# Patient Record
Sex: FEMALE | Race: WHITE | Employment: FULL TIME | URBAN - METROPOLITAN AREA
[De-identification: names, ages, dates, MRNs, and addresses within clinical notes are randomized per-mention and may not be internally consistent; named-entity substitution may affect disease eponyms.]

---

## 2017-02-20 RX ORDER — LEVOTHYROXINE SODIUM 150 MCG
TABLET ORAL
Qty: 90 TABLET | Refills: 0 | Status: SHIPPED | OUTPATIENT
Start: 2017-02-20 | End: 2017-05-19 | Stop reason: SDUPTHER

## 2017-03-31 RX ORDER — LABETALOL 300 MG/1
TABLET, FILM COATED ORAL
Qty: 180 TABLET | Refills: 0 | Status: SHIPPED | OUTPATIENT
Start: 2017-03-31 | End: 2017-07-05 | Stop reason: SDUPTHER

## 2017-04-07 ENCOUNTER — OFFICE VISIT (OUTPATIENT)
Dept: FAMILY MEDICINE CLINIC | Age: 42
End: 2017-04-07

## 2017-04-07 VITALS
HEART RATE: 86 BPM | DIASTOLIC BLOOD PRESSURE: 84 MMHG | BODY MASS INDEX: 41.82 KG/M2 | OXYGEN SATURATION: 98 % | SYSTOLIC BLOOD PRESSURE: 120 MMHG | HEIGHT: 65 IN | WEIGHT: 251 LBS

## 2017-04-07 DIAGNOSIS — R60.0 BILATERAL EDEMA OF LOWER EXTREMITY: ICD-10-CM

## 2017-04-07 DIAGNOSIS — E66.01 MORBID OBESITY WITH BMI OF 40.0-44.9, ADULT (HCC): ICD-10-CM

## 2017-04-07 DIAGNOSIS — M17.10 PATELLOFEMORAL ARTHROSIS: ICD-10-CM

## 2017-04-07 DIAGNOSIS — Z00.00 ROUTINE GENERAL MEDICAL EXAMINATION AT A HEALTH CARE FACILITY: Primary | ICD-10-CM

## 2017-04-07 DIAGNOSIS — G89.29 CHRONIC PAIN OF RIGHT KNEE: ICD-10-CM

## 2017-04-07 DIAGNOSIS — M17.10 LOCALIZED OSTEOARTHROSIS, LOWER LEG: ICD-10-CM

## 2017-04-07 DIAGNOSIS — Z23 NEED FOR TDAP VACCINATION: ICD-10-CM

## 2017-04-07 DIAGNOSIS — M25.561 CHRONIC PAIN OF RIGHT KNEE: ICD-10-CM

## 2017-04-07 DIAGNOSIS — I10 ESSENTIAL HYPERTENSION: ICD-10-CM

## 2017-04-07 LAB
A/G RATIO: 1.5 (ref 1.1–2.2)
ALBUMIN SERPL-MCNC: 3.8 G/DL (ref 3.4–5)
ALP BLD-CCNC: 81 U/L (ref 40–129)
ALT SERPL-CCNC: 12 U/L (ref 10–40)
ANION GAP SERPL CALCULATED.3IONS-SCNC: 19 MMOL/L (ref 3–16)
AST SERPL-CCNC: 17 U/L (ref 15–37)
BASOPHILS ABSOLUTE: 0.1 K/UL (ref 0–0.2)
BASOPHILS RELATIVE PERCENT: 0.8 %
BILIRUB SERPL-MCNC: 0.3 MG/DL (ref 0–1)
BUN BLDV-MCNC: 10 MG/DL (ref 7–20)
CALCIUM SERPL-MCNC: 9 MG/DL (ref 8.3–10.6)
CHLORIDE BLD-SCNC: 98 MMOL/L (ref 99–110)
CHOLESTEROL, TOTAL: 227 MG/DL (ref 0–199)
CO2: 24 MMOL/L (ref 21–32)
CREAT SERPL-MCNC: 0.6 MG/DL (ref 0.6–1.1)
EOSINOPHILS ABSOLUTE: 0.2 K/UL (ref 0–0.6)
EOSINOPHILS RELATIVE PERCENT: 3.6 %
GFR AFRICAN AMERICAN: >60
GFR NON-AFRICAN AMERICAN: >60
GLOBULIN: 2.5 G/DL
GLUCOSE BLD-MCNC: 97 MG/DL (ref 70–99)
HCT VFR BLD CALC: 39.1 % (ref 36–48)
HDLC SERPL-MCNC: 64 MG/DL (ref 40–60)
HEMOGLOBIN: 12.7 G/DL (ref 12–16)
LDL CHOLESTEROL CALCULATED: 114 MG/DL
LYMPHOCYTES ABSOLUTE: 1.5 K/UL (ref 1–5.1)
LYMPHOCYTES RELATIVE PERCENT: 22.5 %
MCH RBC QN AUTO: 26.1 PG (ref 26–34)
MCHC RBC AUTO-ENTMCNC: 32.6 G/DL (ref 31–36)
MCV RBC AUTO: 80 FL (ref 80–100)
MONOCYTES ABSOLUTE: 0.3 K/UL (ref 0–1.3)
MONOCYTES RELATIVE PERCENT: 5 %
NEUTROPHILS ABSOLUTE: 4.7 K/UL (ref 1.7–7.7)
NEUTROPHILS RELATIVE PERCENT: 68.1 %
PDW BLD-RTO: 14.7 % (ref 12.4–15.4)
PLATELET # BLD: 295 K/UL (ref 135–450)
PMV BLD AUTO: 7.8 FL (ref 5–10.5)
POTASSIUM SERPL-SCNC: 3.2 MMOL/L (ref 3.5–5.1)
RBC # BLD: 4.89 M/UL (ref 4–5.2)
SODIUM BLD-SCNC: 141 MMOL/L (ref 136–145)
T4 FREE: 1.4 NG/DL (ref 0.9–1.8)
TOTAL PROTEIN: 6.3 G/DL (ref 6.4–8.2)
TRIGL SERPL-MCNC: 243 MG/DL (ref 0–150)
TSH SERPL DL<=0.05 MIU/L-ACNC: 5.86 UIU/ML (ref 0.27–4.2)
VLDLC SERPL CALC-MCNC: 49 MG/DL
WBC # BLD: 6.9 K/UL (ref 4–11)

## 2017-04-07 PROCEDURE — 90471 IMMUNIZATION ADMIN: CPT | Performed by: FAMILY MEDICINE

## 2017-04-07 PROCEDURE — 99396 PREV VISIT EST AGE 40-64: CPT | Performed by: FAMILY MEDICINE

## 2017-04-07 PROCEDURE — 90715 TDAP VACCINE 7 YRS/> IM: CPT | Performed by: FAMILY MEDICINE

## 2017-04-07 RX ORDER — METHYLDOPA 250 MG/1
250 TABLET, FILM COATED ORAL 3 TIMES DAILY
Qty: 90 TABLET | Refills: 3 | Status: SHIPPED | OUTPATIENT
Start: 2017-04-07 | End: 2017-08-01 | Stop reason: SDUPTHER

## 2017-04-10 LAB — HIV-1 AND HIV-2 ANTIBODIES: NORMAL

## 2017-04-11 ENCOUNTER — TELEPHONE (OUTPATIENT)
Dept: OTHER | Facility: CLINIC | Age: 42
End: 2017-04-11

## 2017-05-25 RX ORDER — LEVOTHYROXINE SODIUM 0.15 MG/1
TABLET ORAL
Qty: 90 TABLET | Refills: 0 | Status: SHIPPED | OUTPATIENT
Start: 2017-05-25 | End: 2017-08-22 | Stop reason: SDUPTHER

## 2017-06-16 ENCOUNTER — TELEPHONE (OUTPATIENT)
Dept: FAMILY MEDICINE CLINIC | Age: 42
End: 2017-06-16

## 2017-06-16 DIAGNOSIS — E78.2 MIXED HYPERLIPIDEMIA: ICD-10-CM

## 2017-06-16 DIAGNOSIS — E03.9 HYPOTHYROIDISM, UNSPECIFIED TYPE: Primary | ICD-10-CM

## 2017-06-16 LAB
A/G RATIO: 1.7 (ref 1.1–2.2)
ALBUMIN SERPL-MCNC: 4.5 G/DL (ref 3.4–5)
ALP BLD-CCNC: 71 U/L (ref 40–129)
ALT SERPL-CCNC: 10 U/L (ref 10–40)
ANION GAP SERPL CALCULATED.3IONS-SCNC: 18 MMOL/L (ref 3–16)
AST SERPL-CCNC: 12 U/L (ref 15–37)
BILIRUB SERPL-MCNC: 0.3 MG/DL (ref 0–1)
BUN BLDV-MCNC: 12 MG/DL (ref 7–20)
CALCIUM SERPL-MCNC: 10.1 MG/DL (ref 8.3–10.6)
CHLORIDE BLD-SCNC: 97 MMOL/L (ref 99–110)
CHOLESTEROL, TOTAL: 244 MG/DL (ref 0–199)
CO2: 24 MMOL/L (ref 21–32)
CREAT SERPL-MCNC: 0.6 MG/DL (ref 0.6–1.1)
GFR AFRICAN AMERICAN: >60
GFR NON-AFRICAN AMERICAN: >60
GLOBULIN: 2.7 G/DL
GLUCOSE BLD-MCNC: 88 MG/DL (ref 70–99)
HDLC SERPL-MCNC: 89 MG/DL (ref 40–60)
LDL CHOLESTEROL CALCULATED: 118 MG/DL
POTASSIUM SERPL-SCNC: 3.6 MMOL/L (ref 3.5–5.1)
SODIUM BLD-SCNC: 139 MMOL/L (ref 136–145)
T4 FREE: 1.4 NG/DL (ref 0.9–1.8)
TOTAL PROTEIN: 7.2 G/DL (ref 6.4–8.2)
TRIGL SERPL-MCNC: 183 MG/DL (ref 0–150)
TSH SERPL DL<=0.05 MIU/L-ACNC: 2.6 UIU/ML (ref 0.27–4.2)
VLDLC SERPL CALC-MCNC: 37 MG/DL

## 2017-06-16 PROCEDURE — 36415 COLL VENOUS BLD VENIPUNCTURE: CPT | Performed by: FAMILY MEDICINE

## 2017-07-06 RX ORDER — LABETALOL 300 MG/1
300 TABLET, FILM COATED ORAL 2 TIMES DAILY
Qty: 180 TABLET | Refills: 0 | Status: SHIPPED | OUTPATIENT
Start: 2017-07-06 | End: 2017-10-05 | Stop reason: SDUPTHER

## 2017-08-01 RX ORDER — METHYLDOPA 250 MG/1
250 TABLET, FILM COATED ORAL 3 TIMES DAILY
Qty: 90 TABLET | Refills: 3 | Status: SHIPPED | OUTPATIENT
Start: 2017-08-01 | End: 2017-11-28 | Stop reason: SDUPTHER

## 2017-08-17 RX ORDER — HYDROCHLOROTHIAZIDE 25 MG/1
TABLET ORAL
Qty: 90 TABLET | Refills: 1 | Status: SHIPPED | OUTPATIENT
Start: 2017-08-17 | End: 2017-12-13 | Stop reason: SDUPTHER

## 2017-08-22 RX ORDER — LEVOTHYROXINE SODIUM 0.15 MG/1
TABLET ORAL
Qty: 90 TABLET | Refills: 0 | Status: SHIPPED | OUTPATIENT
Start: 2017-08-22 | End: 2017-08-25 | Stop reason: SDUPTHER

## 2017-08-25 RX ORDER — LEVOTHYROXINE SODIUM 0.15 MG/1
TABLET ORAL
Qty: 90 TABLET | Refills: 1 | Status: SHIPPED | OUTPATIENT
Start: 2017-08-25 | End: 2017-11-26 | Stop reason: SDUPTHER

## 2017-10-06 RX ORDER — LABETALOL 300 MG/1
300 TABLET, FILM COATED ORAL 2 TIMES DAILY
Qty: 180 TABLET | Refills: 0 | Status: SHIPPED | OUTPATIENT
Start: 2017-10-06 | End: 2018-01-03 | Stop reason: SDUPTHER

## 2017-11-02 ENCOUNTER — TELEPHONE (OUTPATIENT)
Dept: FAMILY MEDICINE CLINIC | Age: 42
End: 2017-11-02

## 2017-11-02 DIAGNOSIS — E78.2 MIXED HYPERLIPIDEMIA: ICD-10-CM

## 2017-11-02 DIAGNOSIS — E03.9 HYPOTHYROIDISM, UNSPECIFIED TYPE: Primary | ICD-10-CM

## 2017-11-10 DIAGNOSIS — E03.9 HYPOTHYROIDISM, UNSPECIFIED TYPE: ICD-10-CM

## 2017-11-10 DIAGNOSIS — E78.2 MIXED HYPERLIPIDEMIA: ICD-10-CM

## 2017-11-18 LAB
A/G RATIO: 1.5 (ref 1.1–2.2)
ALBUMIN SERPL-MCNC: 4 G/DL (ref 3.4–5)
ALP BLD-CCNC: 66 U/L (ref 40–129)
ALT SERPL-CCNC: <5 U/L (ref 10–40)
ANION GAP SERPL CALCULATED.3IONS-SCNC: 19 MMOL/L (ref 3–16)
AST SERPL-CCNC: 7 U/L (ref 15–37)
BILIRUB SERPL-MCNC: <0.2 MG/DL (ref 0–1)
BUN BLDV-MCNC: 13 MG/DL (ref 7–20)
CALCIUM SERPL-MCNC: 9.4 MG/DL (ref 8.3–10.6)
CHLORIDE BLD-SCNC: 96 MMOL/L (ref 99–110)
CHOLESTEROL, TOTAL: 237 MG/DL (ref 0–199)
CO2: 25 MMOL/L (ref 21–32)
CREAT SERPL-MCNC: 0.7 MG/DL (ref 0.6–1.1)
GFR AFRICAN AMERICAN: >60
GFR NON-AFRICAN AMERICAN: >60
GLOBULIN: 2.6 G/DL
GLUCOSE BLD-MCNC: 97 MG/DL (ref 70–99)
HDLC SERPL-MCNC: 75 MG/DL (ref 40–60)
LDL CHOLESTEROL CALCULATED: ABNORMAL MG/DL
LDL CHOLESTEROL DIRECT: 126 MG/DL
POTASSIUM SERPL-SCNC: 3.7 MMOL/L (ref 3.5–5.1)
SODIUM BLD-SCNC: 140 MMOL/L (ref 136–145)
T4 FREE: 1.2 NG/DL (ref 0.9–1.8)
TOTAL PROTEIN: 6.6 G/DL (ref 6.4–8.2)
TRIGL SERPL-MCNC: 338 MG/DL (ref 0–150)
TSH SERPL DL<=0.05 MIU/L-ACNC: 9.49 UIU/ML (ref 0.27–4.2)
VLDLC SERPL CALC-MCNC: ABNORMAL MG/DL

## 2017-11-26 RX ORDER — LEVOTHYROXINE SODIUM 175 UG/1
TABLET ORAL
Qty: 90 TABLET | Refills: 0 | Status: SHIPPED | OUTPATIENT
Start: 2017-11-26 | End: 2018-03-17 | Stop reason: SDUPTHER

## 2017-11-30 RX ORDER — METHYLDOPA 250 MG/1
250 TABLET, FILM COATED ORAL 3 TIMES DAILY
Qty: 90 TABLET | Refills: 1 | Status: SHIPPED | OUTPATIENT
Start: 2017-11-30 | End: 2018-01-27 | Stop reason: SDUPTHER

## 2017-12-14 RX ORDER — HYDROCHLOROTHIAZIDE 25 MG/1
TABLET ORAL
Qty: 90 TABLET | Refills: 0 | Status: SHIPPED | OUTPATIENT
Start: 2017-12-14 | End: 2018-03-17 | Stop reason: SDUPTHER

## 2018-01-03 NOTE — TELEPHONE ENCOUNTER
Last office visit Visit date not found     Last written 10-6-17   # 180  0 RF    Next office visit scheduled Visit date not found    Requested Prescriptions     Pending Prescriptions Disp Refills    labetalol (NORMODYNE) 300 MG tablet [Pharmacy Med Name: LABETALOL  MG TABLET] 60 tablet 0     Sig: TAKE ONE TABLET BY MOUTH TWICE A DAY

## 2018-01-04 RX ORDER — LABETALOL 300 MG/1
TABLET, FILM COATED ORAL
Qty: 60 TABLET | Refills: 2 | Status: SHIPPED | OUTPATIENT
Start: 2018-01-04 | End: 2018-04-02 | Stop reason: SDUPTHER

## 2018-01-29 RX ORDER — METHYLDOPA 250 MG/1
TABLET, FILM COATED ORAL
Qty: 90 TABLET | Refills: 2 | Status: SHIPPED | OUTPATIENT
Start: 2018-01-29 | End: 2018-04-23 | Stop reason: SDUPTHER

## 2018-04-17 RX ORDER — LEVOTHYROXINE SODIUM 175 MCG
TABLET ORAL
Qty: 30 TABLET | Refills: 0 | Status: SHIPPED | OUTPATIENT
Start: 2018-04-17 | End: 2018-04-23 | Stop reason: SDUPTHER

## 2018-04-17 RX ORDER — HYDROCHLOROTHIAZIDE 25 MG/1
TABLET ORAL
Qty: 30 TABLET | Refills: 0 | Status: SHIPPED | OUTPATIENT
Start: 2018-04-17 | End: 2018-04-23 | Stop reason: SDUPTHER

## 2018-04-23 ENCOUNTER — OFFICE VISIT (OUTPATIENT)
Dept: FAMILY MEDICINE CLINIC | Age: 43
End: 2018-04-23

## 2018-04-23 VITALS
OXYGEN SATURATION: 98 % | HEART RATE: 70 BPM | BODY MASS INDEX: 44.32 KG/M2 | SYSTOLIC BLOOD PRESSURE: 120 MMHG | HEIGHT: 65 IN | DIASTOLIC BLOOD PRESSURE: 80 MMHG | WEIGHT: 266 LBS

## 2018-04-23 DIAGNOSIS — H40.053 DISEASE OF BOTH EYES CHARACTERIZED BY INCREASED EYE PRESSURE: ICD-10-CM

## 2018-04-23 DIAGNOSIS — E66.01 MORBID OBESITY WITH BMI OF 40.0-44.9, ADULT (HCC): ICD-10-CM

## 2018-04-23 DIAGNOSIS — Z00.00 ROUTINE GENERAL MEDICAL EXAMINATION AT A HEALTH CARE FACILITY: Primary | ICD-10-CM

## 2018-04-23 DIAGNOSIS — M17.11 ARTHRITIS OF RIGHT KNEE: ICD-10-CM

## 2018-04-23 LAB
A/G RATIO: 1.9 (ref 1.1–2.2)
ALBUMIN SERPL-MCNC: 4.2 G/DL (ref 3.4–5)
ALP BLD-CCNC: 74 U/L (ref 40–129)
ALT SERPL-CCNC: 11 U/L (ref 10–40)
ANION GAP SERPL CALCULATED.3IONS-SCNC: 14 MMOL/L (ref 3–16)
AST SERPL-CCNC: 11 U/L (ref 15–37)
BILIRUB SERPL-MCNC: <0.2 MG/DL (ref 0–1)
BUN BLDV-MCNC: 10 MG/DL (ref 7–20)
CALCIUM SERPL-MCNC: 9.5 MG/DL (ref 8.3–10.6)
CHLORIDE BLD-SCNC: 100 MMOL/L (ref 99–110)
CHOLESTEROL, TOTAL: 197 MG/DL (ref 0–199)
CO2: 26 MMOL/L (ref 21–32)
CREAT SERPL-MCNC: 0.6 MG/DL (ref 0.6–1.1)
GFR AFRICAN AMERICAN: >60
GFR NON-AFRICAN AMERICAN: >60
GLOBULIN: 2.2 G/DL
GLUCOSE BLD-MCNC: 102 MG/DL (ref 70–99)
HDLC SERPL-MCNC: 65 MG/DL (ref 40–60)
LDL CHOLESTEROL CALCULATED: 96 MG/DL
POTASSIUM SERPL-SCNC: 3.4 MMOL/L (ref 3.5–5.1)
SODIUM BLD-SCNC: 140 MMOL/L (ref 136–145)
T4 FREE: 1.4 NG/DL (ref 0.9–1.8)
TOTAL PROTEIN: 6.4 G/DL (ref 6.4–8.2)
TRIGL SERPL-MCNC: 179 MG/DL (ref 0–150)
TSH SERPL DL<=0.05 MIU/L-ACNC: 0.6 UIU/ML (ref 0.27–4.2)
VLDLC SERPL CALC-MCNC: 36 MG/DL

## 2018-04-23 PROCEDURE — 99396 PREV VISIT EST AGE 40-64: CPT | Performed by: FAMILY MEDICINE

## 2018-04-23 PROCEDURE — 36415 COLL VENOUS BLD VENIPUNCTURE: CPT | Performed by: FAMILY MEDICINE

## 2018-04-23 RX ORDER — HYDROCHLOROTHIAZIDE 25 MG/1
TABLET ORAL
Qty: 30 TABLET | Refills: 5 | Status: SHIPPED | OUTPATIENT
Start: 2018-04-23 | End: 2018-11-12 | Stop reason: SDUPTHER

## 2018-04-23 RX ORDER — LEVOTHYROXINE SODIUM 175 UG/1
TABLET ORAL
Qty: 30 TABLET | Refills: 5 | Status: SHIPPED | OUTPATIENT
Start: 2018-04-23 | End: 2018-11-12 | Stop reason: SDUPTHER

## 2018-04-23 RX ORDER — METHYLDOPA 250 MG/1
TABLET, FILM COATED ORAL
Qty: 90 TABLET | Refills: 5 | Status: SHIPPED | OUTPATIENT
Start: 2018-04-23 | End: 2019-01-28 | Stop reason: SDUPTHER

## 2018-04-23 RX ORDER — MELOXICAM 15 MG/1
15 TABLET ORAL DAILY
Qty: 30 TABLET | Refills: 5 | Status: SHIPPED | OUTPATIENT
Start: 2018-04-23 | End: 2019-01-28 | Stop reason: ALTCHOICE

## 2018-04-23 RX ORDER — LABETALOL 300 MG/1
TABLET, FILM COATED ORAL
Qty: 60 TABLET | Refills: 5 | Status: SHIPPED | OUTPATIENT
Start: 2018-04-23 | End: 2018-11-04 | Stop reason: SDUPTHER

## 2018-04-23 ASSESSMENT — PATIENT HEALTH QUESTIONNAIRE - PHQ9
1. LITTLE INTEREST OR PLEASURE IN DOING THINGS: 0
2. FEELING DOWN, DEPRESSED OR HOPELESS: 0
SUM OF ALL RESPONSES TO PHQ9 QUESTIONS 1 & 2: 0
SUM OF ALL RESPONSES TO PHQ QUESTIONS 1-9: 0

## 2018-04-24 DIAGNOSIS — E78.1 HYPERTRIGLYCERIDEMIA: Primary | ICD-10-CM

## 2018-04-24 DIAGNOSIS — R73.09 ELEVATED GLUCOSE: ICD-10-CM

## 2018-11-05 RX ORDER — LABETALOL 300 MG/1
TABLET, FILM COATED ORAL
Qty: 60 TABLET | Refills: 4 | Status: SHIPPED | OUTPATIENT
Start: 2018-11-05 | End: 2019-04-02 | Stop reason: SDUPTHER

## 2018-11-12 RX ORDER — LEVOTHYROXINE SODIUM 175 MCG
TABLET ORAL
Qty: 30 TABLET | Refills: 5 | Status: SHIPPED | OUTPATIENT
Start: 2018-11-12 | End: 2019-04-18 | Stop reason: SDUPTHER

## 2018-11-12 RX ORDER — HYDROCHLOROTHIAZIDE 25 MG/1
TABLET ORAL
Qty: 30 TABLET | Refills: 5 | Status: SHIPPED | OUTPATIENT
Start: 2018-11-12 | End: 2019-04-18 | Stop reason: SDUPTHER

## 2019-01-28 ENCOUNTER — OFFICE VISIT (OUTPATIENT)
Dept: FAMILY MEDICINE CLINIC | Age: 44
End: 2019-01-28
Payer: COMMERCIAL

## 2019-01-28 VITALS
WEIGHT: 256 LBS | SYSTOLIC BLOOD PRESSURE: 130 MMHG | HEART RATE: 88 BPM | HEIGHT: 65 IN | BODY MASS INDEX: 42.65 KG/M2 | DIASTOLIC BLOOD PRESSURE: 80 MMHG | OXYGEN SATURATION: 98 %

## 2019-01-28 DIAGNOSIS — J20.9 ACUTE BRONCHITIS WITH BRONCHOSPASM: Primary | ICD-10-CM

## 2019-01-28 PROCEDURE — 99214 OFFICE O/P EST MOD 30 MIN: CPT | Performed by: FAMILY MEDICINE

## 2019-01-28 RX ORDER — METHYLPREDNISOLONE 4 MG/1
TABLET ORAL
Qty: 1 KIT | Refills: 0 | Status: SHIPPED | OUTPATIENT
Start: 2019-01-28 | End: 2019-04-18 | Stop reason: ALTCHOICE

## 2019-01-28 RX ORDER — AZITHROMYCIN 250 MG/1
TABLET, FILM COATED ORAL
Qty: 1 PACKET | Refills: 0 | Status: SHIPPED | OUTPATIENT
Start: 2019-01-28 | End: 2019-04-18 | Stop reason: ALTCHOICE

## 2019-01-28 RX ORDER — METHYLDOPA 500 MG/1
TABLET, FILM COATED ORAL
Qty: 45 TABLET | Refills: 2 | OUTPATIENT
Start: 2019-01-28

## 2019-01-28 RX ORDER — METHYLDOPA 250 MG/1
TABLET, FILM COATED ORAL
Qty: 90 TABLET | Refills: 2 | Status: CANCELLED | OUTPATIENT
Start: 2019-01-28

## 2019-01-28 RX ORDER — METHYLDOPA 250 MG/1
TABLET, FILM COATED ORAL
Qty: 90 TABLET | Refills: 2 | Status: SHIPPED | OUTPATIENT
Start: 2019-01-28 | End: 2019-04-18 | Stop reason: SDUPTHER

## 2019-01-29 ASSESSMENT — ENCOUNTER SYMPTOMS
RHINORRHEA: 0
SINUS PRESSURE: 0
SORE THROAT: 0
COUGH: 1
SINUS PAIN: 0
SHORTNESS OF BREATH: 0
WHEEZING: 1
VOMITING: 1

## 2019-04-05 RX ORDER — LABETALOL 300 MG/1
TABLET, FILM COATED ORAL
Qty: 60 TABLET | Refills: 3 | Status: SHIPPED | OUTPATIENT
Start: 2019-04-05 | End: 2019-08-01 | Stop reason: SDUPTHER

## 2019-04-18 ENCOUNTER — OFFICE VISIT (OUTPATIENT)
Dept: FAMILY MEDICINE CLINIC | Age: 44
End: 2019-04-18
Payer: COMMERCIAL

## 2019-04-18 VITALS
OXYGEN SATURATION: 98 % | WEIGHT: 241 LBS | HEART RATE: 74 BPM | DIASTOLIC BLOOD PRESSURE: 78 MMHG | HEIGHT: 65 IN | BODY MASS INDEX: 40.15 KG/M2 | SYSTOLIC BLOOD PRESSURE: 122 MMHG

## 2019-04-18 DIAGNOSIS — E03.9 HYPOTHYROIDISM, UNSPECIFIED TYPE: ICD-10-CM

## 2019-04-18 DIAGNOSIS — R73.03 PREDIABETES: ICD-10-CM

## 2019-04-18 DIAGNOSIS — E66.01 MORBID OBESITY WITH BMI OF 40.0-44.9, ADULT (HCC): ICD-10-CM

## 2019-04-18 DIAGNOSIS — I10 ESSENTIAL HYPERTENSION: Primary | ICD-10-CM

## 2019-04-18 DIAGNOSIS — Z00.00 ROUTINE GENERAL MEDICAL EXAMINATION AT A HEALTH CARE FACILITY: ICD-10-CM

## 2019-04-18 DIAGNOSIS — R73.09 ELEVATED GLUCOSE: ICD-10-CM

## 2019-04-18 LAB
A/G RATIO: 1.6 (ref 1.1–2.2)
ALBUMIN SERPL-MCNC: 4.4 G/DL (ref 3.4–5)
ALP BLD-CCNC: 82 U/L (ref 40–129)
ALT SERPL-CCNC: 12 U/L (ref 10–40)
ANION GAP SERPL CALCULATED.3IONS-SCNC: 16 MMOL/L (ref 3–16)
AST SERPL-CCNC: 21 U/L (ref 15–37)
BILIRUB SERPL-MCNC: 0.4 MG/DL (ref 0–1)
BUN BLDV-MCNC: 7 MG/DL (ref 7–20)
CALCIUM SERPL-MCNC: 9.6 MG/DL (ref 8.3–10.6)
CHLORIDE BLD-SCNC: 92 MMOL/L (ref 99–110)
CHOLESTEROL, TOTAL: 233 MG/DL (ref 0–199)
CO2: 29 MMOL/L (ref 21–32)
CREAT SERPL-MCNC: 0.7 MG/DL (ref 0.6–1.1)
GFR AFRICAN AMERICAN: >60
GFR NON-AFRICAN AMERICAN: >60
GLOBULIN: 2.7 G/DL
GLUCOSE BLD-MCNC: 105 MG/DL (ref 70–99)
HBA1C MFR BLD: 5.7 %
HDLC SERPL-MCNC: 65 MG/DL (ref 40–60)
LDL CHOLESTEROL CALCULATED: 109 MG/DL
POTASSIUM SERPL-SCNC: 2.8 MMOL/L (ref 3.5–5.1)
SODIUM BLD-SCNC: 137 MMOL/L (ref 136–145)
T4 FREE: 1.8 NG/DL (ref 0.9–1.8)
TOTAL PROTEIN: 7.1 G/DL (ref 6.4–8.2)
TRIGL SERPL-MCNC: 295 MG/DL (ref 0–150)
TSH SERPL DL<=0.05 MIU/L-ACNC: 0.9 UIU/ML (ref 0.27–4.2)
VLDLC SERPL CALC-MCNC: 59 MG/DL

## 2019-04-18 PROCEDURE — 83036 HEMOGLOBIN GLYCOSYLATED A1C: CPT | Performed by: FAMILY MEDICINE

## 2019-04-18 PROCEDURE — 99214 OFFICE O/P EST MOD 30 MIN: CPT | Performed by: FAMILY MEDICINE

## 2019-04-18 RX ORDER — METHYLDOPA 250 MG/1
TABLET, FILM COATED ORAL
Qty: 90 TABLET | Refills: 5 | Status: SHIPPED | OUTPATIENT
Start: 2019-04-18 | End: 2019-10-23 | Stop reason: SDUPTHER

## 2019-04-18 RX ORDER — LEVOTHYROXINE SODIUM 175 UG/1
TABLET ORAL
Qty: 30 TABLET | Refills: 5 | Status: SHIPPED | OUTPATIENT
Start: 2019-04-18 | End: 2019-11-07 | Stop reason: SDUPTHER

## 2019-04-18 RX ORDER — HYDROCHLOROTHIAZIDE 25 MG/1
TABLET ORAL
Qty: 30 TABLET | Refills: 5 | Status: SHIPPED | OUTPATIENT
Start: 2019-04-18 | End: 2019-04-25

## 2019-04-18 ASSESSMENT — PATIENT HEALTH QUESTIONNAIRE - PHQ9
SUM OF ALL RESPONSES TO PHQ QUESTIONS 1-9: 0
SUM OF ALL RESPONSES TO PHQ9 QUESTIONS 1 & 2: 0
2. FEELING DOWN, DEPRESSED OR HOPELESS: 0
1. LITTLE INTEREST OR PLEASURE IN DOING THINGS: 0
SUM OF ALL RESPONSES TO PHQ QUESTIONS 1-9: 0

## 2019-04-18 ASSESSMENT — ENCOUNTER SYMPTOMS
ABDOMINAL PAIN: 0
SHORTNESS OF BREATH: 0

## 2019-04-18 NOTE — PROGRESS NOTES
Vlad Che (:  1975) is a 37 y.o. female, here for evaluation of the following medical concerns:    Chief complaint: Patient presents with:  Hypertension     Past medical, surgical, family and social history, as well as current medications and allergies, were reviewed and updated with the patient. Hypertension:  Home blood pressure monitoring: No.  She is adherent to a low sodium diet. Patient denies chest pain, shortness of breath, headache, lightheadedness and peripheral edema. Antihypertensive medication side effects: no medication side effects noted. Use of agents associated with hypertension: thyroid hormones. Sodium (mmol/L)   Date Value   2018 140    BUN (mg/dL)   Date Value   2018 10    Glucose (mg/dL)   Date Value   2018 102 (H)      Potassium (mmol/L)   Date Value   2018 3.4 (L)    CREATININE (mg/dL)   Date Value   2018 0.6         Hypothyroidism: Recent symptoms: none. She denies weight gain, weight loss, cold intolerance and heat intolerance. She has been losing weight, but doing so intentionally. She is doing [employer] currently. Patient is  taking her medication consistently on an empty stomach. Lab Results   Component Value Date    TSHREFLEX 5.30 2013    TSHREFLEX 8.51 2013    TSHREFLEX 7.31 2013     Lab Results   Component Value Date    TSH 0.60 2018    TSH 9.49 (H) 2017    TSH 2.60 2017       Review of Systems   Constitutional: Negative for unexpected weight change. Eyes: Negative for visual disturbance. Respiratory: Negative for shortness of breath. Cardiovascular: Negative for chest pain and leg swelling. Gastrointestinal: Negative for abdominal pain. Endocrine: Negative for cold intolerance and heat intolerance. Prior to Visit Medications    Medication Sig Taking?  Authorizing Provider   levothyroxine (SYNTHROID) 175 MCG tablet TAKE ONE TABLET BY MOUTH DAILY Yes Ruben Bush MD   methyldopa (ALDOMET) 250 MG tablet TAKE ONE TABLET BY MOUTH THREE TIMES A DAY Yes Ruben Bush MD   hydrochlorothiazide (HYDRODIURIL) 25 MG tablet TAKE ONE TABLET BY MOUTH DAILY Yes Ruben Bush MD   labetalol (NORMODYNE) 300 MG tablet TAKE ONE TABLET BY MOUTH TWICE A DAY Yes Ruben Bush MD   Omega-3 Fatty Acids (OMEGA-3 FISH OIL) 1000 MG CAPS Take by mouth Yes Historical Provider, MD   ibuprofen (ADVIL;MOTRIN) 200 MG tablet Take 200 mg by mouth every 6 hours as needed. Yes Historical Provider, MD   Blood Pressure Monitor FLIP Use as directed. Yes Ruben Bush MD        Social History     Tobacco Use    Smoking status: Never Smoker    Smokeless tobacco: Never Used   Substance Use Topics    Alcohol use: Yes     Alcohol/week: 12.6 oz     Types: 15 Cans of beer, 6 Shots of liquor per week     Comment: 3 days        Vitals:    04/18/19 0832   BP: 122/78   Site: Right Upper Arm   Position: Sitting   Cuff Size: Large Adult   Pulse: 74   SpO2: 98%   Weight: 241 lb (109.3 kg)   Height: 5' 5\" (1.651 m)     Estimated body mass index is 40.1 kg/m² as calculated from the following:    Height as of this encounter: 5' 5\" (1.651 m). Weight as of this encounter: 241 lb (109.3 kg). Physical Exam   Constitutional: She is oriented to person, place, and time. Vital signs are normal. She appears well-developed and well-nourished. She is active and cooperative. HENT:   Head: Normocephalic and atraumatic. Right Ear: External ear normal.   Left Ear: External ear normal.   Nose: Nose normal.   Eyes: Conjunctivae are normal. No scleral icterus. Pulmonary/Chest: Effort normal.   Neurological: She is alert and oriented to person, place, and time. No cranial nerve deficit. Coordination and gait normal.   Psychiatric: She has a normal mood and affect.  Her speech is normal and behavior is normal. Judgment and thought content normal.   Nursing note and vitals reviewed. Results for POC orders placed in visit on 04/18/19   POCT glycosylated hemoglobin (Hb A1C)   Result Value Ref Range    Hemoglobin A1C 5.7 %      ASSESSMENT/PLAN:  1. Essential hypertension  This problem is well controlled. Pt should continue current medication at current dose. 2. Elevated glucose  Indicates prediabetes. See below for advice. - POCT glycosylated hemoglobin (Hb A1C)    3. Prediabetes  Advice given to the patient today:  The hemoglobin A1C test and the glucose level indicate you have developed prediabetes. Pre-diabetes is a condition that forms before diabetes. It means that blood sugar levels are higher than normal but aren't high enough to be diagnosed as diabetes. Usually a fasting blood sugar level of 100-125 mg/dl, or a hemoglobin A1C of 5.7-.6.4%, indicates pre-diabetes. Pre-diabetes is a warning sign that allows people to take action to prevent or delay the onset of Type 2 diabetes. I recommend adopting a regular exercise routine, with the goal of 30 minutes of exercise 5 days per week. I also recommend cutting down on sugary foods and drinks (sodas, juices, milk), and cutting down on foods made from refined white flour, white rice, corn or white potatoes. Those starchy/high carb foods get turned directly to sugar inside your body as soon as they are put into your mouth. It is better to eat more fresh non-starchy vegetables (things high in fiber) and proteins (meats, fish, poultry, eggs, nuts, cheese). Fruits and 100% whole grains are ok in smaller amounts. I also recommend eating 2-3 meals per day without snacking in between. Try not to eat before bed. Each meal and snack should contain higher levels of fat, which will not affect your blood sugar. 4. Hypothyroidism, unspecified type  This problem is stable. Continue current meds until lab results obtain, will adjust dosing if necessary at that time.      5. Morbid obesity with BMI of 40.0-44.9, adult ([address]  The patient is asked to make an attempt to improve diet and exercise patterns to aid in medical management of this problem. Discussed cutting out refined carbohydrates, developing healthier relationship with food, decreasing scarcity mentality and using foods for reasons other than hunger. Discuss hunger scale and how to gauge when she would need to eat and when she wouldn't. Discussed she would be a good candidate to try IF. Reviewed concept and offered resources where she can learn more. Food and hunger scale journaling encouraged. Return in about 6 months (around 10/18/2019). An electronic signature was used to authenticate this note.     --Shakira Clark MD on 4/18/2019 at 1:50 PM

## 2019-04-18 NOTE — PATIENT INSTRUCTIONS
\"The Obesity Code\" - Dr. Ruth Leo (Book)    \"Why We Get Fat\" - Martir Harper (Book)    Www.dietdoctor. com    [employer] Magic Pill\" - Netflix     Podcasts - Wali Candelario or Sridhar Jalloh

## 2019-04-19 ENCOUNTER — TELEPHONE (OUTPATIENT)
Dept: FAMILY MEDICINE CLINIC | Age: 44
End: 2019-04-19

## 2019-04-19 DIAGNOSIS — E87.6 HYPOKALEMIA: ICD-10-CM

## 2019-04-19 DIAGNOSIS — E87.6 HYPOKALEMIA: Primary | ICD-10-CM

## 2019-04-19 DIAGNOSIS — E83.42 HYPOMAGNESEMIA: ICD-10-CM

## 2019-04-19 LAB
MAGNESIUM: 1.5 MG/DL (ref 1.8–2.4)
POTASSIUM SERPL-SCNC: 3 MMOL/L (ref 3.5–5.1)

## 2019-04-19 RX ORDER — POTASSIUM CHLORIDE 20 MEQ/1
60 TABLET, EXTENDED RELEASE ORAL ONCE
Qty: 3 TABLET | Refills: 0 | Status: SHIPPED | OUTPATIENT
Start: 2019-04-19 | End: 2020-06-15 | Stop reason: ALTCHOICE

## 2019-04-19 NOTE — TELEPHONE ENCOUNTER
Lab called with low potassium. Tried to reach pt, no answer, will call again. Dr. Roberto Cushing is taking over on call now. Routing to . Left VM to her  to ask Gregorio King to call our office. Called pt a few times and emailed to call us too.

## 2019-04-19 NOTE — TELEPHONE ENCOUNTER
Received page on after hours line: \"had a message to call office re lab results, please call back. \"    Chart reviewed: Jluis De La Fuente NP, had left multiple messages regarding this and I had spoken to her as well this morning. K of 2.8. Last K was 3.4 last year. On HCTZ daily. Will reorder at this time along with Mg STAT. Will follow labs. Pt heading to a [employer]. Understands significance.

## 2019-04-20 NOTE — TELEPHONE ENCOUNTER
Called pt to discuss lab work  K up from 2.8 to 3 on repeat   Mag mildly low. No acute concerning Sxs: No CP, SOB, palpitations, nausea, diaphoresis, HA, dizziness, visual changes, etc.     Will supplement with 60 mEq Kdur and short course Magnesium supplementation. I encouraged pt to pick this up tonight. Do not feel ED visit for IV K is warranted at this time. Repeat lab work ordered for Monday morning. Pt is aware and agreeable. Medication list reviewed. Instructed pt to stop HCTZ. Encouraged her to make an appmnt Monday to discuss different blood pressure medication options. All questions answered. Will CC PCP.

## 2019-04-22 ENCOUNTER — TELEPHONE (OUTPATIENT)
Dept: FAMILY MEDICINE CLINIC | Age: 44
End: 2019-04-22

## 2019-04-22 DIAGNOSIS — E87.6 HYPOKALEMIA: ICD-10-CM

## 2019-04-22 DIAGNOSIS — E83.42 HYPOMAGNESEMIA: ICD-10-CM

## 2019-04-22 LAB
MAGNESIUM: 1.5 MG/DL (ref 1.8–2.4)
POTASSIUM SERPL-SCNC: 3.4 MMOL/L (ref 3.5–5.1)

## 2019-04-22 RX ORDER — MAGNESIUM OXIDE 400 MG/1
400 TABLET ORAL DAILY
Qty: 30 TABLET | Refills: 0 | Status: SHIPPED | OUTPATIENT
Start: 2019-04-22 | End: 2020-06-15

## 2019-04-22 NOTE — TELEPHONE ENCOUNTER
[id number] Zehra Mitchell, thanks. I did not see those lab results until now. I sent in Magnesium Oxide to take in addition to potassium. I would like to see her ASAP, so please use any of my same day appointments for this or if someone cancels, please call her.

## 2019-04-22 NOTE — TELEPHONE ENCOUNTER
I did not prescribe. Don't take either of them. Needs more labs to determine if this is necessary or not. Please have patient scheduled to see me this week, as soon as possible.

## 2019-04-23 RX ORDER — POTASSIUM CHLORIDE 20 MEQ/1
60 TABLET, EXTENDED RELEASE ORAL ONCE
Qty: 3 TABLET | Refills: 0 | Status: CANCELLED | OUTPATIENT
Start: 2019-04-23 | End: 2019-04-23

## 2019-04-23 NOTE — TELEPHONE ENCOUNTER
Pt aware. States she already took all of the potassium pills as she only got 3. Asking if she need a new Rx or an OTC potassium.  Pharmacy and rx pended if appropriate

## 2019-04-25 ENCOUNTER — OFFICE VISIT (OUTPATIENT)
Dept: FAMILY MEDICINE CLINIC | Age: 44
End: 2019-04-25
Payer: COMMERCIAL

## 2019-04-25 VITALS
BODY MASS INDEX: 40.48 KG/M2 | DIASTOLIC BLOOD PRESSURE: 82 MMHG | OXYGEN SATURATION: 98 % | TEMPERATURE: 98.6 F | HEART RATE: 75 BPM | HEIGHT: 65 IN | SYSTOLIC BLOOD PRESSURE: 126 MMHG | WEIGHT: 243 LBS

## 2019-04-25 DIAGNOSIS — E87.6 HYPOKALEMIA: Primary | ICD-10-CM

## 2019-04-25 DIAGNOSIS — I10 ESSENTIAL HYPERTENSION: ICD-10-CM

## 2019-04-25 DIAGNOSIS — E83.42 HYPOMAGNESEMIA: ICD-10-CM

## 2019-04-25 LAB
ANION GAP SERPL CALCULATED.3IONS-SCNC: 17 MMOL/L (ref 3–16)
BUN BLDV-MCNC: 7 MG/DL (ref 7–20)
CALCIUM SERPL-MCNC: 9.3 MG/DL (ref 8.3–10.6)
CHLORIDE BLD-SCNC: 100 MMOL/L (ref 99–110)
CO2: 24 MMOL/L (ref 21–32)
CREAT SERPL-MCNC: 0.5 MG/DL (ref 0.6–1.1)
GFR AFRICAN AMERICAN: >60
GFR NON-AFRICAN AMERICAN: >60
GLUCOSE BLD-MCNC: 97 MG/DL (ref 70–99)
MAGNESIUM: 1.9 MG/DL (ref 1.8–2.4)
POTASSIUM SERPL-SCNC: 3.4 MMOL/L (ref 3.5–5.1)
SODIUM BLD-SCNC: 141 MMOL/L (ref 136–145)

## 2019-04-25 PROCEDURE — 99213 OFFICE O/P EST LOW 20 MIN: CPT | Performed by: FAMILY MEDICINE

## 2019-04-25 NOTE — PATIENT INSTRUCTIONS
Patient Education        Hypokalemia: Care Instructions  Your Care Instructions    Hypokalemia (say \"nf-[id number]-qfy-RIKKI-latia-uh\") is a low level of potassium. The heart, muscles, kidneys, and nervous system all need potassium to work well. This problem has many different causes. Kidney problems, diet, and medicines like diuretics and laxatives can cause it. So can vomiting or diarrhea. In some cases, cancer is the cause. Your doctor may do tests to find the cause of your low potassium levels. You may need medicines to bring your potassium levels back to normal. You may also need regular blood tests to check your potassium. If you have very low potassium, you may need intravenous (IV) medicines. You also may need tests to check the electrical activity of your heart. Heart problems caused by low potassium levels can be very serious. Follow-up care is a key part of your treatment and safety. Be sure to make and go to all appointments, and call your doctor if you are having problems. It's also a good idea to know your test results and keep a list of the medicines you take. How can you care for yourself at home? · If your doctor recommends it, eat foods that have a lot of potassium. These include fresh fruits, juices, and vegetables. They also include nuts, beans, and milk. · Be safe with medicines. If your doctor prescribes medicines or potassium supplements, take them exactly as directed. Call your doctor if you have any problems with your medicines. · Get your potassium levels tested as often as your doctor tells you. When should you call for help? Call 911 anytime you think you may need emergency care. For example, call if:    · You feel like your heart is missing beats.  Heart problems caused by low potassium can cause death.     · You passed out (lost consciousness).     · You have a seizure.    Call your doctor now or seek immediate medical care if:    · You feel weak or unusually tired.     · You have severe arm or leg cramps.     · You have tingling or numbness.     · You feel sick to your stomach, or you vomit.     · You have belly cramps.     · You feel bloated or constipated.     · You have to urinate a lot.     · You feel very thirsty most of the time.     · You are dizzy or lightheaded, or you feel like you may faint.     · You feel depressed, or you lose touch with reality.    Watch closely for changes in your health, and be sure to contact your doctor if:    · You do not get better as expected. Where can you learn more? Go to https://[email or web address].compepiceweb.[email or web address] org and sign in to your [employer] account. Enter [id number] in the PingThings box to learn more about \"Hypokalemia: Care Instructions. \"     If you do not have an account, please click on the \"Sign Up Now\" link. Current as of: March 14, 2018  Content Version: 11.9  © 9151-3774 [employer]. Care instructions adapted under license by [facility] [place] ([facility]). If you have questions about a medical condition or this instruction, always ask your healthcare professional. [place] [address] any warranty or liability for your use of this information.

## 2019-04-28 LAB
ALDOSTERONE: 8.1 NG/DL
RENIN ACTIVITY: 3.7 NG/ML/HR

## 2019-04-28 NOTE — PROGRESS NOTES
Thomas Marti (:  1975) is a 37 y.o. female, here for evaluation of the following medical concerns:    Chief complaint: Patient presents with:  Discuss Labs  Discuss Medications: potassium and magnesium      Past medical, surgical, family and social history, as well as current medications and allergies, were reviewed and updated with the patient. Patient recently found to have critically low potassium and low magnesium after getting labs done for routine follow on HTN. Was taking HCTZ at the time, was told to stop after lab results were obtained. Started on potassium and magnesium. Here for follow up on electrolytes and BP. Review of Systems   Cardiovascular: Negative for chest pain and palpitations. Musculoskeletal: Negative for myalgias. Prior to Visit Medications    Medication Sig Taking? Authorizing Provider   magnesium oxide (MAG-OX) 400 MG tablet Take 1 tablet by mouth daily Yes Lito Conteh MD   levothyroxine (SYNTHROID) 175 MCG tablet TAKE ONE TABLET BY MOUTH DAILY Yes Lito Conteh MD   methyldopa (ALDOMET) 250 MG tablet TAKE ONE TABLET BY MOUTH THREE TIMES A DAY Yes Lito Conteh MD   labetalol (NORMODYNE) 300 MG tablet TAKE ONE TABLET BY MOUTH TWICE A DAY Yes Lito Conteh MD   Blood Pressure Monitor FLIP Use as directed. Yes Lito Conteh MD   potassium chloride (KLOR-CON M) 20 MEQ extended release tablet Take 3 tablets by mouth once for 1 dose  Radha Mejia MD        Social History     Tobacco Use    Smoking status: Never Smoker    Smokeless tobacco: Never Used   Substance Use Topics    Alcohol use:  Yes     Alcohol/week: 12.6 oz     Types: 15 Cans of beer, 6 Shots of liquor per week     Comment: 3 days        Vitals:    19 0913   BP: 126/82   Site: Right Upper Arm   Position: Sitting   Cuff Size: Large Adult   Pulse: 75   Temp: 98.6 °F (37 °C)   TempSrc: Oral   SpO2: 98%   Weight: 243 lb (110.2 kg)   Height: 5' 5\" (1.651 m) Estimated body mass index is 40.44 kg/m² as calculated from the following:    Height as of this encounter: 5' 5\" (1.651 m). Weight as of this encounter: 243 lb (110.2 kg). Physical Exam   Constitutional: She appears well-developed and well-nourished. No distress. HENT:   Head: Normocephalic and atraumatic. Eyes: Conjunctivae are normal. No scleral icterus. Cardiovascular: Normal rate, regular rhythm and normal heart sounds. Exam reveals no gallop and no friction rub. No murmur heard. Pulmonary/Chest: Effort normal and breath sounds normal. No respiratory distress. She has no wheezes. She has no rales. Abdominal: Soft. Bowel sounds are normal. She exhibits no distension and no mass. There is no tenderness. There is no rebound and no guarding. Musculoskeletal: She exhibits no edema. Neurological: She is alert. Skin: She is not diaphoretic. Nursing note and vitals reviewed.        Lab Review   Orders Only on 04/22/2019   Component Date Value    Magnesium 04/22/2019 1.50*    Potassium 04/22/2019 3.4*   Orders Only on 04/19/2019   Component Date Value    Magnesium 04/19/2019 1.50*    Potassium 04/19/2019 3.0*   Office Visit on 04/18/2019   Component Date Value    Hemoglobin A1C 04/18/2019 5.7     TSH 04/18/2019 0.90     T4 Free 04/18/2019 1.8     Sodium 04/18/2019 137     Potassium 04/18/2019 2.8*    Chloride 04/18/2019 92*    CO2 04/18/2019 29     Anion Gap 04/18/2019 16     Glucose 04/18/2019 105*    BUN 04/18/2019 7     CREATININE 04/18/2019 0.7     GFR Non- 04/18/2019 >60     GFR  04/18/2019 >60     Calcium 04/18/2019 9.6     Total Protein 04/18/2019 7.1     Alb 04/18/2019 4.4     Albumin/Globulin Ratio 04/18/2019 1.6     Total Bilirubin 04/18/2019 0.4     Alkaline Phosphatase 04/18/2019 82     ALT 04/18/2019 12     AST 04/18/2019 21     Globulin 04/18/2019 2.7     Cholesterol, Total 04/18/2019 233*    Triglycerides 04/18/2019 295*    HDL 04/18/2019 65*    LDL Calculated 04/18/2019 109*    VLDL Cholesterol Calcula* 04/18/2019 59         ASSESSMENT/PLAN:  1. Hypokalemia  Improving on last labs. Will recheck today. Also ruling out hyperaldo. - BASIC METABOLIC PANEL  - Magnesium  - ALDOSTERONE  - RENIN    2. Hypomagnesemia  Unchanged. Continue supplement for now. Recheck today. - BASIC METABOLIC PANEL  - Magnesium  - ALDOSTERONE  - RENIN    3. Essential hypertension  Stable for now. Advised to monitor BP closely for now. Discussed weight loss will help with improvement of BP.  - BASIC METABOLIC PANEL  - Magnesium  - ALDOSTERONE  - RENIN      No follow-ups on file. An electronic signature was used to authenticate this note.     --Xin Moy MD on 4/28/2019 at 11:50 AM

## 2019-05-04 RX ORDER — SPIRONOLACTONE 25 MG/1
25 TABLET ORAL DAILY
Qty: 30 TABLET | Refills: 5 | Status: SHIPPED | OUTPATIENT
Start: 2019-05-04 | End: 2019-10-29 | Stop reason: SDUPTHER

## 2019-05-15 ENCOUNTER — TELEPHONE (OUTPATIENT)
Dept: FAMILY MEDICINE CLINIC | Age: 44
End: 2019-05-15

## 2019-05-15 DIAGNOSIS — E87.6 HYPOKALEMIA: Primary | ICD-10-CM

## 2019-05-15 DIAGNOSIS — E83.42 HYPOMAGNESEMIA: ICD-10-CM

## 2019-05-15 NOTE — TELEPHONE ENCOUNTER
Patient is calling she has scheduled a lab visit on 5/22/19. Please place orders for a potassium and magnesium.

## 2019-05-22 DIAGNOSIS — E87.6 HYPOKALEMIA: ICD-10-CM

## 2019-05-22 DIAGNOSIS — E83.42 HYPOMAGNESEMIA: ICD-10-CM

## 2019-05-23 LAB
MAGNESIUM: 2 MG/DL (ref 1.8–2.4)
POTASSIUM SERPL-SCNC: 4 MMOL/L (ref 3.5–5.1)

## 2019-08-01 RX ORDER — LABETALOL 300 MG/1
TABLET, FILM COATED ORAL
Qty: 60 TABLET | Refills: 3 | Status: SHIPPED | OUTPATIENT
Start: 2019-08-01 | End: 2019-11-29 | Stop reason: SDUPTHER

## 2019-10-24 RX ORDER — METHYLDOPA 250 MG/1
TABLET, FILM COATED ORAL
Qty: 90 TABLET | Refills: 4 | Status: SHIPPED | OUTPATIENT
Start: 2019-10-24 | End: 2020-03-12

## 2019-10-30 RX ORDER — SPIRONOLACTONE 25 MG/1
TABLET ORAL
Qty: 30 TABLET | Refills: 4 | Status: SHIPPED | OUTPATIENT
Start: 2019-10-30 | End: 2020-03-12

## 2019-11-08 RX ORDER — LEVOTHYROXINE SODIUM 175 UG/1
TABLET ORAL
Qty: 30 TABLET | Refills: 4 | Status: SHIPPED | OUTPATIENT
Start: 2019-11-08 | End: 2020-02-26 | Stop reason: SDUPTHER

## 2019-12-02 RX ORDER — LABETALOL 300 MG/1
TABLET, FILM COATED ORAL
Qty: 60 TABLET | Refills: 5 | Status: SHIPPED | OUTPATIENT
Start: 2019-12-02 | End: 2020-02-26 | Stop reason: SDUPTHER

## 2020-02-26 NOTE — TELEPHONE ENCOUNTER
Last Seen: 4/25/2019    Last Writen: 12- x 5 refills  Pt. Asking it to be sent to mail order.   RX adjusted    Next Appointment: Not scheduled    Requested Prescriptions     Pending Prescriptions Disp Refills    labetalol (NORMODYNE) 300 MG tablet 180 tablet 1     Sig: TAKE ONE TABLET BY MOUTH TWICE A DAY    levothyroxine (SYNTHROID) 175 MCG tablet 90 tablet 1     Sig: TAKE ONE TABLET BY MOUTH DAILY

## 2020-02-27 RX ORDER — SPIRONOLACTONE 25 MG/1
TABLET ORAL
Qty: 30 TABLET | Refills: 4 | Status: CANCELLED | OUTPATIENT
Start: 2020-02-27

## 2020-02-27 RX ORDER — LABETALOL 300 MG/1
TABLET, FILM COATED ORAL
Qty: 180 TABLET | Refills: 0 | Status: SHIPPED | OUTPATIENT
Start: 2020-02-27 | End: 2020-06-15

## 2020-02-27 RX ORDER — LEVOTHYROXINE SODIUM 175 UG/1
TABLET ORAL
Qty: 90 TABLET | Refills: 0 | Status: SHIPPED | OUTPATIENT
Start: 2020-02-27 | End: 2020-03-03 | Stop reason: SDUPTHER

## 2020-03-03 RX ORDER — LEVOTHYROXINE SODIUM 175 UG/1
TABLET ORAL
Qty: 90 TABLET | Refills: 0 | Status: SHIPPED | OUTPATIENT
Start: 2020-03-03 | End: 2020-06-05

## 2020-03-03 NOTE — TELEPHONE ENCOUNTER
Last office visit 4/25/2019     Next office visit scheduled None scheduled.       Requested Prescriptions     Pending Prescriptions Disp Refills    levothyroxine (SYNTHROID) 175 MCG tablet 90 tablet 0     Sig: TAKE ONE TABLET BY MOUTH DAILY

## 2020-03-12 NOTE — TELEPHONE ENCOUNTER
Refill Request     Last Seen: 4/25/2019    Last Written: 10/30/19, 10/24/19    Next Appointment:   Future Appointments   Date Time Provider Mag Wilson   3/31/2020  8:00 AM MD MALCOM Tatum [facility]             Requested Prescriptions     Pending Prescriptions Disp Refills    methyldopa (ALDOMET) 250 MG tablet 270 tablet 1     Sig: TAKE ONE TABLET BY MOUTH THREE TIMES A DAY    spironolactone (ALDACTONE) 25 MG tablet 90 tablet 1     Sig: TAKE ONE TABLET BY MOUTH DAILY

## 2020-03-12 NOTE — TELEPHONE ENCOUNTER
Pt calling asking if Dr. Guerrero Counts can send medications to [facility].   Pt is needing these two prescriptions sent

## 2020-03-13 RX ORDER — SPIRONOLACTONE 25 MG/1
TABLET ORAL
Qty: 90 TABLET | Refills: 1 | Status: SHIPPED | OUTPATIENT
Start: 2020-03-13 | End: 2020-03-30

## 2020-03-13 RX ORDER — METHYLDOPA 250 MG/1
TABLET, FILM COATED ORAL
Qty: 270 TABLET | Refills: 1 | Status: SHIPPED | OUTPATIENT
Start: 2020-03-13 | End: 2020-03-23

## 2020-03-23 RX ORDER — METHYLDOPA 250 MG/1
TABLET, FILM COATED ORAL
Qty: 90 TABLET | Refills: 1 | Status: SHIPPED | OUTPATIENT
Start: 2020-03-23 | End: 2020-03-24 | Stop reason: SDUPTHER

## 2020-03-24 RX ORDER — METHYLDOPA 250 MG/1
TABLET, FILM COATED ORAL
Qty: 90 TABLET | Refills: 1 | Status: SHIPPED | OUTPATIENT
Start: 2020-03-24 | End: 2020-05-26

## 2020-03-26 ENCOUNTER — TELEPHONE (OUTPATIENT)
Dept: FAMILY MEDICINE CLINIC | Age: 45
End: 2020-03-26

## 2020-03-26 NOTE — TELEPHONE ENCOUNTER
Received a fax from [phone] stating that Methyldopa is not available. 250 and 500 mg of Methyldopa is on back order. They are asking for alternative be sent.

## 2020-03-31 RX ORDER — SPIRONOLACTONE 25 MG/1
TABLET ORAL
Qty: 90 TABLET | Refills: 1 | Status: SHIPPED | OUTPATIENT
Start: 2020-03-31 | End: 2020-06-15

## 2020-05-26 RX ORDER — METHYLDOPA 250 MG/1
TABLET, FILM COATED ORAL
Qty: 90 TABLET | Refills: 0 | Status: SHIPPED | OUTPATIENT
Start: 2020-05-26 | End: 2020-06-15

## 2020-05-26 NOTE — TELEPHONE ENCOUNTER
LM for pt to schedule marcia. for hypertension follow up.    Can do VV with Dr. Randolph Mcgovern if she has been checking BP at home

## 2020-06-05 RX ORDER — LEVOTHYROXINE SODIUM 175 UG/1
TABLET ORAL
Qty: 90 TABLET | Refills: 0 | Status: SHIPPED | OUTPATIENT
Start: 2020-06-05 | End: 2020-08-31

## 2020-06-08 ENCOUNTER — TELEPHONE (OUTPATIENT)
Dept: FAMILY MEDICINE CLINIC | Age: 45
End: 2020-06-08

## 2020-06-08 NOTE — PROGRESS NOTES
Routine labs ordered based on hx and previous labs. Please let her know she needs to fast for 6-8 hours for these. Thank you.

## 2020-06-11 DIAGNOSIS — I10 ESSENTIAL HYPERTENSION: ICD-10-CM

## 2020-06-11 DIAGNOSIS — Z00.00 HEALTHCARE MAINTENANCE: ICD-10-CM

## 2020-06-11 DIAGNOSIS — E78.2 MIXED HYPERLIPIDEMIA: ICD-10-CM

## 2020-06-11 DIAGNOSIS — Z86.39 HISTORY OF HYPOKALEMIA: ICD-10-CM

## 2020-06-11 LAB
A/G RATIO: 1.9 (ref 1.1–2.2)
ALBUMIN SERPL-MCNC: 3.9 G/DL (ref 3.4–5)
ALP BLD-CCNC: 60 U/L (ref 40–129)
ALT SERPL-CCNC: 6 U/L (ref 10–40)
ANION GAP SERPL CALCULATED.3IONS-SCNC: 10 MMOL/L (ref 3–16)
AST SERPL-CCNC: 9 U/L (ref 15–37)
BASOPHILS ABSOLUTE: 0 K/UL (ref 0–0.2)
BASOPHILS RELATIVE PERCENT: 0.9 %
BILIRUB SERPL-MCNC: 0.4 MG/DL (ref 0–1)
BUN BLDV-MCNC: 8 MG/DL (ref 7–20)
CALCIUM SERPL-MCNC: 9.3 MG/DL (ref 8.3–10.6)
CHLORIDE BLD-SCNC: 101 MMOL/L (ref 99–110)
CHOLESTEROL, TOTAL: 201 MG/DL (ref 0–199)
CO2: 25 MMOL/L (ref 21–32)
CREAT SERPL-MCNC: 0.6 MG/DL (ref 0.6–1.1)
EOSINOPHILS ABSOLUTE: 0.2 K/UL (ref 0–0.6)
EOSINOPHILS RELATIVE PERCENT: 3.6 %
GFR AFRICAN AMERICAN: >60
GFR NON-AFRICAN AMERICAN: >60
GLOBULIN: 2.1 G/DL
GLUCOSE BLD-MCNC: 93 MG/DL (ref 70–99)
HCT VFR BLD CALC: 39.7 % (ref 36–48)
HDLC SERPL-MCNC: 93 MG/DL (ref 40–60)
HEMOGLOBIN: 12.8 G/DL (ref 12–16)
LDL CHOLESTEROL CALCULATED: 80 MG/DL
LYMPHOCYTES ABSOLUTE: 1.3 K/UL (ref 1–5.1)
LYMPHOCYTES RELATIVE PERCENT: 24.1 %
MAGNESIUM: 1.7 MG/DL (ref 1.8–2.4)
MCH RBC QN AUTO: 28.8 PG (ref 26–34)
MCHC RBC AUTO-ENTMCNC: 32.3 G/DL (ref 31–36)
MCV RBC AUTO: 89.1 FL (ref 80–100)
MONOCYTES ABSOLUTE: 0.3 K/UL (ref 0–1.3)
MONOCYTES RELATIVE PERCENT: 5 %
NEUTROPHILS ABSOLUTE: 3.7 K/UL (ref 1.7–7.7)
NEUTROPHILS RELATIVE PERCENT: 66.4 %
PDW BLD-RTO: 14.8 % (ref 12.4–15.4)
PLATELET # BLD: 213 K/UL (ref 135–450)
PMV BLD AUTO: 8.2 FL (ref 5–10.5)
POTASSIUM SERPL-SCNC: 3.6 MMOL/L (ref 3.5–5.1)
RBC # BLD: 4.46 M/UL (ref 4–5.2)
SODIUM BLD-SCNC: 136 MMOL/L (ref 136–145)
T3 TOTAL: 1.1 NG/ML (ref 0.8–2)
T4 FREE: 1.6 NG/DL (ref 0.9–1.8)
TOTAL PROTEIN: 6 G/DL (ref 6.4–8.2)
TRIGL SERPL-MCNC: 138 MG/DL (ref 0–150)
TSH REFLEX: 0.18 UIU/ML (ref 0.27–4.2)
VLDLC SERPL CALC-MCNC: 28 MG/DL
WBC # BLD: 5.5 K/UL (ref 4–11)

## 2020-06-12 LAB
ESTIMATED AVERAGE GLUCOSE: 105.4 MG/DL
HBA1C MFR BLD: 5.3 %

## 2020-06-15 ENCOUNTER — TELEMEDICINE (OUTPATIENT)
Dept: FAMILY MEDICINE CLINIC | Age: 45
End: 2020-06-15
Payer: COMMERCIAL

## 2020-06-15 PROCEDURE — 99214 OFFICE O/P EST MOD 30 MIN: CPT | Performed by: FAMILY MEDICINE

## 2020-06-15 RX ORDER — LISINOPRIL 20 MG/1
20 TABLET ORAL NIGHTLY
Qty: 90 TABLET | Refills: 0 | Status: SHIPPED | OUTPATIENT
Start: 2020-06-15 | End: 2020-08-31

## 2020-06-15 RX ORDER — METOPROLOL SUCCINATE 50 MG/1
50 TABLET, EXTENDED RELEASE ORAL NIGHTLY
Qty: 90 TABLET | Refills: 0 | Status: SHIPPED | OUTPATIENT
Start: 2020-06-15 | End: 2020-08-31

## 2020-06-15 ASSESSMENT — PATIENT HEALTH QUESTIONNAIRE - PHQ9
SUM OF ALL RESPONSES TO PHQ9 QUESTIONS 1 & 2: 0
SUM OF ALL RESPONSES TO PHQ QUESTIONS 1-9: 0
SUM OF ALL RESPONSES TO PHQ QUESTIONS 1-9: 0
1. LITTLE INTEREST OR PLEASURE IN DOING THINGS: 0
2. FEELING DOWN, DEPRESSED OR HOPELESS: 0

## 2020-06-15 NOTE — PROGRESS NOTES
6/15/2020    TELEHEALTH EVALUATION -- Audio/Visual (During TWSCL-44 public health emergency)    HPI:   Chief Complaint   Patient presents with    Hypertension    Hypothyroidism    Other     pt uses multiple pharmacy         Caryle Rival (:  1975) has requested an audio/video evaluation for the following concern(s):    Hypertension   This is a chronic problem. The current episode started more than 1 year ago. The problem has been gradually worsening since onset. The problem is uncontrolled. Pertinent negatives include no anxiety, blurred vision, chest pain, headaches, malaise/fatigue, peripheral edema or shortness of breath. Agents associated with hypertension include thyroid hormones. Risk factors for coronary artery disease include obesity and dyslipidemia (IGT). Past treatments include central alpha agonists, beta blockers and diuretics. The current treatment provides mild improvement. There are no compliance problems (has been doing great with diet and exercise, lost weight). Identifiable causes of hypertension include a thyroid problem. Hypothyroidism: Recent symptoms: none. She denies weight gain, cold intolerance, heat intolerance, constipation and diarrhea. Patient is  taking her medication consistently on an empty stomach. Lab Results   Component Value Date    TSH 0.90 2019    TSH 0.60 2018    TSH 9.49 (H) 2017        Review of Systems   Constitutional: Negative for malaise/fatigue. Eyes: Negative for blurred vision. Respiratory: Negative for shortness of breath. Cardiovascular: Negative for chest pain. Neurological: Negative for headaches. Prior to Visit Medications    Medication Sig Taking?  Authorizing Provider   levothyroxine (SYNTHROID) 175 MCG tablet TAKE 1 TABLET DAILY Yes Maxine Prado MD   methyldopa (ALDOMET) 250 MG tablet TAKE ONE TABLET BY MOUTH THREE TIMES A DAY Yes Maxine Prado MD   spironolactone (ALDACTONE) 25 MG tablet TAKE ONE Jasmeet Alcaraz MD   labetalol (NORMODYNE) 300 MG tablet TAKE ONE TABLET BY MOUTH TWICE A DAY Yes Dieudonne Estevez MD       Social History     Tobacco Use    Smoking status: Never Smoker    Smokeless tobacco: Never Used   Substance Use Topics    Alcohol use: Yes     Alcohol/week: 21.0 standard drinks     Types: 15 Cans of beer, 6 Shots of liquor per week     Comment: 3 days    Drug use: No        Allergies   Allergen Reactions    Nickel Other (See Comments)     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 Ask me about.  Hypertension     Localized osteoarthrosis, lower leg 2/17/2016    Obesity     Thyroid disease    ,   Social History     Tobacco Use    Smoking status: Never Smoker    Smokeless tobacco: Never Used   Substance Use Topics    Alcohol use: Yes     Alcohol/week: 21.0 standard drinks     Types: 15 Cans of beer, 6 Shots of liquor per week     Comment: 3 days    Drug use: No       PHYSICAL EXAMINATION:  [ INSTRUCTIONS:  \"[x]\" Indicates a positive item  \"[]\" Indicates a negative item  -- DELETE ALL ITEMS NOT EXAMINED]  Vital Signs: (As obtained by patient/caregiver or practitioner observation)    Blood pressure- 160/100  Heart rate-    Respiratory rate-    Temperature-  Pulse oximetry-     Constitutional: [x] Appears well-developed and well-nourished [x] No apparent distress      [] Abnormal-   Mental status  [] Alert and awake  [] Oriented to person/place/time []Able to follow commands      Eyes:  EOM    []  Normal  [] Abnormal-  Sclera  []  Normal  [] Abnormal -         Discharge []  None visible  [] Abnormal -    HENT:   [] Normocephalic, atraumatic.   [] Abnormal   [] Mouth/Throat: Mucous membranes are moist.     External Ears [] Normal  [] Abnormal-     Neck: [] No visualized mass     Pulmonary/Chest: [x] Respiratory effort normal.  [x] No visualized signs of difficulty breathing or respiratory distress        [] Abnormal-      Musculoskeletal:   [] Normal gait with no signs of ataxia         [] Normal range of motion of neck        [] Abnormal-       Neurological:        [] No Facial Asymmetry (Cranial nerve 7 motor function) (limited exam to video visit)          [] No gaze palsy        [] Abnormal-         Skin:        [] No significant exanthematous lesions or discoloration noted on facial skin         [] Abnormal-            Psychiatric:       [] Normal Affect [] No Hallucinations        [] Abnormal-     Other pertinent observable physical exam findings-     Lab Review   Orders Only on 06/11/2020   Component Date Value    Hemoglobin A1C 06/11/2020 5.3     eAG 06/11/2020 105.4     Cholesterol, Total 06/11/2020 201*    Triglycerides 06/11/2020 138     HDL 06/11/2020 93*    LDL Calculated 06/11/2020 80     VLDL Cholesterol Calcula* 06/11/2020 28     Magnesium 06/11/2020 1.70*    TSH 06/11/2020 0.18*    Sodium 06/11/2020 136     Potassium 06/11/2020 3.6     Chloride 06/11/2020 101     CO2 06/11/2020 25     Anion Gap 06/11/2020 10     Glucose 06/11/2020 93     BUN 06/11/2020 8     CREATININE 06/11/2020 0.6     GFR Non- 06/11/2020 >60     GFR  06/11/2020 >60     Calcium 06/11/2020 9.3     Total Protein 06/11/2020 6.0*    Alb 06/11/2020 3.9     Albumin/Globulin Ratio 06/11/2020 1.9     Total Bilirubin 06/11/2020 0.4     Alkaline Phosphatase 06/11/2020 60     ALT 06/11/2020 6*    AST 06/11/2020 9*    Globulin 06/11/2020 2.1     WBC 06/11/2020 5.5     RBC 06/11/2020 4.46     Hemoglobin 06/11/2020 12.8     Hematocrit 06/11/2020 39.7     MCV 06/11/2020 89.1     MCH 06/11/2020 28.8     MCHC 06/11/2020 32.3     RDW 06/11/2020 14.8     Platelets 37/32/3723 213     MPV 06/11/2020 8.2     Neutrophils % COVID-19 and provide necessary medical care. The patient (and/or legal guardian) has also been advised to contact this office for worsening conditions or problems, and seek emergency medical treatment and/or call 911 if deemed necessary. Patient identification was verified at the start of the visit: Yes    Total time spent on this encounter: Not billed by time    Services were provided through a video synchronous discussion virtually to substitute for in-person clinic visit. Patient and provider were located at their individual homes. --Fco Gibbs MD on 6/15/2020 at 4:02 PM    An electronic signature was used to authenticate this note.

## 2020-06-16 ASSESSMENT — ENCOUNTER SYMPTOMS
BLURRED VISION: 0
SHORTNESS OF BREATH: 0

## 2020-09-01 RX ORDER — SPIRONOLACTONE 25 MG/1
TABLET ORAL
Qty: 90 TABLET | Refills: 1 | Status: SHIPPED | OUTPATIENT
Start: 2020-09-01 | End: 2021-04-20

## 2020-09-01 RX ORDER — LISINOPRIL 20 MG/1
TABLET ORAL
Qty: 90 TABLET | Refills: 1 | Status: SHIPPED | OUTPATIENT
Start: 2020-09-01 | End: 2021-03-08

## 2020-09-01 RX ORDER — METOPROLOL SUCCINATE 50 MG/1
TABLET, EXTENDED RELEASE ORAL
Qty: 90 TABLET | Refills: 1 | Status: SHIPPED | OUTPATIENT
Start: 2020-09-01 | End: 2021-03-08

## 2020-09-01 RX ORDER — LEVOTHYROXINE SODIUM 175 UG/1
TABLET ORAL
Qty: 90 TABLET | Refills: 0 | Status: SHIPPED | OUTPATIENT
Start: 2020-09-01 | End: 2020-11-30

## 2020-09-01 NOTE — TELEPHONE ENCOUNTER
Please contact patient. She is due for labs. Orders already in [address] [place] [address] Rd.

## 2020-11-30 RX ORDER — LEVOTHYROXINE SODIUM 175 UG/1
TABLET ORAL
Qty: 90 TABLET | Refills: 0 | Status: SHIPPED | OUTPATIENT
Start: 2020-11-30 | End: 2021-02-16

## 2020-11-30 NOTE — TELEPHONE ENCOUNTER
Refill Request     Last Seen: 6/15/2020    Last Written: 9/1/2020    Next Appointment:   No future appointments.           Requested Prescriptions     Pending Prescriptions Disp Refills    levothyroxine (SYNTHROID) 175 MCG tablet [Pharmacy Med Name: LEVOTHYROXIN TAB 0.175MG] 90 tablet 1     Sig: TAKE 1 TABLET DAILY

## 2020-11-30 NOTE — TELEPHONE ENCOUNTER
Patient due for thyroid blood work. Orders already in [address] [place] [address] Rd. Please call to see if she can get it done sometime soon?

## 2021-02-12 DIAGNOSIS — E83.42 HYPOMAGNESEMIA: ICD-10-CM

## 2021-02-12 DIAGNOSIS — E03.9 HYPOTHYROIDISM, UNSPECIFIED TYPE: ICD-10-CM

## 2021-02-12 DIAGNOSIS — I10 ESSENTIAL HYPERTENSION: ICD-10-CM

## 2021-02-12 LAB
ANION GAP SERPL CALCULATED.3IONS-SCNC: 16 MMOL/L (ref 3–16)
BUN BLDV-MCNC: 10 MG/DL (ref 7–20)
CALCIUM SERPL-MCNC: 9.8 MG/DL (ref 8.3–10.6)
CHLORIDE BLD-SCNC: 98 MMOL/L (ref 99–110)
CO2: 23 MMOL/L (ref 21–32)
CREAT SERPL-MCNC: 0.6 MG/DL (ref 0.6–1.1)
GFR AFRICAN AMERICAN: >60
GFR NON-AFRICAN AMERICAN: >60
GLUCOSE BLD-MCNC: 105 MG/DL (ref 70–99)
MAGNESIUM: 1.5 MG/DL (ref 1.8–2.4)
POTASSIUM SERPL-SCNC: 3.7 MMOL/L (ref 3.5–5.1)
SODIUM BLD-SCNC: 137 MMOL/L (ref 136–145)
T4 FREE: 1.7 NG/DL (ref 0.9–1.8)
TSH REFLEX: 0.03 UIU/ML (ref 0.27–4.2)

## 2021-02-13 LAB — T3 TOTAL: 1.08 NG/ML (ref 0.8–2)

## 2021-02-16 RX ORDER — LEVOTHYROXINE SODIUM 0.15 MG/1
150 TABLET ORAL DAILY
Qty: 30 TABLET | Refills: 1 | Status: SHIPPED | OUTPATIENT
Start: 2021-02-16 | End: 2021-04-19

## 2021-02-16 RX ORDER — CALCIUM CARBONATE/VITAMIN D3 500-10/5ML
1 LIQUID (ML) ORAL 2 TIMES DAILY
Qty: 60 CAPSULE | Refills: 1 | Status: SHIPPED | OUTPATIENT
Start: 2021-02-16 | End: 2022-10-27

## 2021-03-30 ENCOUNTER — TELEPHONE (OUTPATIENT)
Dept: FAMILY MEDICINE CLINIC | Age: 46
End: 2021-03-30

## 2021-03-30 DIAGNOSIS — Z00.00 ROUTINE GENERAL MEDICAL EXAMINATION AT A HEALTH CARE FACILITY: Primary | ICD-10-CM

## 2021-03-30 DIAGNOSIS — E03.9 HYPOTHYROIDISM, UNSPECIFIED TYPE: ICD-10-CM

## 2021-03-30 DIAGNOSIS — E83.42 HYPOMAGNESEMIA: ICD-10-CM

## 2021-03-30 DIAGNOSIS — E78.2 MIXED HYPERLIPIDEMIA: ICD-10-CM

## 2021-03-30 NOTE — TELEPHONE ENCOUNTER
----- Message from Dani Gutierres sent at 3/30/2021  1:30 PM EDT -----  Subject: Message to Provider    QUESTIONS  Information for Provider? Pt needs an order for labs for her thyroid and   magnesium before her appointment on the 6/7  ---------------------------------------------------------------------------  --------------  CALL BACK INFO  What is the best way for the office to contact you? OK to leave message on   voicemail  Preferred Call Back Phone Number? [phone]  ---------------------------------------------------------------------------  --------------  SCRIPT ANSWERS  Relationship to Patient?  Self

## 2021-04-19 RX ORDER — LEVOTHYROXINE SODIUM 0.15 MG/1
TABLET ORAL
Qty: 30 TABLET | Refills: 0 | Status: SHIPPED | OUTPATIENT
Start: 2021-04-19 | End: 2021-05-18

## 2021-04-19 NOTE — TELEPHONE ENCOUNTER
Refill Request     Last Seen: 6/15/2020    Last Written: 3/8/2021    Next Appointment:   Future Appointments   Date Time Provider Mag Wilson   6/7/2021  3:45 PM MD [facility]       Future appointment scheduled      Requested Prescriptions     Pending Prescriptions Disp Refills    levothyroxine (SYNTHROID) 150 MCG tablet [Pharmacy Med Name: LEVOTHYROXINE 150 MCG TABLET] 30 tablet 0     Sig: TAKE ONE TABLET BY MOUTH DAILY

## 2021-04-20 RX ORDER — SPIRONOLACTONE 25 MG/1
TABLET ORAL
Qty: 90 TABLET | Refills: 1 | Status: SHIPPED | OUTPATIENT
Start: 2021-04-20 | End: 2021-10-25 | Stop reason: SDUPTHER

## 2021-05-13 ENCOUNTER — TELEPHONE (OUTPATIENT)
Dept: FAMILY MEDICINE CLINIC | Age: 46
End: 2021-05-13

## 2021-05-13 NOTE — TELEPHONE ENCOUNTER
----- Message from Ezrachula Arias sent at 5/12/2021  1:30 PM EDT -----  Subject: Appointment Request    Reason for Call: Routine Physical Exam    QUESTIONS  Type of Appointment? Established Patient  Reason for appointment request? Available appointments did not meet   patient need  Additional Information for Provider? PT starts a new job soon and will be   unavailable M-F [phone]. PT wants to know if a new appointment can be made   before or after that time. ---------------------------------------------------------------------------  --------------  [employer] INFO  What is the best way for the office to contact you? OK to leave message on   voicemail  Preferred Call Back Phone Number? [phone]  ---------------------------------------------------------------------------  --------------  SCRIPT ANSWERS  Relationship to Patient? Self  Have your symptoms changed? No  Have you been diagnosed with, tested for, or told that you are suspected   of having COVID-19 (Coronavirus)? No  Have you had a fever or taken medication to treat a fever within the past   3 days? No  Have you had a cough, shortness of breath or flu-like symptoms within the   past 3 days? No  Do you currently have flu-like symptoms including fever or chills, cough,   shortness of breath, or difficulty breathing, or new loss of taste or   smell? No  (Service Expert  click yes below to proceed with [employer] As Usual   Scheduling)?  Yes

## 2021-06-15 NOTE — TELEPHONE ENCOUNTER
Last office visit 6/15/2020     Last written 3-8-2021    Next office visit scheduled  Not scheduled    Requested Prescriptions     Pending Prescriptions Disp Refills    metoprolol succinate (TOPROL XL) 50 MG extended release tablet 90 tablet 0     Sig: TAKE 1 TABLET NIGHTLY

## 2021-06-16 RX ORDER — METOPROLOL SUCCINATE 50 MG/1
TABLET, EXTENDED RELEASE ORAL
Qty: 90 TABLET | Refills: 0 | Status: SHIPPED | OUTPATIENT
Start: 2021-06-16 | End: 2021-08-30

## 2021-06-16 RX ORDER — LISINOPRIL 20 MG/1
TABLET ORAL
Qty: 90 TABLET | Refills: 0 | Status: SHIPPED | OUTPATIENT
Start: 2021-06-16 | End: 2021-08-30

## 2021-06-28 DIAGNOSIS — E83.42 HYPOMAGNESEMIA: ICD-10-CM

## 2021-06-28 DIAGNOSIS — E03.9 HYPOTHYROIDISM, UNSPECIFIED TYPE: ICD-10-CM

## 2021-06-28 DIAGNOSIS — Z00.00 ROUTINE GENERAL MEDICAL EXAMINATION AT A HEALTH CARE FACILITY: ICD-10-CM

## 2021-06-28 LAB
A/G RATIO: 1.9 (ref 1.1–2.2)
ALBUMIN SERPL-MCNC: 4.7 G/DL (ref 3.4–5)
ALP BLD-CCNC: 68 U/L (ref 40–129)
ALT SERPL-CCNC: 29 U/L (ref 10–40)
ANION GAP SERPL CALCULATED.3IONS-SCNC: 16 MMOL/L (ref 3–16)
AST SERPL-CCNC: 21 U/L (ref 15–37)
BASOPHILS ABSOLUTE: 0 K/UL (ref 0–0.2)
BASOPHILS RELATIVE PERCENT: 0.9 %
BILIRUB SERPL-MCNC: 0.6 MG/DL (ref 0–1)
BUN BLDV-MCNC: 29 MG/DL (ref 7–20)
CALCIUM SERPL-MCNC: 10.8 MG/DL (ref 8.3–10.6)
CHLORIDE BLD-SCNC: 99 MMOL/L (ref 99–110)
CHOLESTEROL, TOTAL: 215 MG/DL (ref 0–199)
CO2: 22 MMOL/L (ref 21–32)
CREAT SERPL-MCNC: 1 MG/DL (ref 0.6–1.1)
EOSINOPHILS ABSOLUTE: 0.1 K/UL (ref 0–0.6)
EOSINOPHILS RELATIVE PERCENT: 2.2 %
GFR AFRICAN AMERICAN: >60
GFR NON-AFRICAN AMERICAN: 59
GLOBULIN: 2.5 G/DL
GLUCOSE BLD-MCNC: 102 MG/DL (ref 70–99)
HCT VFR BLD CALC: 44 % (ref 36–48)
HDLC SERPL-MCNC: 54 MG/DL (ref 40–60)
HEMOGLOBIN: 14.6 G/DL (ref 12–16)
HEPATITIS C ANTIBODY INTERPRETATION: NORMAL
LDL CHOLESTEROL CALCULATED: 142 MG/DL
LYMPHOCYTES ABSOLUTE: 1.5 K/UL (ref 1–5.1)
LYMPHOCYTES RELATIVE PERCENT: 26.8 %
MAGNESIUM: 1.9 MG/DL (ref 1.8–2.4)
MCH RBC QN AUTO: 28.2 PG (ref 26–34)
MCHC RBC AUTO-ENTMCNC: 33.2 G/DL (ref 31–36)
MCV RBC AUTO: 85.1 FL (ref 80–100)
MONOCYTES ABSOLUTE: 0.4 K/UL (ref 0–1.3)
MONOCYTES RELATIVE PERCENT: 7 %
NEUTROPHILS ABSOLUTE: 3.4 K/UL (ref 1.7–7.7)
NEUTROPHILS RELATIVE PERCENT: 63.1 %
PDW BLD-RTO: 14.2 % (ref 12.4–15.4)
PLATELET # BLD: 299 K/UL (ref 135–450)
PMV BLD AUTO: 8.3 FL (ref 5–10.5)
POTASSIUM SERPL-SCNC: 4.5 MMOL/L (ref 3.5–5.1)
RBC # BLD: 5.17 M/UL (ref 4–5.2)
SODIUM BLD-SCNC: 137 MMOL/L (ref 136–145)
T4 FREE: 2.5 NG/DL (ref 0.9–1.8)
TOTAL PROTEIN: 7.2 G/DL (ref 6.4–8.2)
TRIGL SERPL-MCNC: 95 MG/DL (ref 0–150)
TSH SERPL DL<=0.05 MIU/L-ACNC: 0.02 UIU/ML (ref 0.27–4.2)
VLDLC SERPL CALC-MCNC: 19 MG/DL
WBC # BLD: 5.5 K/UL (ref 4–11)

## 2021-06-29 LAB
ESTIMATED AVERAGE GLUCOSE: 114 MG/DL
HBA1C MFR BLD: 5.6 %

## 2021-06-30 DIAGNOSIS — E03.9 HYPOTHYROIDISM, UNSPECIFIED TYPE: ICD-10-CM

## 2021-06-30 DIAGNOSIS — R79.9 ELEVATED BUN: ICD-10-CM

## 2021-06-30 DIAGNOSIS — E83.52 HYPERCALCEMIA: Primary | ICD-10-CM

## 2021-06-30 RX ORDER — LEVOTHYROXINE SODIUM 0.1 MG/1
TABLET ORAL
Qty: 30 TABLET | Refills: 1 | Status: SHIPPED | OUTPATIENT
Start: 2021-06-30 | End: 2021-08-30

## 2021-08-30 RX ORDER — METOPROLOL SUCCINATE 50 MG/1
TABLET, EXTENDED RELEASE ORAL
Qty: 90 TABLET | Refills: 0 | Status: SHIPPED | OUTPATIENT
Start: 2021-08-30 | End: 2021-11-28

## 2021-08-30 RX ORDER — LISINOPRIL 20 MG/1
TABLET ORAL
Qty: 90 TABLET | Refills: 0 | Status: SHIPPED | OUTPATIENT
Start: 2021-08-30 | End: 2021-11-10

## 2021-08-30 NOTE — TELEPHONE ENCOUNTER
Refill Request     Last Seen: Last Seen Department: 6/15/2020  Last Seen by PCP: 4/25/19     Last Written: 6/16/21 90 tablet 0 refill     Next Appointment: 12/14/21     Future Appointments   Date Time Provider Mag Wilson   12/14/2021 10:00 AM MD [facility] [place] - [facility]       Future appointment scheduled      Requested Prescriptions     Pending Prescriptions Disp Refills    metoprolol succinate (TOPROL XL) 50 MG extended release tablet [Pharmacy Med Name: Melanee Geoffrey TAB 50MG ER] 90 tablet 0     Sig: TAKE 1 TABLET NIGHTLY    lisinopril (PRINIVIL;ZESTRIL) 20 MG tablet [Pharmacy Med Name: LISINOPRIL TAB 20MG] 90 tablet 0     Sig: TAKE 1 TABLET NIGHTLY

## 2021-09-27 NOTE — TELEPHONE ENCOUNTER
Last office visit 6/15/2020     Last written 8-    Next office visit scheduled 12/14/2021    Requested Prescriptions     Pending Prescriptions Disp Refills    levothyroxine (SYNTHROID) 100 MCG tablet [Pharmacy Med Name: SYNTHROID TAB 100MCG] 30 tablet 0     Sig: TAKE 1 TABLET DAILY

## 2021-09-29 RX ORDER — LEVOTHYROXINE SODIUM 0.1 MG/1
TABLET ORAL
Qty: 30 TABLET | Refills: 0 | Status: SHIPPED | OUTPATIENT
Start: 2021-09-29 | End: 2021-10-27

## 2021-11-28 RX ORDER — METOPROLOL SUCCINATE 50 MG/1
TABLET, EXTENDED RELEASE ORAL
Qty: 90 TABLET | Refills: 0 | Status: SHIPPED | OUTPATIENT
Start: 2021-11-28 | End: 2022-02-09

## 2021-11-28 NOTE — TELEPHONE ENCOUNTER
.  Refill Request     Last Seen: Last Seen Department: 6/15/2020  Last Seen by PCP: 6/15/2020    Last Written: 8/30/21 90 with 0     Next Appointment:   Future Appointments   Date Time Provider Mag Wilson   12/14/2021 10:00 AM MD [facility]COM [place]  [place] - [place]           Requested Prescriptions     Pending Prescriptions Disp Refills    metoprolol succinate (TOPROL XL) 50 MG extended release tablet [Pharmacy Med Name: Renae Tolbert SUC TAB 50MG ER] 90 tablet 0     Sig: TAKE 1 TABLET NIGHTLY

## 2021-12-15 RX ORDER — LEVOTHYROXINE SODIUM 0.1 MG/1
TABLET ORAL
Qty: 60 TABLET | Refills: 0 | Status: SHIPPED | OUTPATIENT
Start: 2021-12-15 | End: 2022-02-02

## 2021-12-15 NOTE — TELEPHONE ENCOUNTER
Refill Request     Last Seen: Last Seen Department: 6/15/2020  Last Seen by PCP: 6/15/2020    Last Written: 10/27/21 60 tablet 0 refill     Next Appointment: 2/21/22     Future Appointments   Date Time Provider Mag Wilson   2/21/2022  1:45 PM MD [facility] [place] - [facility]       Future appointment scheduled      Requested Prescriptions     Pending Prescriptions Disp Refills    levothyroxine (SYNTHROID) 100 MCG tablet [Pharmacy Med Name: SYNTHROID TAB 100MCG] 60 tablet 0     Sig: TAKE 1 TABLET DAILY

## 2021-12-15 NOTE — TELEPHONE ENCOUNTER
Patient needs to have thyroid retested. Orders already placed. Please remind her - I would like her to do this now, and would prefer it not wait until her Feb appt. Thanks!

## 2022-01-12 RX ORDER — SPIRONOLACTONE 25 MG/1
TABLET ORAL
Qty: 90 TABLET | Refills: 0 | Status: SHIPPED | OUTPATIENT
Start: 2022-01-12 | End: 2022-04-04

## 2022-01-12 NOTE — TELEPHONE ENCOUNTER
Refill Request     Last Seen: Last Seen Department: 6/15/2020  Last Seen by PCP: 6/15/2020    Last Written: 10/27/21    Next Appointment:   Future Appointments   Date Time Provider Mag Wilson   2/21/2022  1:45 PM MD [facility]COM [place]  [place] [facility]       Future appointment scheduled      Requested Prescriptions     Pending Prescriptions Disp Refills    spironolactone (ALDACTONE) 25 MG tablet [Pharmacy Med Name: Merilee Fu  TAB 25MG] 90 tablet 0     Sig: TAKE 1 TABLET DAILY

## 2022-01-22 RX ORDER — LISINOPRIL 20 MG/1
TABLET ORAL
Qty: 90 TABLET | Refills: 0 | Status: SHIPPED | OUTPATIENT
Start: 2022-01-22 | End: 2022-04-06

## 2022-01-22 NOTE — TELEPHONE ENCOUNTER
.  Refill Request     Last Seen: Last Seen Department: 6/15/2020  Last Seen by PCP: 6/15/2020    Last Written: 11/10/21 90 with 0     Next Appointment:   Future Appointments   Date Time Provider Mag Wilson   2/21/2022  1:45 PM MD MALCOM [place]  [place] - [place]         Requested Prescriptions     Pending Prescriptions Disp Refills    lisinopril (PRINIVIL;ZESTRIL) 20 MG tablet [Pharmacy Med Name: LISINOPRIL TAB 20MG] 90 tablet 0     Sig: TAKE 1 TABLET NIGHTLY

## 2022-02-02 RX ORDER — LEVOTHYROXINE SODIUM 0.1 MG/1
TABLET ORAL
Qty: 60 TABLET | Refills: 0 | Status: SHIPPED | OUTPATIENT
Start: 2022-02-02 | End: 2022-03-23

## 2022-02-02 NOTE — TELEPHONE ENCOUNTER
Refill Request     Last Seen: Last Seen Department: 6/15/2020  Last Seen by PCP: 6/15/2020    Last Written: 12/15/2021 for #60 tablet with No Refills    Next Appointment:   Future Appointments   Date Time Provider Mag Katie   2/21/2022  1:45 PM MD MALCOM [place]  [place] - [place]       Future appointment scheduled      Requested Prescriptions     Pending Prescriptions Disp Refills    levothyroxine (SYNTHROID) 100 MCG tablet [Pharmacy Med Name: SYNTHROID TAB 100MCG] 60 tablet 0     Sig: TAKE 1 TABLET DAILY

## 2022-02-09 RX ORDER — METOPROLOL SUCCINATE 50 MG/1
TABLET, EXTENDED RELEASE ORAL
Qty: 90 TABLET | Refills: 1 | Status: SHIPPED | OUTPATIENT
Start: 2022-02-09 | End: 2022-07-05

## 2022-03-23 RX ORDER — LEVOTHYROXINE SODIUM 0.1 MG/1
TABLET ORAL
Qty: 60 TABLET | Refills: 0 | Status: SHIPPED | OUTPATIENT
Start: 2022-03-23 | End: 2022-05-13

## 2022-03-23 NOTE — TELEPHONE ENCOUNTER
Refill Request     Last Seen: Last Seen Department: 6/15/2020  Last Seen by PCP: Visit date not found    Last Written: 02/02/2022 60 tablet 0 Refills    Next Appointment:   Future Appointments   Date Time Provider Mag Wilson   4/18/2022  1:45 PM MD MALCOM [place]  [place] - [place]       Future appointment scheduled      Requested Prescriptions     Pending Prescriptions Disp Refills    levothyroxine (SYNTHROID) 100 MCG tablet [Pharmacy Med Name: SYNTHROID TAB 100MCG] 60 tablet 0     Sig: TAKE 1 TABLET DAILY

## 2022-04-04 NOTE — TELEPHONE ENCOUNTER
.  Refill Request     Last Seen: Last Seen Department: 6/15/2020  Last Seen by PCP: 6/15/2020    Last Written: 1-12-22 90 with 0     Next Appointment:   Future Appointments   Date Time Provider Mag Wilson   4/18/2022  1:45 PM MD MALCOM [place]  [place] - [place]       Future appointment scheduled      Requested Prescriptions     Pending Prescriptions Disp Refills    spironolactone (ALDACTONE) 25 MG tablet [Pharmacy Med Name: Dandre Bess  TAB 25MG] 90 tablet 0     Sig: TAKE 1 TABLET DAILY

## 2022-04-05 NOTE — TELEPHONE ENCOUNTER
Refill Request     Last Seen: Last Seen Department: 6/15/2020  Last Seen by PCP: Visit date not found    Last Written: 01/22/2022 90 Tablet 0 Refills    Next Appointment:   Future Appointments   Date Time Provider Mag Wilson   4/18/2022  1:45 PM MD MALCOM [place]  [place] - [place]       Future appointment scheduled      Requested Prescriptions     Pending Prescriptions Disp Refills    lisinopril (PRINIVIL;ZESTRIL) 20 MG tablet [Pharmacy Med Name: LISINOPRIL TAB 20MG] 90 tablet 0     Sig: TAKE 1 TABLET NIGHTLY

## 2022-04-06 RX ORDER — LISINOPRIL 20 MG/1
TABLET ORAL
Qty: 90 TABLET | Refills: 0 | Status: SHIPPED | OUTPATIENT
Start: 2022-04-06 | End: 2022-06-27

## 2022-04-06 RX ORDER — SPIRONOLACTONE 25 MG/1
TABLET ORAL
Qty: 90 TABLET | Refills: 0 | Status: SHIPPED | OUTPATIENT
Start: 2022-04-06 | End: 2022-06-20

## 2022-04-18 ENCOUNTER — OFFICE VISIT (OUTPATIENT)
Dept: FAMILY MEDICINE CLINIC | Age: 47
End: 2022-04-18
Payer: COMMERCIAL

## 2022-04-18 VITALS
OXYGEN SATURATION: 100 % | WEIGHT: 243 LBS | SYSTOLIC BLOOD PRESSURE: 124 MMHG | DIASTOLIC BLOOD PRESSURE: 82 MMHG | HEART RATE: 78 BPM | BODY MASS INDEX: 40.44 KG/M2

## 2022-04-18 DIAGNOSIS — E66.01 MORBID OBESITY WITH BODY MASS INDEX (BMI) OF 40.0 TO 44.9 IN ADULT (HCC): ICD-10-CM

## 2022-04-18 DIAGNOSIS — Z12.11 COLON CANCER SCREENING: ICD-10-CM

## 2022-04-18 DIAGNOSIS — Z00.00 ENCOUNTER FOR WELL ADULT EXAM WITHOUT ABNORMAL FINDINGS: ICD-10-CM

## 2022-04-18 DIAGNOSIS — Z00.00 ROUTINE GENERAL MEDICAL EXAMINATION AT A HEALTH CARE FACILITY: Primary | ICD-10-CM

## 2022-04-18 LAB
BASOPHILS ABSOLUTE: 0 K/UL (ref 0–0.2)
BASOPHILS RELATIVE PERCENT: 0.7 %
EOSINOPHILS ABSOLUTE: 0.3 K/UL (ref 0–0.6)
EOSINOPHILS RELATIVE PERCENT: 4 %
HCT VFR BLD CALC: 42.7 % (ref 36–48)
HEMOGLOBIN: 14.1 G/DL (ref 12–16)
LYMPHOCYTES ABSOLUTE: 1.9 K/UL (ref 1–5.1)
LYMPHOCYTES RELATIVE PERCENT: 26.6 %
MCH RBC QN AUTO: 26 PG (ref 26–34)
MCHC RBC AUTO-ENTMCNC: 33.1 G/DL (ref 31–36)
MCV RBC AUTO: 78.5 FL (ref 80–100)
MONOCYTES ABSOLUTE: 0.4 K/UL (ref 0–1.3)
MONOCYTES RELATIVE PERCENT: 5.4 %
NEUTROPHILS ABSOLUTE: 4.6 K/UL (ref 1.7–7.7)
NEUTROPHILS RELATIVE PERCENT: 63.3 %
PDW BLD-RTO: 13.2 % (ref 12.4–15.4)
PLATELET # BLD: 289 K/UL (ref 135–450)
PMV BLD AUTO: 8.4 FL (ref 5–10.5)
RBC # BLD: 5.44 M/UL (ref 4–5.2)
WBC # BLD: 7.3 K/UL (ref 4–11)

## 2022-04-18 PROCEDURE — 99396 PREV VISIT EST AGE 40-64: CPT | Performed by: FAMILY MEDICINE

## 2022-04-18 SDOH — ECONOMIC STABILITY: FOOD INSECURITY: WITHIN THE PAST 12 MONTHS, YOU WORRIED THAT YOUR FOOD WOULD RUN OUT BEFORE YOU GOT MONEY TO BUY MORE.: NEVER TRUE

## 2022-04-18 SDOH — ECONOMIC STABILITY: FOOD INSECURITY: WITHIN THE PAST 12 MONTHS, THE FOOD YOU BOUGHT JUST DIDN'T LAST AND YOU DIDN'T HAVE MONEY TO GET MORE.: NEVER TRUE

## 2022-04-18 ASSESSMENT — PATIENT HEALTH QUESTIONNAIRE - PHQ9
SUM OF ALL RESPONSES TO PHQ QUESTIONS 1-9: 0
1. LITTLE INTEREST OR PLEASURE IN DOING THINGS: 0
2. FEELING DOWN, DEPRESSED OR HOPELESS: 0
SUM OF ALL RESPONSES TO PHQ QUESTIONS 1-9: 0
SUM OF ALL RESPONSES TO PHQ9 QUESTIONS 1 & 2: 0

## 2022-04-18 ASSESSMENT — SOCIAL DETERMINANTS OF HEALTH (SDOH): HOW HARD IS IT FOR YOU TO PAY FOR THE VERY BASICS LIKE FOOD, HOUSING, MEDICAL CARE, AND HEATING?: NOT HARD AT ALL

## 2022-04-19 LAB
A/G RATIO: 2 (ref 1.1–2.2)
ALBUMIN SERPL-MCNC: 4.9 G/DL (ref 3.4–5)
ALP BLD-CCNC: 98 U/L (ref 40–129)
ALT SERPL-CCNC: 15 U/L (ref 10–40)
ANION GAP SERPL CALCULATED.3IONS-SCNC: 16 MMOL/L (ref 3–16)
AST SERPL-CCNC: 15 U/L (ref 15–37)
BILIRUB SERPL-MCNC: 0.4 MG/DL (ref 0–1)
BUN BLDV-MCNC: 13 MG/DL (ref 7–20)
CALCIUM SERPL-MCNC: 10.2 MG/DL (ref 8.3–10.6)
CHLORIDE BLD-SCNC: 101 MMOL/L (ref 99–110)
CHOLESTEROL, TOTAL: 226 MG/DL (ref 0–199)
CO2: 23 MMOL/L (ref 21–32)
CREAT SERPL-MCNC: 0.7 MG/DL (ref 0.6–1.1)
GFR AFRICAN AMERICAN: >60
GFR NON-AFRICAN AMERICAN: >60
GLUCOSE BLD-MCNC: 86 MG/DL (ref 70–99)
HDLC SERPL-MCNC: 64 MG/DL (ref 40–60)
LDL CHOLESTEROL CALCULATED: 143 MG/DL
POTASSIUM SERPL-SCNC: 4.3 MMOL/L (ref 3.5–5.1)
SODIUM BLD-SCNC: 140 MMOL/L (ref 136–145)
TOTAL PROTEIN: 7.4 G/DL (ref 6.4–8.2)
TRIGL SERPL-MCNC: 93 MG/DL (ref 0–150)
TSH REFLEX: 2.19 UIU/ML (ref 0.27–4.2)
VLDLC SERPL CALC-MCNC: 19 MG/DL

## 2022-04-19 ASSESSMENT — ENCOUNTER SYMPTOMS
ALLERGIC/IMMUNOLOGIC NEGATIVE: 1
RESPIRATORY NEGATIVE: 1
GASTROINTESTINAL NEGATIVE: 1

## 2022-04-19 NOTE — PROGRESS NOTES
Well Adult Note  Name: Syd Benitez Date: 2022   MRN: [id number] Sex: Female   Age: 48 y.o. Ethnicity: Non- / Non    : 1975 Race: White (non-)      Jassi Johnson is here for well adult exam.  History:  She has no concerns today other than she gained her weight back from previous weight loss. She is frustrated as she has not been able to sustain any weight loss over her lifetime. She reports being overweight since childhood. She states she has issues with snacking and overeating with meals. She would like a solution to help her that comes with less risk of gaining the weight back. Review of Systems   Constitutional: Negative. HENT: Negative. Respiratory: Negative. Cardiovascular: Negative. Gastrointestinal: Negative. Genitourinary: Negative. Musculoskeletal: Negative. Skin: Negative. Allergic/Immunologic: Negative. Neurological: Negative. Psychiatric/Behavioral: Negative. Allergies   Allergen Reactions    Nickel Other (See Comments)     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    Codeine Nausea Only, Rash and Nausea And Vomiting       Prior to Visit Medications    Medication Sig Taking? 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 Ask me about.     Hypertension     Localized osteoarthrosis, lower leg 2016    Obesity     Pupils: Pupils are equal, round, and reactive to light. Neck:      Vascular: No carotid bruit. Cardiovascular:      Rate and Rhythm: Normal rate and regular rhythm. Heart sounds: Normal heart sounds. No murmur heard. No friction rub. No gallop. Pulmonary:      Effort: Pulmonary effort is normal. No respiratory distress. Breath sounds: Normal breath sounds. No stridor. No wheezing, rhonchi or rales. Chest:   Breasts:      Right: No supraclavicular adenopathy. Left: No supraclavicular adenopathy. Abdominal:      General: Bowel sounds are normal.      Palpations: Abdomen is soft. There is no mass. Tenderness: There is no abdominal tenderness. There is no right CVA tenderness or left CVA tenderness. Musculoskeletal:      Cervical back: Neck supple. No muscular tenderness. Right lower leg: No edema. Left lower leg: No edema. Lymphadenopathy:      Head:      Right side of head: No submental, submandibular, tonsillar, preauricular, posterior auricular or occipital adenopathy. Left side of head: No submental, submandibular, tonsillar, preauricular, posterior auricular or occipital adenopathy. Cervical: No cervical adenopathy. Upper Body:      Right upper body: No supraclavicular adenopathy. Left upper body: No supraclavicular adenopathy. Lower Body: No right inguinal adenopathy. No left inguinal adenopathy. Skin:     General: Skin is warm and dry. Neurological:      General: No focal deficit present. Mental Status: She is alert. Deep Tendon Reflexes:      Reflex Scores:       Patellar reflexes are 2+ on the right side and 2+ on the left side. Achilles reflexes are 2+ on the right side and 2+ on the left side. Psychiatric:         Mood and Affect: Mood and affect normal.         Speech: Speech normal.         Behavior: Behavior normal. Behavior is cooperative.          Cognition and Memory: Cognition normal.           Assessment Plan   1. Routine general medical examination at a health care facility  -     CBC with Auto Differential  -     Comprehensive Metabolic Panel  -     TSH with Reflex  -     Lipid Panel  Health Maintenance reviewed - patient asked to schedule colonoscopy. Specific topics reviewed: importance of regular dental care, minimize junk food, importance of regular exercise, seat belts and sunscreen. 2. Morbid obesity with body mass index (BMI) of 40.0 to 44.9 in adult [facility])  -     Nito Colon MD, Bariatric Surgery, [facility]  She is interested in learning more about weight loss surgery as an option for long term weight loss. Will refer to healthy weight solutions she may learn more about this and make an informed decision about what might be right for her. 3. Colon cancer screening  -     [facility] - Dev Sibley MD, Gastroenterology (ERCP & E[facility]), [facility]  Due to family history of colon cancer screening in her mother, she is due for repeat colonoscopy. Previous one was with Dr. Petra Aragon. Will get them connected again.         Personalized Preventive Plan   Current Health Maintenance Status  Immunization History   Administered Date(s) Administered    COVID-19, Pfizer Purple top, DILUTE for use, 12+ yrs, 30mcg/0.3mL dose 07/23/2021, 08/14/2021    Influenza 10/21/2011, 09/09/2013    Influenza Vaccine, unspecified formulation 10/21/2011, 09/09/2013    Influenza Virus Vaccine 12/30/2014    Tdap (Boostrix, Adacel) 04/07/2017        Health Maintenance   Topic Date Due    Colorectal Cancer Screen  12/11/2018    COVID-19 Vaccine (3 - Booster for Pfizer series) 01/14/2022    A1C test (Diabetic or Prediabetic)  06/28/2022    Depression Screen  04/18/2023    TSH testing  04/18/2023    Potassium monitoring  04/18/2023    Creatinine monitoring  04/18/2023    Cervical cancer screen  05/05/2026    DTaP/Tdap/Td vaccine (2 - Td or Tdap) 04/07/2027    Lipid screen  04/18/2027    Flu vaccine Completed    Hepatitis C screen  Completed    HIV screen  Completed    Hepatitis A vaccine  Aged Out    Hepatitis B vaccine  Aged Out    Hib vaccine  Aged Out    Meningococcal (ACWY) vaccine  Aged Out    Pneumococcal 0-64 years Vaccine  Aged Out     Recommendations for [employer] Due: see orders and patient instructions/AVS.    Return in about 1 year (around 4/18/2023) for Annual physical.

## 2022-05-09 ENCOUNTER — TELEPHONE (OUTPATIENT)
Dept: BARIATRICS/WEIGHT MGMT | Age: 47
End: 2022-05-09

## 2022-05-09 NOTE — TELEPHONE ENCOUNTER
Lvm for patient regarding response to bariatric campaign. Office call back number was given if interested.

## 2022-05-10 ENCOUNTER — TELEPHONE (OUTPATIENT)
Dept: BARIATRICS/WEIGHT MGMT | Age: 47
End: 2022-05-10

## 2022-05-12 NOTE — TELEPHONE ENCOUNTER
Refill Request     CONFIRM preferrred pharmacy with the patient. If Mail Order Rx - Pend for 90 day refill. Last Seen: Last Seen Department: 4/18/2022  Last Seen by PCP: Visit date not found    Last Written: 03/23/22 60 Tablet 0 refills    Next Appointment:   No future appointments.     Requested Prescriptions     Pending Prescriptions Disp Refills    levothyroxine (SYNTHROID) 100 MCG tablet [Pharmacy Med Name: SYNTHROID TAB 100MCG] 60 tablet 0     Sig: TAKE 1 TABLET DAILY

## 2022-05-13 RX ORDER — LEVOTHYROXINE SODIUM 0.1 MG/1
TABLET ORAL
Qty: 90 TABLET | Refills: 3 | Status: SHIPPED | OUTPATIENT
Start: 2022-05-13

## 2022-06-16 ENCOUNTER — TELEPHONE (OUTPATIENT)
Dept: BARIATRICS/WEIGHT MGMT | Age: 47
End: 2022-06-16

## 2022-06-16 NOTE — TELEPHONE ENCOUNTER
Patient was sent Dr. Rosita Alanis digital bariatric seminar. Patient DOES have BWLS coverage with UMR (6mo consecutive diet, must sign up with [facility] [phone]) Bariatric benefit form scanned in media. *Spoke w/ pt.  Info given  No HX of WLS, BMI > 35  pk mailed

## 2022-06-20 RX ORDER — SPIRONOLACTONE 25 MG/1
TABLET ORAL
Qty: 90 TABLET | Refills: 0 | Status: SHIPPED | OUTPATIENT
Start: 2022-06-20 | End: 2022-09-01

## 2022-06-20 NOTE — TELEPHONE ENCOUNTER
Refill Request     CONFIRM preferrred pharmacy with the patient. If Mail Order Rx - Pend for 90 day refill.       Last Seen: Last Seen Department: 4/18/2022  Last Seen by PCP: 4/18/2022    Last Written: 4/8/2022 for #90 tablet    Next Appointment:   Future Appointments   Date Time Provider Mag Wilson   7/28/2022  8:45 AM MD Jesenia Villalta [facility]       Future appointment scheduled      Requested Prescriptions     Pending Prescriptions Disp Refills    spironolactone (ALDACTONE) 25 MG tablet [Pharmacy Med Name: SPIRONOLACT  TAB 25MG] 90 tablet 0     Sig: TAKE 1 TABLET DAILY

## 2022-06-27 RX ORDER — LISINOPRIL 20 MG/1
TABLET ORAL
Qty: 90 TABLET | Refills: 3 | Status: SHIPPED | OUTPATIENT
Start: 2022-06-27

## 2022-06-27 NOTE — TELEPHONE ENCOUNTER
Refill Request     CONFIRM preferrred pharmacy with the patient. If Mail Order Rx - Pend for 90 day refill.       Last Seen: Last Seen Department: 4/18/2022  Last Seen by PCP: 4/18/2022    Last Written: 04/06/2022 90 Tablet 0 Refills    Next Appointment:   Future Appointments   Date Time Provider Mag Wilson   7/28/2022  8:45 AM MD [facility]   4/18/2023  8:00 AM MD MALCOM [place]  Maryann - LIANNE       Future appointment scheduled      Requested Prescriptions     Pending Prescriptions Disp Refills    lisinopril (PRINIVIL;ZESTRIL) 20 MG tablet [Pharmacy Med Name: LISINOPRIL TAB 20MG] 90 tablet 0     Sig: TAKE 1 TABLET NIGHTLY

## 2022-07-05 RX ORDER — METOPROLOL SUCCINATE 50 MG/1
TABLET, EXTENDED RELEASE ORAL
Qty: 90 TABLET | Refills: 1 | Status: SHIPPED | OUTPATIENT
Start: 2022-07-05

## 2022-07-28 ENCOUNTER — OFFICE VISIT (OUTPATIENT)
Dept: BARIATRICS/WEIGHT MGMT | Age: 47
End: 2022-07-28
Payer: COMMERCIAL

## 2022-07-28 VITALS
WEIGHT: 253 LBS | HEART RATE: 84 BPM | DIASTOLIC BLOOD PRESSURE: 70 MMHG | SYSTOLIC BLOOD PRESSURE: 124 MMHG | HEIGHT: 64 IN | OXYGEN SATURATION: 97 % | BODY MASS INDEX: 43.19 KG/M2 | RESPIRATION RATE: 18 BRPM

## 2022-07-28 DIAGNOSIS — Z01.818 PREOPERATIVE CLEARANCE: ICD-10-CM

## 2022-07-28 DIAGNOSIS — I10 ESSENTIAL HYPERTENSION: ICD-10-CM

## 2022-07-28 DIAGNOSIS — E66.01 MORBID OBESITY WITH BMI OF 45.0-49.9, ADULT (HCC): Primary | ICD-10-CM

## 2022-07-28 DIAGNOSIS — K21.9 CHRONIC GERD: ICD-10-CM

## 2022-07-28 PROCEDURE — 99205 OFFICE O/P NEW HI 60 MIN: CPT | Performed by: SURGERY

## 2022-07-28 NOTE — PROGRESS NOTES
[facility]) Physicians   [facility]  Rosy Tripp MD, [address] [facility], [address] [place] [id number] . Phone: [phone]  Fax: [phone]       Chief Complaint   Patient presents with    Bariatric, Initial Visit     NP WLS UMR           HPI:    Ramu Dinero is a very pleasant 48 y.o. obese female ,   Body mass index is 43.43 kg/m². And multiple medical problems who is presenting for weight loss surgery evaluation and consultation by Dr. Yemi Antony. Patient has been struggling for several years now with obesity. Patient feels the weight is an obstacle to achieve and perform things in daily living as well risk on health. Tries to diet, and exercise but can't keep the weight off. Patient tried [employer] Diet, low fat diet, low carb diet and caloric restriction. .  Patient has not participated in meal replacement/liquid diets. Patient has participated in weight loss medications and other regimens, but with no sustainable weight loss. Patient  is very determined to lose weight and be healthy, and is interested in surgical weight loss for future weight loss. .    Otherwise patient denies any nausea, vomiting, fevers, chills, shortness of breath, chest pain, constipation or urinary symptoms.         Obesity related problems Rod Abdid is dealing with:  Patient Active Problem List   Diagnosis    Essential hypertension    Obesity    Hypothyroid    Right knee pain    Patellofemoral arthrosis    Localized osteoarthrosis, lower leg    Family history of malignant neoplasm of digestive organ    Disease of both eyes characterized by increased eye pressure    Morbid obesity with BMI of 45.0-49.9, adult ([address]    Prediabetes    Preoperative clearance    Chronic GERD           Pain Assessment   Denies any abdominal pain     Past Medical History:   Diagnosis Date    Chronic GERD 7/28/2022    Disease of both eyes characterized by increased eye pressure 4/23/2018    Glaucoma     Have a Cupped Optic Nerve. Ask me about. Hypertension     Localized osteoarthrosis, lower leg 2/17/2016    Obesity     Thyroid disease      Past Surgical History:   Procedure Laterality Date    BREAST BIOPSY      COLONOSCOPY  2006    Ask me about. Family History   Problem Relation Age of Onset    Arthritis Mother         Rheumatoid    Cancer Mother         Colon Cancer    Heart Disease Father         Quad Bypass    High Blood Pressure Father     Brain Cancer Sister 61    Heart Disease Maternal Grandmother 52    Heart Disease Maternal Grandfather     Emphysema Paternal Grandmother     Heart Disease Paternal Grandfather     Cancer Sister         Brain Tumor/ Cancer    Early Death Sister         Age 35     Social History     Tobacco Use    Smoking status: Never    Smokeless tobacco: Never   Substance Use Topics    Alcohol use: Not Currently     Alcohol/week: 21.0 standard drinks     Types: 15 Cans of beer, 6 Shots of liquor per week     Comment: 3 days         I counseled the patient on the risks of Smoking, ETOH or Drug use, and importance of completely avoiding them, otherwise patient risks surgery cancellation or post operative serious complications if they start using any. Scott Palomo acknowledged, agreed not to use and wants to proceed. 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       Current Outpatient Medications:     lisinopril (PRINIVIL;ZESTRIL) 20 MG tablet, TAKE 1 TABLET NIGHTLY, Disp: 90 tablet, Rfl: 3    spironolactone (ALDACTONE) 25 MG tablet, TAKE 1 TABLET DAILY, Disp: 90 tablet, Rfl: 0    levothyroxine (SYNTHROID) 100 MCG tablet, TAKE 1 TABLET DAILY, Disp: 90 tablet, Rfl: 3 metoprolol succinate (TOPROL XL) 50 MG extended release tablet, TAKE 1 TABLET NIGHTLY, Disp: 90 tablet, Rfl: 1    Magnesium Oxide 400 MG CAPS, Take 1 capsule by mouth 2 times daily, Disp: 60 capsule, Rfl: 1      Review of Systems - History obtained from the patient  General ROS: overweight   Psychological ROS: negative  Ophthalmic ROS: negative  Neurological ROS: negative  ENT ROS: negative  Allergy and Immunology ROS: negative  Hematological and Lymphatic ROS: negative  Endocrine ROS: overweight  Breast ROS: negative  Respiratory ROS: negative  Cardiovascular ROS: negative  Gastrointestinal ROS:negative  Genito-Urinary ROS: negative  Musculoskeletal ROS: weight effects on joints  Skin ROS: negative    Physical Exam   Constitutional: Patient is oriented to person, place, and time. Vital signs are normal. Patient  appears well-developed and well-nourished. Patient  is active and cooperative. Non-toxic appearance. No distress. HENT:   Head: Normocephalic and atraumatic. Head is without laceration. Right Ear: External ear normal. No lacerations. No drainage, swelling or tenderness. Left Ear: External ear normal. No lacerations. No drainage, swelling or tenderness. Nose/Mouth/Throat: Patient is wearing mask due to Covid-19 pandemic precautions, following CDC and health authorities guidelines. Eyes: Conjunctivae, EOM and lids are normal. Pupils are equal, round, and reactive to light. Right eye exhibits no discharge. No foreign body present in the right eye. Left eye exhibits no discharge. No foreign body present in the left eye. No scleral icterus. Neck: Trachea normal and normal range of motion. Neck supple. No JVD present. No tracheal tenderness present. Carotid bruit is not present. No rigidity. No tracheal deviation and no edema present. No thyromegaly present. Cardiovascular: Normal rate, regular rhythm, normal heart sounds, intact distal pulses and normal pulses.     Pulmonary/Chest: Effort normal Family history of malignant neoplasm of digestive organ    Disease of both eyes characterized by increased eye pressure    Morbid obesity with BMI of 45.0-49.9, adult ([address]    Prediabetes    Preoperative clearance    Chronic GERD          The patient underwent extensive dietary counseling with the registered dietitian. I have reviewed, discussed and agree with the dietary plan. Medical weight loss and different surgical options were discussed in details with patient. Shelly Mccracken is interested in surgical weight loss for future weight loss. Patient is interested in Laparoscopic Sleeve Gastrectomy, which I believe is an excellent option. I explained to the patient that surgery does carry a risk specially with the coexisting comorbid conditions the patient have. Surgery as well in obese patiens can carry more risk. Risks including but not limited to; Infection, bleeding, gastric leak or obstruction, persistent nausea, vomiting, or reflux, injury to surrounding structures, risks of anesthesia, stricture, delayed gastric emptying, staple line leak, incisional hernia, malnutrition , hair loss, and/ or Conversion to Open surgery may be necessary. Failure to lose or maintain weight loss, Gallstones or Kidney Stones, Deep Venous Thrombosis , pulmonary embolism and / or death. However I do believe the benefits outweighs that risk. Yarelis Bowen understands the risks and wants to proceed. We will proceed with pre-operative work up labs and studies. Will also petition patient's  insurance for approval for this procedure. I advised the patient that we can't guarantee final insurance approval.    Patient received dietary handouts and education. Patient advised that its their responsibility to follow up for studies, referrals and/or labs ordered today.    Also discussed in details the importance of follow up, as well following the recommendations and completing the whole program to improve outcomes when it comes to healthier lifestyle as well weight loss. Patient also advised about risks and benefits being on a strict dietary regimen as well using supplements. Patient agrees and wants to proceed with weight loss planning     Today's encounter included any number of the following: Bariatric Pre/Post operative work up/protocols, review of labs, imaging, provider notes, outside hospital records, performing examination/evaluation, counseling patient and/or family, ordering medications/tests, placing referrals and communication with referring physicians, coordination of care; discussing dietary plan/recall with the patient as well with registered dietitian and documentation in the EHR. Of note, the above was done during same day of the actual patient encounter. Obesity as a disease is considered a high risk to patients overall health and should therefore be considered a high risk disease state. Advised the patient that not getting there weight under control (weight loss hopefully will help with resolving/improving of the comorbid conditions),  that could increase risk of complications/worsening of those conditions on the long-term. Now with Covid-19 pandemic, CDC and health authorities does classify obese patients as vulnerable and high risk as well. Which makes weight loss a priority for improvement of their wellbeing and overall health. CDC has issued the following statement as far Obese patients being at Increased Risk of being critically ill from SARS-Cov-2  \"Severe obesity increases the risk of a serious breathing problem called acute respiratory distress syndrome (ARDS), which is a major complication of TCQND-18 and can cause difficulties with a doctors ability to provide respiratory support for seriously ill patients. People living with severe obesity can have multiple serious chronic diseases and underlying health conditions that can increase the risk of severe illness from COVID-19. \"       Patient Instructions     -Plan for Laparoscopic sleeve gastrectomy      Pre-operative work up Ordered:    - F/U in 4 weeks. - Nutrition Labs. - Protein Shake Trial.  - Psych Evaluation.   - Cardiac Clearance. - EGD (endoscopy to check your stomach). - Support Group Attendance. - Obtain letter of medical necessity (PCP Letter). - Quit Smoking,  Alcohol, Caffeine and Carbonated Drinks  - Obtain records for Weight History 2 yrs. - Start Regular Exercise and track your activities. - Start Tracking your food Intake and follow dietary guidelines. - Avoid Pregnancy for 2 yrs from date of surgery. (for female patients in childbearing age)  - F/U with [facility] ,  YOU NEED TO SIGN UP ASAP WITH [facility] ([address]) [phone]         Patient advised that its their responsibility to follow up for studies, referrals and/or labs ordered today. Patient received dietary handouts and education. Please note that some or all of this report was generated using voice recognition software. Please notify me in case of any questions about the content of this document, as some errors in transcription may have occurred .

## 2022-07-28 NOTE — PATIENT INSTRUCTIONS
-Plan for Laparoscopic sleeve gastrectomy      Pre-operative work up Ordered:    - F/U in 4 weeks. - Nutrition Labs. - Protein Shake Trial.  - Psych Evaluation.   - Cardiac Clearance. - EGD (endoscopy to check your stomach). - Support Group Attendance. - Obtain letter of medical necessity (PCP Letter). - Quit Smoking,  Alcohol, Caffeine and Carbonated Drinks  - Obtain records for Weight History 2 yrs. - Start Regular Exercise and track your activities. - Start Tracking your food Intake and follow dietary guidelines. - Avoid Pregnancy for 2 yrs from date of surgery. (for female patients in childbearing age)  - F/U with [address] [place] [address] ,  YOU NEED TO SIGN UP ASAP WITH [facility] ([address]) [phone]         Patient advised that its their responsibility to follow up for studies, referrals and/or labs ordered today. Patient received dietary handouts and education.

## 2022-07-28 NOTE — Clinical Note
Greatly appreciate the referral.  Excellent candidate for weight loss. We will keep you posted on Mimi lal.

## 2022-07-28 NOTE — PROGRESS NOTES
Lola Villalobos is a 48 y.o. female with a date of birth of 1975. Vitals:    07/28/22 0842   BP: 124/70   Pulse: 84   Resp: 18   SpO2: 97%    BMI: Body mass index is 43.43 kg/m². Obesity Classification: Class III    Weight History: Wt Readings from Last 3 Encounters:   04/18/22 243 lb (110.2 kg)   04/25/19 243 lb (110.2 kg)   04/18/19 241 lb (109.3 kg)       Patient's lowest adult weight was 155 lbs at age 19. Patient's highest adult weight was 260 lbs at age 30. Patient has participated in the following weight loss programs: [employer] [address], [employer] Diet, low fat diet, low carb diet and caloric restriction. .   Patient has not participated in meal replacement/liquid diets. Patient has participated in weight loss medications. Patient is not lactose intolerant. (Ice cream/ milk only per pt, states she is not sure)  Patient does not have Buddhism/cultural food concerns. Patient does not have food allergies. Patient does tolerate artificial sweeteners. 24 hour recall/food frequency chart:    Breakfast: 8 am. with 2 cups of cocca pepples and 2 cups of 2% milk with 1 cup of coffee with cream and sugar or skips breakfast  Snack: none  Lunch: rotisserie chicken lunch meat on white bun with carlotta lawanda cheese and chips-bag of chips  Snack: 5 pieces of starburst candy  Dinner: 3 large pieces of pepperoni pizza and 3 cheese sticks  Snack: 2 cups britni. Chip ice cream and      Drinks throughout the day: 3-12oz. Cans of mt dew, unsweet ice tea, water-16 oz/day, 1 cup of coffee    Do you drink alcohol? No. How often/how much alcohol do you drink: none    Patient does meet the criteria for binge eating disorder. Patient does have grazing. Patient does not have night eating. Patient does have a history of emotional eating or eating out of boredom. Surgery  Patient does feel confident in her ability to make these changes.   The patient's expectations of post-surgical eating habits are realistic. Patient states she does understand the consequences of not complying with post-op food guidelines. Patient states she does understands the long term changes in food intake that will be necessary for all occasions after surgery for the rest of her life. Patient is deemed nutritionally appropriate to proceed. Goals  Weight: 140 lbs. Health Improvement: get off blood pressure medications, overall feeling better about self, more energy to workout, live normal life without constantly thinking about food     Assessment  Nutritional Needs: RMR=(9.99 x 114.8 kg) + (6.25 x 162.6 cm) - (4.92 x 46 y.o.) -161 = 1766 kcal x 1.4 (light activity factor)= 2472 kcal - 1000 (for 2 lb weight loss/week)= 1472 kcal.    Plan  Plan/Recommendations: Start presurgical guidelines. Goals:   -Eat 4-5 times daily  -Avoid high fat and high sugar foods  -Include protein with all meals and snacks  -Avoid carbonation and caffeine  -Avoid calorie containing beverages  -Increase physical activity as tolerated    PES Statement:  Overweight/Obesity related to lack of exercise, sedentary lifestyle, unhealthy eating habits, and unsuccessful diet attempts as evidenced by BMI. Body mass index is 43.43 kg/m². Will follow up as necessary.     Keith Blair, RD, LD

## 2022-08-08 DIAGNOSIS — Z01.818 PREOPERATIVE CLEARANCE: ICD-10-CM

## 2022-08-08 DIAGNOSIS — E66.01 MORBID OBESITY WITH BMI OF 45.0-49.9, ADULT (HCC): ICD-10-CM

## 2022-08-08 DIAGNOSIS — I10 ESSENTIAL HYPERTENSION: ICD-10-CM

## 2022-08-08 DIAGNOSIS — K21.9 CHRONIC GERD: ICD-10-CM

## 2022-08-08 LAB
A/G RATIO: 1.8 (ref 1.1–2.2)
ALBUMIN SERPL-MCNC: 4.6 G/DL (ref 3.4–5)
ALP BLD-CCNC: 102 U/L (ref 40–129)
ALT SERPL-CCNC: 15 U/L (ref 10–40)
ANION GAP SERPL CALCULATED.3IONS-SCNC: 14 MMOL/L (ref 3–16)
AST SERPL-CCNC: 14 U/L (ref 15–37)
BILIRUB SERPL-MCNC: 0.5 MG/DL (ref 0–1)
BUN BLDV-MCNC: 12 MG/DL (ref 7–20)
CALCIUM SERPL-MCNC: 10 MG/DL (ref 8.3–10.6)
CHLORIDE BLD-SCNC: 99 MMOL/L (ref 99–110)
CHOLESTEROL, TOTAL: 210 MG/DL (ref 0–199)
CO2: 26 MMOL/L (ref 21–32)
CREAT SERPL-MCNC: 0.7 MG/DL (ref 0.6–1.1)
GFR AFRICAN AMERICAN: >60
GFR NON-AFRICAN AMERICAN: >60
GLUCOSE BLD-MCNC: 91 MG/DL (ref 70–99)
HDLC SERPL-MCNC: 60 MG/DL (ref 40–60)
INR BLD: 1.04 (ref 0.87–1.14)
IRON SATURATION: 33 % (ref 15–50)
IRON: 109 UG/DL (ref 37–145)
LDL CHOLESTEROL CALCULATED: 132 MG/DL
POTASSIUM SERPL-SCNC: 4.5 MMOL/L (ref 3.5–5.1)
PROTHROMBIN TIME: 13.5 SEC (ref 11.7–14.5)
SODIUM BLD-SCNC: 139 MMOL/L (ref 136–145)
TOTAL IRON BINDING CAPACITY: 334 UG/DL (ref 260–445)
TOTAL PROTEIN: 7.2 G/DL (ref 6.4–8.2)
TRIGL SERPL-MCNC: 91 MG/DL (ref 0–150)
TSH REFLEX: 3.27 UIU/ML (ref 0.27–4.2)
VLDLC SERPL CALC-MCNC: 18 MG/DL

## 2022-08-09 LAB
BASOPHILS ABSOLUTE: 0.1 K/UL (ref 0–0.2)
BASOPHILS RELATIVE PERCENT: 0.9 %
EOSINOPHILS ABSOLUTE: 0.2 K/UL (ref 0–0.6)
EOSINOPHILS RELATIVE PERCENT: 3.3 %
ESTIMATED AVERAGE GLUCOSE: 119.8 MG/DL
FOLATE: 4.23 NG/ML (ref 4.78–24.2)
HBA1C MFR BLD: 5.8 %
HCT VFR BLD CALC: 41.5 % (ref 36–48)
HEMOGLOBIN: 13.8 G/DL (ref 12–16)
LYMPHOCYTES ABSOLUTE: 1.8 K/UL (ref 1–5.1)
LYMPHOCYTES RELATIVE PERCENT: 27.7 %
MCH RBC QN AUTO: 26.1 PG (ref 26–34)
MCHC RBC AUTO-ENTMCNC: 33.4 G/DL (ref 31–36)
MCV RBC AUTO: 78.1 FL (ref 80–100)
MONOCYTES ABSOLUTE: 0.3 K/UL (ref 0–1.3)
MONOCYTES RELATIVE PERCENT: 4.3 %
NEUTROPHILS ABSOLUTE: 4.2 K/UL (ref 1.7–7.7)
NEUTROPHILS RELATIVE PERCENT: 63.8 %
PDW BLD-RTO: 13.3 % (ref 12.4–15.4)
PLATELET # BLD: 283 K/UL (ref 135–450)
PMV BLD AUTO: 8 FL (ref 5–10.5)
RBC # BLD: 5.31 M/UL (ref 4–5.2)
VITAMIN B-12: 269 PG/ML (ref 211–911)
VITAMIN D 25-HYDROXY: 29.5 NG/ML
WBC # BLD: 6.7 K/UL (ref 4–11)

## 2022-08-11 LAB
ALPHA-TOCOPHEROL: 8.2 MG/L (ref 5.5–18)
GAMMA-TOCOPHEROL: 2.1 MG/L (ref 0–6)
RETINYL PALMITATE: 0.03 MG/L (ref 0–0.1)
VITAMIN A LEVEL: 0.61 MG/L (ref 0.3–1.2)
VITAMIN A, INTERP: NORMAL

## 2022-08-12 LAB — VITAMIN B1 WHOLE BLOOD: 110 NMOL/L (ref 70–180)

## 2022-08-18 ENCOUNTER — OFFICE VISIT (OUTPATIENT)
Dept: BARIATRICS/WEIGHT MGMT | Age: 47
End: 2022-08-18
Payer: COMMERCIAL

## 2022-08-18 VITALS
WEIGHT: 248 LBS | SYSTOLIC BLOOD PRESSURE: 126 MMHG | DIASTOLIC BLOOD PRESSURE: 70 MMHG | BODY MASS INDEX: 42.34 KG/M2 | RESPIRATION RATE: 18 BRPM | HEIGHT: 64 IN | HEART RATE: 71 BPM | OXYGEN SATURATION: 98 %

## 2022-08-18 DIAGNOSIS — R73.03 PREDIABETES: ICD-10-CM

## 2022-08-18 DIAGNOSIS — E66.01 MORBID OBESITY WITH BMI OF 40.0-44.9, ADULT (HCC): Primary | ICD-10-CM

## 2022-08-18 DIAGNOSIS — K21.9 CHRONIC GERD: ICD-10-CM

## 2022-08-18 DIAGNOSIS — E78.2 MIXED HYPERLIPIDEMIA: ICD-10-CM

## 2022-08-18 DIAGNOSIS — I10 ESSENTIAL HYPERTENSION: ICD-10-CM

## 2022-08-18 PROCEDURE — 99214 OFFICE O/P EST MOD 30 MIN: CPT | Performed by: SURGERY

## 2022-08-18 RX ORDER — LANOLIN ALCOHOL/MO/W.PET/CERES
400 CREAM (GRAM) TOPICAL DAILY
Qty: 30 TABLET | Refills: 3 | Status: SHIPPED | OUTPATIENT
Start: 2022-08-18

## 2022-08-18 NOTE — PATIENT INSTRUCTIONS
Patient received dietary handouts and education.     Plan/Recommendations:   - Choose items with 10 grams or less from sugar and fat per serving   - Start walking 30 minutes each night   - Start daily MVI

## 2022-08-18 NOTE — PROGRESS NOTES
[facility]) Physicians   [employer]  Savanna Sheets MD, [facility], [address] [place] [address] . Phone: [phone]  Fax: [phone]          Chief Complaint   Patient presents with    Obesity     2nd pre-surg         HPI:     Latanya Funez is a very pleasant 48 y.o. female with Body mass index is 42.57 kg/m². / Chronic Obesity. Servando Terrazas has been struggling for several years now with obesity. Servando Terrazas feels the weight is an obstacle to achieve and perform things in daily living as well risk on health. Patient  is very determined to lose weight and be healthy, and is working towards  surgical weight loss to achieve this goal. Pre-operative clearance and work up pending. Working hard to keep good dietary habits as well level of activity. Patient denies any nausea, vomiting, fevers, chills, shortness of breath, chest pain, cough, constipation or difficulty urinating. 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 Ask me about. Hypertension     Localized osteoarthrosis, lower leg 2/17/2016    Obesity     Thyroid disease      Past Surgical History:   Procedure Laterality Date    BREAST BIOPSY      COLONOSCOPY  2006    Ask me about.      Family History   Problem Relation Age of Onset    Arthritis Mother         Rheumatoid    Cancer Mother         Colon Cancer    Heart Disease Father         Quad Bypass    High Blood Pressure Father     Brain Cancer Sister 61    Heart Disease Maternal Grandmother 52    Heart Disease Maternal Grandfather     Emphysema Paternal Grandmother     Heart Disease Paternal Grandfather     Cancer Sister         Brain Tumor/ Cancer    Early Death Sister         Age 35     Social History     Tobacco Use    Smoking status: Never    Smokeless tobacco: Never Substance Use Topics    Alcohol use: Not Currently     Alcohol/week: 21.0 standard drinks     Types: 15 Cans of beer, 6 Shots of liquor per week     Comment: 3 days     I counseled the patient on the importance of not smoking and risks of ETOH. Allergies   Allergen Reactions    Nickel Other (See Comments)     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    Codeine Nausea Only, Rash and Nausea And Vomiting     Vitals:    08/18/22 0916   BP: 126/70   Pulse: 71   Resp: 18   SpO2: 98%   Weight: 248 lb (112.5 kg)   Height: 5' 4\" (1.626 m)       Body mass index is 42.57 kg/m².     Lab Results   Component Value Date/Time    WBC 6.7 08/08/2022 01:53 PM    RBC 5.31 08/08/2022 01:53 PM    HGB 13.8 08/08/2022 01:53 PM    HCT 41.5 08/08/2022 01:53 PM    MCV 78.1 08/08/2022 01:53 PM    MCH 26.1 08/08/2022 01:53 PM    MCHC 33.4 08/08/2022 01:53 PM    MPV 8.0 08/08/2022 01:53 PM    NEUTOPHILPCT 63.8 08/08/2022 01:53 PM    LYMPHOPCT 27.7 08/08/2022 01:53 PM    MONOPCT 4.3 08/08/2022 01:53 PM    EOSRELPCT 3.3 08/08/2022 01:53 PM    BASOPCT 0.9 08/08/2022 01:53 PM    NEUTROABS 4.2 08/08/2022 01:53 PM    LYMPHSABS 1.8 08/08/2022 01:53 PM    MONOSABS 0.3 08/08/2022 01:53 PM    EOSABS 0.2 08/08/2022 01:53 PM     Lab Results   Component Value Date/Time     08/08/2022 01:53 PM    K 4.5 08/08/2022 01:53 PM    CL 99 08/08/2022 01:53 PM    CO2 26 08/08/2022 01:53 PM    ANIONGAP 14 08/08/2022 01:53 PM    GLUCOSE 91 08/08/2022 01:53 PM    BUN 12 08/08/2022 01:53 PM    CREATININE 0.7 08/08/2022 01:53 PM    LABGLOM >60 08/08/2022 01:53 PM    GFRAA >60 08/08/2022 01:53 PM    GFRAA >60 03/05/2013 08:12 AM    CALCIUM 10.0 08/08/2022 01:53 PM    PROT 7.2 08/08/2022 01:53 PM    PROT 7.0 03/05/2013 08:12 AM    LABALBU 4.6 08/08/2022 01:53 PM    AGRATIO 1.8 08/08/2022 01:53 PM    BILITOT 0.5 08/08/2022 01:53 PM    ALKPHOS 102 08/08/2022 01:53 PM    ALT 15 08/08/2022 01:53 PM    AST 14 08/08/2022 01:53 PM    GLOB 2.5 06/28/2021 09:55 AM     Lab Results Component Value Date/Time    CHOL 210 08/08/2022 01:53 PM    TRIG 91 08/08/2022 01:53 PM    HDL 60 08/08/2022 01:53 PM    HDL 86 10/21/2011 08:44 AM    LDLCALC 132 08/08/2022 01:53 PM    LABVLDL 18 08/08/2022 01:53 PM     Lab Results   Component Value Date/Time    TSHREFLEX 3.27 08/08/2022 01:53 PM     Lab Results   Component Value Date/Time    IRON 109 08/08/2022 01:53 PM    TIBC 334 08/08/2022 01:53 PM    LABIRON 33 08/08/2022 01:53 PM     Lab Results   Component Value Date/Time    OXPEGYKN80 269 08/08/2022 01:53 PM    FOLATE 4.23 08/08/2022 01:53 PM     Lab Results   Component Value Date/Time    VITD25 29.5 08/08/2022 01:53 PM     Lab Results   Component Value Date/Time    LABA1C 5.8 08/08/2022 01:53 PM    .8 08/08/2022 01:53 PM         Current Outpatient Medications:     metoprolol succinate (TOPROL XL) 50 MG extended release tablet, TAKE 1 TABLET NIGHTLY, Disp: 90 tablet, Rfl: 1    lisinopril (PRINIVIL;ZESTRIL) 20 MG tablet, TAKE 1 TABLET NIGHTLY, Disp: 90 tablet, Rfl: 3    spironolactone (ALDACTONE) 25 MG tablet, TAKE 1 TABLET DAILY, Disp: 90 tablet, Rfl: 0    levothyroxine (SYNTHROID) 100 MCG tablet, TAKE 1 TABLET DAILY, Disp: 90 tablet, Rfl: 3    Magnesium Oxide 400 MG CAPS, Take 1 capsule by mouth 2 times daily, Disp: 60 capsule, Rfl: 1    Review of Systems - History obtained from the patient  General ROS: negative  Psychological ROS: negative  Ophthalmic ROS: negative  Neurological ROS: negative  ENT ROS: negative  Allergy and Immunology ROS: negative  Hematological and Lymphatic ROS: negative  Endocrine ROS: negative  Breast ROS: negative  Respiratory ROS: negative  Cardiovascular ROS: negative  Gastrointestinal ROS:negative  Genito-Urinary ROS: negative  Musculoskeletal ROS: negative   Skin ROS: negative    Physical Exam   Vitals Reviewed   Constitutional: Patient is oriented to person, place, and time. Patient appears well-developed and well-nourished. Patient is active and cooperative. state. Advised the patient that not getting there weight under control, that could increase risk of complications/worsening of those conditions on the long-term. (Goal of weight loss surgery is to alleviate/control some of those co-morbidities)    Now with Covid-19 pandemic, CDC and health authorities does classify obese patients as vulnerable and high risk as well. Which makes weight loss a priority for improvement of their wellbeing and overall health. I encouraged the patient to continue exercise and keeping healthy eating habits. Discussed pre-op labs and work up till now. Also counseled the patient extensively on Surgery. The visit today, included any number of the following: Bariatric Preoperative work up/protocols, review of labs, imaging, provider notes, outside hospital records, performing examination/evaluation, counseling patient and/or family, ordering medications/tests, placing referrals and communication with referring physicians, coordination of care; discussing dietary plan/recall with the patient as well with registered dietitian and documentation in the EHR. Of note, the above was done during same day of the actual patient encounter. Nehal Radha is here for her second presurgical visit. Working on her preoperative clearances. Labs were done and reviewed with the patient today vitamin D and folic acid supplements were sent to her pharmacy. Overall making good progress. We will see the patient next month for continued follow-up. We discussed how her excess weight affects her overall health and importance of weight loss, healthy diet and active lifestyle to alleviate those co morbid conditions, otherwise risk deterioration. Morbid Obesity: Body mass index is 42.57 kg/m². [x] Continue to make dietary and lifestyle modifications. [x] Plan for Future laparoscopic sleeve gastrectomy. [x] Return for follow-up next month.       Chronic GERD:   [x] Continue to make dietary and lifestyle modifications. [] Continue Omeprazole. [] Continue Famotidine. [x] Plan for EGD to evaluate the stomach. Essential Hypertension:  [x] Continue to make dietary and lifestyle modifications. [x] Reviewed the importance of checking blood pressure. [x] Continue to follow up with their PCP for medication management and monitoring. Prediabetes:   [x] Continue to make dietary and lifestyle modifications. [x] Reviewed the importance of checking blood sugars. [x] Continue to follow up with their PCP for medication management and monitoring. Hyperlipidemia:   [x] Continue to make dietary and lifestyle modifications. [x] Continue to follow up with their PCP for medication management and monitoring. Patient advised that its their responsibility to follow up for studies, referrals and/or labs ordered today.

## 2022-08-18 NOTE — PROGRESS NOTES
Ingrid Marino lost 5 lbs over 3 weeks. Keeping food journal    Breakfast: None  Snack: [place] Yogurt   Lunch: Turkey/cheese/miracle whip sandwich on lc ww bread + carrots w/ greek yogurt ranch  Snack: Snack pack w/ yogurt almonds + blueberries + granola   Dinner: Turkey/cheese/miracle whip sandwich on lc ww bread   Snack: None    Is pt consuming smaller portions? yes using pre-portioned items    Is pt consuming at least 64 oz of fluids per day? yes 4 bottles  water/ decaf tea w/ steavia    Is pt consuming carbonated, caffeinated, or sugary beverages? No pop for past 5 days, 1/2 caff coffee w/ cream/stevia    Has pt sampled Unjury and/or Nectar protein? Purchased today    Has patient attended a support group?  Not scheduled     Exercise: nothing structured     Plan/Recommendations:   - Choose items with 10 grams or less from sugar and fat per serving   - Start walking 30 minutes each night   - Start daily MVI    Handouts: Frozen meals + cooking resources    MCKINLEY Javed, LD

## 2022-08-27 PROBLEM — Z01.818 PREOPERATIVE CLEARANCE: Status: RESOLVED | Noted: 2022-07-28 | Resolved: 2022-08-27

## 2022-09-01 RX ORDER — SPIRONOLACTONE 25 MG/1
TABLET ORAL
Qty: 90 TABLET | Refills: 1 | Status: SHIPPED | OUTPATIENT
Start: 2022-09-01

## 2022-09-01 NOTE — TELEPHONE ENCOUNTER
Refill Request     CONFIRM preferrred pharmacy with the patient. If Mail Order Rx - Pend for 90 day refill.       Last Seen: Last Seen Department: 4/18/2022  Last Seen by PCP: 4/18/2022    Last Written: 06/20/2022 90 tablet 0 refills     Next Appointment:   Future Appointments   Date Time Provider Mag Katie   9/22/2022  9:00 AM Fay Pierre MD [facility]   9/28/2022  9:15 AM DO Gillian Saxena [facility]   10/11/2022  9:00 AM Henny Dalal [facility]   4/18/2023  8:00 AM Kaden Mcgovern MD [facility] D[facility]       Future appointment scheduled      Requested Prescriptions     Pending Prescriptions Disp Refills    spironolactone (ALDACTONE) 25 MG tablet [Pharmacy Med Name: Leigh Daija  TAB 25MG] 90 tablet 0     Sig: TAKE 1 TABLET DAILY

## 2022-09-13 NOTE — PROGRESS NOTES
Patient reached ____ yes  ___X__ no   VM instructions left ___X_ yes   phone number _[phone]_______                                ____ no-office notified          Date __9/23/22_______  Time _1100______  Arrival __0900  hosp-endo____    Nothing to eat or drink after midnight  Responsible adult 25 or older to stay on site while you are here-drive you home-stay with you after  Follow any instructions your doctors office has given you  Bring a complete list of all your medications and supplements including name,dose,how often taken the day of your procedure  If you normally take the following medications in the morning please do so the AM of your procedure with a small sip of water       Heart,blood pressure,seizure,thyroid or breathing medications-use your inhalers       DO NOT take blood pressure medications ending in \"andrea\" or \"pril\" the AM of procedure or evening prior  Take half or your normal dose of any long acting insulins the night before your procedure-do not take any diabetic medications the AM of procedure  Follow your doctors instructions regarding stopping or taking  any blood thinners-if you do not have instructions-call them  Any questions call your doctor  Other _____Clear liquid diet day before procedure_________________________________________________________        Tara Whelan POLICY(subject to change)             The current policy is 2 visitors per patient. There are no children allowed. Everyone must mask. Visiting hours are 8a-8p. Overnight visitors will be at the discretion of the nurse.

## 2022-09-15 ENCOUNTER — TELEPHONE (OUTPATIENT)
Dept: BARIATRICS/WEIGHT MGMT | Age: 47
End: 2022-09-15

## 2022-09-15 NOTE — TELEPHONE ENCOUNTER
Left vm reminding pt of egd on 09/23 arrival time of 9:00 am. OB Intake    Patient presents for OB intake interview  Accompanied by: friend  Unplanned pregnancy, FOB involved and supportive  [id number]    Hx of  delivery prior to 36 weeks 6 days: no  Hx of hypertension:  no   Patient's last menstrual period was 2019 (exact date)  Estimated Date of Delivery: 20  Signs and Symptoms of pregnancy:  - breast tenderness, fatigue, frequent urination and positive home pregnancy test  - Constipation: no  - Headaches: yes  - Cramping/spotting: yes slight cramping, no spotting  - PICA cravings: no  - Diabetes: If you answer yes, please order 1 hr gtt testing, 50grams   History of gestational diabetes no   BMI >35  no   Advance maternal age >35 no   First degree relative with type 2 diabetes no   History of PCOS no   Current metformin use no   Prior history of macrosomia or LGA no    Prenatal labs including: Obstetric panel with 4th generation HIV and ultrasound  Last Pap:  unknown  Tdap - counseled to be given after 27 weeks  Influenza vaccine discussed and information sheet given  Vaccinated: no  Immunization Record    There is no immunization history on file for this patient  Hx of MRSA: no  Dental visit with last 6 months - no  If no, recommendations discussed  Negative for depression with PHQ2 score of 0  Interview education:  Tip Spar Pregnancy Essentials booklet given to patient  Reviewed and explained  Handouts given at today's visit     [address] me phone application guide   St  [address] [place] [address] support center   CDCs Response to [address] [place] [address] Maternal Fetal Medicine        Sequential screening pamphlet                   Cystic fibrosis information    MyChart activated (not 1518 years of age)  - No  - Code provided: Yes    N[facility] Family [facility]: No  ONAF form submitted: No      Interview done by : Britany Gastelum RN       19

## 2022-09-22 ENCOUNTER — OFFICE VISIT (OUTPATIENT)
Dept: BARIATRICS/WEIGHT MGMT | Age: 47
End: 2022-09-22
Payer: COMMERCIAL

## 2022-09-22 VITALS
HEIGHT: 64 IN | BODY MASS INDEX: 41.83 KG/M2 | HEART RATE: 73 BPM | WEIGHT: 245 LBS | OXYGEN SATURATION: 98 % | SYSTOLIC BLOOD PRESSURE: 126 MMHG | RESPIRATION RATE: 18 BRPM | DIASTOLIC BLOOD PRESSURE: 72 MMHG

## 2022-09-22 DIAGNOSIS — E66.01 MORBID OBESITY WITH BMI OF 40.0-44.9, ADULT (HCC): Primary | ICD-10-CM

## 2022-09-22 DIAGNOSIS — K21.9 CHRONIC GERD: ICD-10-CM

## 2022-09-22 DIAGNOSIS — R73.03 PREDIABETES: ICD-10-CM

## 2022-09-22 DIAGNOSIS — I10 ESSENTIAL HYPERTENSION: ICD-10-CM

## 2022-09-22 DIAGNOSIS — E78.2 MIXED HYPERLIPIDEMIA: ICD-10-CM

## 2022-09-22 PROCEDURE — 99214 OFFICE O/P EST MOD 30 MIN: CPT | Performed by: SURGERY

## 2022-09-22 NOTE — PATIENT INSTRUCTIONS
Patient received dietary handouts and education.     Plan/Recommendations:   - Eat Bfast by 9:30 AM  - Try protein powder w/ decaf coffee  - Add fruit/veggies with snacks

## 2022-09-22 NOTE — PROGRESS NOTES
[facility]) Physicians   [employer]  Nata Taylor MD, [facility], [address], [address] [facility] [address] . Phone: [phone]  Fax: [phone]          Chief Complaint   Patient presents with    Obesity     3rd pre-surg         HPI:     Sven Gomes is a very pleasant 48 y.o. female with Body mass index is 42.05 kg/m². / Chronic Obesity. Malaika Bhatti has been struggling for several years now with obesity. Malaika Bhatti feels the weight is an obstacle to achieve and perform things in daily living as well risk on health. Patient  is very determined to lose weight and be healthy, and is working towards  surgical weight loss to achieve this goal. Pre-operative clearance and work up pending. Working hard to keep good dietary habits as well level of activity. Patient denies any nausea, vomiting, fevers, chills, shortness of breath, chest pain, cough, constipation or difficulty urinating. 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 Ask me about. Hypertension     Localized osteoarthrosis, lower leg 2/17/2016    Mixed hyperlipidemia 8/18/2022    Obesity     Thyroid disease      Past Surgical History:   Procedure Laterality Date    BREAST BIOPSY      COLONOSCOPY  2006    Ask me about.      Family History   Problem Relation Age of Onset    Arthritis Mother         Rheumatoid    Cancer Mother         Colon Cancer    Heart Disease Father         Quad Bypass    High Blood Pressure Father     Brain Cancer Sister 61    Heart Disease Maternal Grandmother 52    Heart Disease Maternal Grandfather     Emphysema Paternal Grandmother     Heart Disease Paternal Grandfather     Cancer Sister         Brain Tumor/ Cancer    Early Death Sister         Age 35     Social History     Tobacco Use    Smoking GLOB 2.5 06/28/2021 09:55 AM     Lab Results   Component Value Date/Time    CHOL 210 08/08/2022 01:53 PM    TRIG 91 08/08/2022 01:53 PM    HDL 60 08/08/2022 01:53 PM    HDL 86 10/21/2011 08:44 AM    LDLCALC 132 08/08/2022 01:53 PM    LABVLDL 18 08/08/2022 01:53 PM     Lab Results   Component Value Date/Time    TSHREFLEX 3.27 08/08/2022 01:53 PM     Lab Results   Component Value Date/Time    IRON 109 08/08/2022 01:53 PM    TIBC 334 08/08/2022 01:53 PM    LABIRON 33 08/08/2022 01:53 PM     Lab Results   Component Value Date/Time    DEGWJBZG47 269 08/08/2022 01:53 PM    FOLATE 4.23 08/08/2022 01:53 PM     Lab Results   Component Value Date/Time    VITD25 29.5 08/08/2022 01:53 PM     Lab Results   Component Value Date/Time    LABA1C 5.8 08/08/2022 01:53 PM    .8 08/08/2022 01:53 PM         Current Outpatient Medications:     spironolactone (ALDACTONE) 25 MG tablet, TAKE 1 TABLET DAILY, Disp: 90 tablet, Rfl: 1    vitamin D (CHOLECALCIFEROL) [address]344 UNIT CAPS, Take 1 capsule by mouth once a week, Disp: 12 capsule, Rfl: 0    Cholecalciferol 50 MCG (2000 UT) TABS, Take 1 tablet by mouth daily Take 1 tablet by mouth daily.  Start this medication after you finish the weekly regimen, Disp: 90 tablet, Rfl: 2    folic acid (V-R FOLIC ACID) 421 MCG tablet, Take 1 tablet by mouth daily, Disp: 30 tablet, Rfl: 3    metoprolol succinate (TOPROL XL) 50 MG extended release tablet, TAKE 1 TABLET NIGHTLY, Disp: 90 tablet, Rfl: 1    lisinopril (PRINIVIL;ZESTRIL) 20 MG tablet, TAKE 1 TABLET NIGHTLY, Disp: 90 tablet, Rfl: 3    levothyroxine (SYNTHROID) 100 MCG tablet, TAKE 1 TABLET DAILY, Disp: 90 tablet, Rfl: 3    Magnesium Oxide 400 MG CAPS, Take 1 capsule by mouth 2 times daily, Disp: 60 capsule, Rfl: 1    Review of Systems - History obtained from the patient  General ROS: negative  Psychological ROS: negative  Ophthalmic ROS: negative  Neurological ROS: negative  ENT ROS: negative  Allergy and Immunology ROS: negative  Hematological counseling with registered dietician. I have reviewed, discussed and agree with the dietary plan. Patient is trying hard to keep good dietary and behavior modifications. Patient is monitoring portion sizes, food choices and liquid calories. Patient is trying to exercise regularly as much as possible. Obesity as a disease is considered a high risk to patients overall health and should therefore be considered a high risk disease state. Advised the patient that not getting there weight under control, that could increase risk of complications/worsening of those conditions on the long-term. (Goal of weight loss surgery is to alleviate/control some of those co-morbidities)    Now with Covid-19 pandemic, CDC and health authorities does classify obese patients as vulnerable and high risk as well. Which makes weight loss a priority for improvement of their wellbeing and overall health. I encouraged the patient to continue exercise and keeping healthy eating habits. Discussed pre-op labs and work up till now. Also counseled the patient extensively on Surgery. I did explain thoroughly to the patient that compliance with pre- and post op diet and other recommendations are integral part to improve the chances of successful weight loss and also not following it could end with serious health complications. Some strategies discussed today include but not limited to : 30/30/30 minutes rule, food diary, avoid fast food and packing/planing ahead, & increasing exercise. Also stressed to the patient importance of taking the multivitamins as instructed, otherwise risk significant complications.       The visit today, included any number of the following: Bariatric Preoperative work up/protocols, review of labs, imaging, provider notes, outside hospital records, performing examination/evaluation, counseling patient and/or family, ordering medications/tests, placing referrals and communication with referring physicians, coordination of care; discussing dietary plan/recall with the patient as well with registered dietitian and documentation in the EHR. Of note, the above was done during same day of the actual patient encounter. Stefanie Mcgovern is here for her third presurgical visit overall making good progress. Discussed the preoperative work-up so far. We will see the patient next month for continued follow-up. We discussed how her excess weight affects her overall health and importance of weight loss, healthy diet and active lifestyle to alleviate those co morbid conditions, otherwise risk deterioration. Morbid Obesity: Body mass index is 42.05 kg/m². [x] Continue to make dietary and lifestyle modifications. [x] Plan for Future laparoscopic sleeve gastrectomy. [x] Return for follow-up next month. Chronic GERD:   [x] Continue to make dietary and lifestyle modifications. [] Continue Omeprazole. [] Continue Famotidine. [x] Plan for EGD to evaluate the stomach. Essential Hypertension:  [x] Continue to make dietary and lifestyle modifications. [x] Reviewed the importance of checking blood pressure. [x] Continue to follow up with their PCP for medication management and monitoring. Prediabetes:   [x] Continue to make dietary and lifestyle modifications. [x] Reviewed the importance of checking blood sugars. [x] Continue to follow up with their PCP for medication management and monitoring. Hyperlipidemia:   [x] Continue to make dietary and lifestyle modifications. [x] Continue to follow up with their PCP for medication management and monitoring. Patient advised that its their responsibility to follow up for studies, referrals and/or labs ordered today.

## 2022-09-22 NOTE — PROGRESS NOTES
Keyla Reaves lost 3 lbs over 1 month. Wake up 7:30 AM  Breakfast: None  Snack: 11-12 PM: Greek yogurt   Lunch: 1-2 PM: Turkey/cheese/miracle whip sandwich on lc ww bread + carrots w/ greek yogurt ranch  Snack: 4-5 PM: String cheese  Dinner: 7 PM: Salad kit w/ apple/walnut/vinaigrette/chix  Snack: None    Is pt consuming smaller portions? yes using pre-portioned items    Is pt consuming at least 64 oz of fluids per day? yes 4 bottles  water/ decaf tea w/ steavia    Is pt consuming carbonated, caffeinated, or sugary beverages? No pop since last visit, 1/2 caff coffee w/ powdered creamer (10 calories, 0 fat/sugar) and stevia    Has pt sampled Unjury and/or Nectar protein? Yes likes Grape, plans to try more    Has patient attended a support group?  Scheduled Today 11/10 And over zoom + 12/6 zoom    Exercise: Walking 30 minutes/day steady pace    Plan/Recommendations:   - Eat Bfast by 9:30 AM  - Try protein powder w/ decaf coffee  - Add fruit/veggies with snacks    Handouts: none    Matilda Aid, RD, LD

## 2022-09-23 ENCOUNTER — ANESTHESIA EVENT (OUTPATIENT)
Dept: ENDOSCOPY | Age: 47
End: 2022-09-23
Payer: COMMERCIAL

## 2022-09-23 ENCOUNTER — ANESTHESIA (OUTPATIENT)
Dept: ENDOSCOPY | Age: 47
End: 2022-09-23
Payer: COMMERCIAL

## 2022-09-23 ENCOUNTER — HOSPITAL ENCOUNTER (OUTPATIENT)
Age: 47
Setting detail: OUTPATIENT SURGERY
Discharge: HOME | End: 2022-09-23
Attending: SURGERY | Admitting: SURGERY
Payer: COMMERCIAL

## 2022-09-23 VITALS
OXYGEN SATURATION: 98 % | HEIGHT: 64 IN | SYSTOLIC BLOOD PRESSURE: 127 MMHG | WEIGHT: 242.5 LBS | HEART RATE: 77 BPM | DIASTOLIC BLOOD PRESSURE: 88 MMHG | RESPIRATION RATE: 17 BRPM | BODY MASS INDEX: 41.4 KG/M2 | TEMPERATURE: 98.1 F

## 2022-09-23 DIAGNOSIS — K21.9 GASTROESOPHAGEAL REFLUX DISEASE, UNSPECIFIED WHETHER ESOPHAGITIS PRESENT: ICD-10-CM

## 2022-09-23 LAB — HCG(URINE) PREGNANCY TEST: NEGATIVE

## 2022-09-23 PROCEDURE — 3700000000 HC ANESTHESIA ATTENDED CARE: Performed by: SURGERY

## 2022-09-23 PROCEDURE — 2580000003 HC RX 258: Performed by: NURSE ANESTHETIST, CERTIFIED REGISTERED

## 2022-09-23 PROCEDURE — 7100000010 HC PHASE II RECOVERY - FIRST 15 MIN: Performed by: SURGERY

## 2022-09-23 PROCEDURE — 84703 CHORIONIC GONADOTROPIN ASSAY: CPT

## 2022-09-23 PROCEDURE — 2580000003 HC RX 258: Performed by: SURGERY

## 2022-09-23 PROCEDURE — 2500000003 HC RX 250 WO HCPCS: Performed by: NURSE ANESTHETIST, CERTIFIED REGISTERED

## 2022-09-23 PROCEDURE — 88305 TISSUE EXAM BY PATHOLOGIST: CPT

## 2022-09-23 PROCEDURE — 6360000002 HC RX W HCPCS: Performed by: NURSE ANESTHETIST, CERTIFIED REGISTERED

## 2022-09-23 PROCEDURE — 43239 EGD BIOPSY SINGLE/MULTIPLE: CPT | Performed by: SURGERY

## 2022-09-23 PROCEDURE — 7100000011 HC PHASE II RECOVERY - ADDTL 15 MIN: Performed by: SURGERY

## 2022-09-23 PROCEDURE — 2709999900 HC NON-CHARGEABLE SUPPLY: Performed by: SURGERY

## 2022-09-23 PROCEDURE — 3609012400 HC EGD TRANSORAL BIOPSY SINGLE/MULTIPLE: Performed by: SURGERY

## 2022-09-23 RX ORDER — PROPOFOL 10 MG/ML
INJECTION, EMULSION INTRAVENOUS PRN
Status: DISCONTINUED | OUTPATIENT
Start: 2022-09-23 | End: 2022-09-23 | Stop reason: SDUPTHER

## 2022-09-23 RX ORDER — LIDOCAINE HYDROCHLORIDE 20 MG/ML
INJECTION, SOLUTION EPIDURAL; INFILTRATION; INTRACAUDAL; PERINEURAL PRN
Status: DISCONTINUED | OUTPATIENT
Start: 2022-09-23 | End: 2022-09-23 | Stop reason: SDUPTHER

## 2022-09-23 RX ORDER — KETAMINE HCL IN NACL, ISO-OSM 100MG/10ML
SYRINGE (ML) INJECTION PRN
Status: DISCONTINUED | OUTPATIENT
Start: 2022-09-23 | End: 2022-09-23 | Stop reason: SDUPTHER

## 2022-09-23 RX ORDER — SODIUM CHLORIDE 9 MG/ML
INJECTION, SOLUTION INTRAVENOUS CONTINUOUS
Status: DISCONTINUED | OUTPATIENT
Start: 2022-09-23 | End: 2022-09-23 | Stop reason: HOSPADM

## 2022-09-23 RX ORDER — GLYCOPYRROLATE 0.2 MG/ML
INJECTION INTRAMUSCULAR; INTRAVENOUS PRN
Status: DISCONTINUED | OUTPATIENT
Start: 2022-09-23 | End: 2022-09-23 | Stop reason: SDUPTHER

## 2022-09-23 RX ORDER — OMEPRAZOLE 20 MG/1
20 CAPSULE, DELAYED RELEASE ORAL
Qty: 90 CAPSULE | Refills: 1 | Status: SHIPPED | OUTPATIENT
Start: 2022-09-23

## 2022-09-23 RX ORDER — SODIUM CHLORIDE 9 MG/ML
INJECTION, SOLUTION INTRAVENOUS CONTINUOUS PRN
Status: DISCONTINUED | OUTPATIENT
Start: 2022-09-23 | End: 2022-09-23 | Stop reason: SDUPTHER

## 2022-09-23 RX ADMIN — GLYCOPYRROLATE 0.2 MG: 0.2 INJECTION, SOLUTION INTRAMUSCULAR; INTRAVENOUS at 11:19

## 2022-09-23 RX ADMIN — PROPOFOL 50 MG: 10 INJECTION, EMULSION INTRAVENOUS at 11:23

## 2022-09-23 RX ADMIN — Medication 20 MG: at 11:21

## 2022-09-23 RX ADMIN — PROPOFOL 100 MG: 10 INJECTION, EMULSION INTRAVENOUS at 11:21

## 2022-09-23 RX ADMIN — SODIUM CHLORIDE: 9 INJECTION, SOLUTION INTRAVENOUS at 09:18

## 2022-09-23 RX ADMIN — SODIUM CHLORIDE: 9 INJECTION, SOLUTION INTRAVENOUS at 10:29

## 2022-09-23 RX ADMIN — LIDOCAINE HYDROCHLORIDE 100 MG: 20 INJECTION, SOLUTION EPIDURAL; INFILTRATION; INTRACAUDAL; PERINEURAL at 11:21

## 2022-09-23 RX ADMIN — PROPOFOL 50 MG: 10 INJECTION, EMULSION INTRAVENOUS at 11:25

## 2022-09-23 ASSESSMENT — PAIN SCALES - GENERAL: PAINLEVEL_OUTOF10: 0

## 2022-09-23 NOTE — ANESTHESIA PRE PROCEDURE
Department of Anesthesiology  Preprocedure Note       Name:  Frankie Gates   Age:  48 y.o.  :  1975                                          MRN:  [id number]         Date:  2022      Surgeon: Tang Peguero): Deforest Riedel, MD    Procedure: Procedure(s):  EGD DIAGNOSTIC ONLY    Medications prior to admission:   Prior to Admission medications    Medication Sig Start Date End Date Taking? Authorizing Provider   spironolactone (ALDACTONE) 25 MG tablet TAKE 1 TABLET DAILY 22   Mark Ladd MD   vitamin D (CHOLECALCIFEROL) 94593 UNIT CAPS Take 1 capsule by mouth once a week 22  Deforest Riedel, MD   folic acid (V-R FOLIC ACID) 154 MCG tablet Take 1 tablet by mouth daily 22   Deforest Riedel, MD   metoprolol succinate (TOPROL XL) 50 MG extended release tablet TAKE 1 TABLET NIGHTLY 22   Chris Reaves DO   lisinopril (PRINIVIL;ZESTRIL) 20 MG tablet TAKE 1 TABLET NIGHTLY 22   Mark Ladd MD   levothyroxine (SYNTHROID) 100 MCG tablet TAKE 1 TABLET DAILY 22   Mark Ladd MD   Magnesium Oxide 400 MG CAPS Take 1 capsule by mouth 2 times daily 21   Mark Ladd MD       Current medications:    Current Facility-Administered Medications   Medication Dose Route Frequency Provider Last Rate Last Admin    0.9 % sodium chloride infusion   IntraVENous Continuous Deforest Riedel,  mL/hr at 22 1029 New Bag at 22 1029     Facility-Administered Medications Ordered in Other Encounters   Medication Dose Route Frequency Provider Last Rate Last Admin    0.9 % sodium chloride infusion   IntraVENous Continuous PRN GueritaUMBERTO Dawson - CRNA   New Bag at 22 2304       Allergies:     Allergies   Allergen Reactions    Nickel Other (See Comments)     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    Codeine Nausea Only, Rash and Nausea And Vomiting       Problem List:    Patient Active Problem List   Diagnosis Code    Essential hypertension I10    Obesity E66.9 (112.5 kg)     Body mass index is 41.63 kg/m². CBC:   Lab Results   Component Value Date/Time    WBC 6.7 08/08/2022 01:53 PM    RBC 5.31 08/08/2022 01:53 PM    HGB 13.8 08/08/2022 01:53 PM    HCT 41.5 08/08/2022 01:53 PM    MCV 78.1 08/08/2022 01:53 PM    RDW 13.3 08/08/2022 01:53 PM     08/08/2022 01:53 PM       CMP:   Lab Results   Component Value Date/Time     08/08/2022 01:53 PM    K 4.5 08/08/2022 01:53 PM    CL 99 08/08/2022 01:53 PM    CO2 26 08/08/2022 01:53 PM    BUN 12 08/08/2022 01:53 PM    CREATININE 0.7 08/08/2022 01:53 PM    GFRAA >60 08/08/2022 01:53 PM    GFRAA >60 03/05/2013 08:12 AM    AGRATIO 1.8 08/08/2022 01:53 PM    LABGLOM >60 08/08/2022 01:53 PM    GLUCOSE 91 08/08/2022 01:53 PM    PROT 7.2 08/08/2022 01:53 PM    PROT 7.0 03/05/2013 08:12 AM    CALCIUM 10.0 08/08/2022 01:53 PM    BILITOT 0.5 08/08/2022 01:53 PM    ALKPHOS 102 08/08/2022 01:53 PM    AST 14 08/08/2022 01:53 PM    ALT 15 08/08/2022 01:53 PM       POC Tests: No results for input(s): POCGLU, POCNA, POCK, POCCL, POCBUN, POCHEMO, POCHCT in the last 72 hours.     Coags:   Lab Results   Component Value Date/Time    PROTIME 13.5 08/08/2022 01:53 PM    INR 1.04 08/08/2022 01:53 PM       HCG (If Applicable):   Lab Results   Component Value Date    PREGTESTUR Negative 09/23/2022        ABGs: No results found for: PHART, PO2ART, BYY4DGY, PIL9ZTM, BEART, K8SVRXLA     Type & Screen (If Applicable):  No results found for: LABABO, LABRH    Drug/Infectious Status (If Applicable):  No results found for: HIV, HEPCAB    COVID-19 Screening (If Applicable): No results found for: COVID19        Anesthesia Evaluation  Patient summary reviewed and Nursing notes reviewed no history of anesthetic complications:   Airway: Mallampati: II  TM distance: >3 FB   Neck ROM: full  Mouth opening: > = 3 FB   Dental: normal exam         Pulmonary:Negative Pulmonary ROS                              Cardiovascular:    (+) hypertension:, hyperlipidemia    (-) CABG/stent, dysrhythmias and  angina                Neuro/Psych:      (-) seizures, TIA and CVA           GI/Hepatic/Renal:   (+) GERD (no symptoms today):,           Endo/Other:    (+) hypothyroidism::., .                 Abdominal:   (+) obese,           Vascular:     - DVT and PE. Other Findings:           Anesthesia Plan      MAC     ASA 3       Induction: intravenous. Anesthetic plan and risks discussed with patient. Plan discussed with CRNA.                     Nubia Everett MD   9/23/2022

## 2022-09-23 NOTE — PROGRESS NOTES
Discharge instructions review with patient and . All home medications have been reviewed, pt v/u. Discharge instructions signed. Pt discharged via wheelchair. Pt discharged with  all belongings.  taking stable pt home.

## 2022-09-23 NOTE — H&P
Department of [address] [place] [address] Physicians   [employer]  Attending Pre-operative History and Physical      DIAGNOSIS:  Obesity    INDICATION:  Pre-op    PROCEDURE:  EGD    CHIEF COMPLAINT:  Obesity    History Obtained From:  patient    HISTORY OF PRESENT ILLNESS:    The patient is a 48 y.o. female with significant past medical history of   Patient Active Problem List   Diagnosis    Essential hypertension    Obesity    Hypothyroid    Right knee pain    Patellofemoral arthrosis    Localized osteoarthrosis, lower leg    Family history of malignant neoplasm of digestive organ    Disease of both eyes characterized by increased eye pressure    Morbid obesity with BMI of 40.0-44.9, adult ([address]    Prediabetes    Chronic GERD    Mixed hyperlipidemia      who presents for pre-op EGD    Past Medical History:        Diagnosis Date    Chronic GERD 7/28/2022    Disease of both eyes characterized by increased eye pressure 4/23/2018    Glaucoma     Have a Cupped Optic Nerve. Ask me about. Hypertension     Localized osteoarthrosis, lower leg 2/17/2016    Mixed hyperlipidemia 8/18/2022    Obesity     Thyroid disease      Past Surgical History:        Procedure Laterality Date    BREAST BIOPSY      COLONOSCOPY  2006    Ask me about. Medications Prior to Admission:   Medications Prior to Admission: spironolactone (ALDACTONE) 25 MG tablet, TAKE 1 TABLET DAILY  vitamin D (CHOLECALCIFEROL) 49798 UNIT CAPS, Take 1 capsule by mouth once a week  folic acid (V-R FOLIC ACID) 750 MCG tablet, Take 1 tablet by mouth daily  [DISCONTINUED] Cholecalciferol 50 MCG (2000 UT) TABS, Take 1 tablet by mouth daily Take 1 tablet by mouth daily.  Start this medication after you finish the weekly regimen  metoprolol succinate (TOPROL XL) 50 MG extended release tablet, TAKE 1 TABLET NIGHTLY  lisinopril (PRINIVIL;ZESTRIL) 20 MG tablet, TAKE 1 TABLET NIGHTLY  levothyroxine (SYNTHROID) 100 MCG tablet, lacerations. No drainage, swelling . Left Ear: External ear normal. No lacerations. No drainage, swelling. Nose: Nose normal. No nose lacerations or nasal deformity. Eyes: Conjunctivae, EOM and lids are normal. Right eye exhibits no discharge. No foreign body present in the right eye. Left eye exhibits no discharge. No foreign body present in the left eye. No scleral icterus. Neck: Trachea normal and normal range of motion. No JVD present. Pulmonary/Chest: Effort normal. No accessory muscle usage or stridor. No apnea. No respiratory distress. Cardiovascular: Normal rate and no JVD. Abdominal: Normal appearance. Patient exhibits no distension. Musculoskeletal: Normal range of motion. Patient exhibits no edema. Neurological: Patient is alert and oriented to person, place, and time. Patient has normal strength. GCS eye subscore is 4. GCS verbal subscore is 5. GCS motor subscore is 6. Skin: Skin is warm and dry. No abrasion and no rash noted. Patient is not diaphoretic. No cyanosis or erythema. Psychiatric: Patient has a normal mood and affect.  Speech is normal and behavior is normal. Cognition and memory are normal.       DATA:  CBC:   Lab Results   Component Value Date/Time    WBC 6.7 08/08/2022 01:53 PM    RBC 5.31 08/08/2022 01:53 PM    HGB 13.8 08/08/2022 01:53 PM    HCT 41.5 08/08/2022 01:53 PM    MCV 78.1 08/08/2022 01:53 PM    MCH 26.1 08/08/2022 01:53 PM    MCHC 33.4 08/08/2022 01:53 PM    RDW 13.3 08/08/2022 01:53 PM     08/08/2022 01:53 PM    MPV 8.0 08/08/2022 01:53 PM     CMP:    Lab Results   Component Value Date/Time     08/08/2022 01:53 PM    K 4.5 08/08/2022 01:53 PM    CL 99 08/08/2022 01:53 PM    CO2 26 08/08/2022 01:53 PM    BUN 12 08/08/2022 01:53 PM    CREATININE 0.7 08/08/2022 01:53 PM    GFRAA >60 08/08/2022 01:53 PM    GFRAA >60 03/05/2013 08:12 AM    AGRATIO 1.8 08/08/2022 01:53 PM    LABGLOM >60 08/08/2022 01:53 PM    GLUCOSE 91 08/08/2022 01:53 PM    PROT 7.2 08/08/2022 01:53 PM    PROT 7.0 03/05/2013 08:12 AM    LABALBU 4.6 08/08/2022 01:53 PM    CALCIUM 10.0 08/08/2022 01:53 PM    BILITOT 0.5 08/08/2022 01:53 PM    ALKPHOS 102 08/08/2022 01:53 PM    AST 14 08/08/2022 01:53 PM    ALT 15 08/08/2022 01:53 PM       ASSESSMENT AND PLAN:      Obesity: Body mass index is 41.63 kg/m². [x] Continue to make dietary and lifestyle modifications. [x] Plan for Future laparoscopic sleeve gastrectomy. [x] Return for follow-up. Chronic GERD:   [x] Continue to make dietary and lifestyle modifications. [] Continue Omeprazole. [] Continue Famotidine. [x] Plan for EGD to evaluate the stomach. I did explain thoroughly to the patient that compliance with pre- and post op diet and other recommendations are integral part to improve the chances of successful weight loss and also not following it could end with serious health complications. Some strategies discussed today include but not limited to : 30/30/30 minutes rule, food diary, avoid fast food and packing/planing ahead, & increasing exercise. 1.  Patient is a 48 y.o. female with above specified procedure planned EGD with deep sedation  2. Procedure options, risks and benefits reviewed with patient. Patient expresses understanding.       Electronically signed by Mary Wheatley MD , FACS, FASMBS  on 9/23/2022 at 10:42 AM

## 2022-09-23 NOTE — ANESTHESIA POSTPROCEDURE EVALUATION
Department of Anesthesiology  Postprocedure Note    Patient: Ellen Reddy  MRN: [id number]  YOB: 1975  Date of evaluation: 9/23/2022      Procedure Summary     Date: 09/23/22 Room / Location: [address] [place] [address]    Anesthesia Start: 1117 Anesthesia Stop: 1134    Procedure: EGD BIOPSY (Abdomen) Diagnosis:       Gastroesophageal reflux disease, unspecified whether esophagitis present      (Gastroesophageal reflux disease, unspecified whether esophagitis present [K21.9])    Surgeons: Flavia Gonzalez MD Responsible Provider: Albino Dawson MD    Anesthesia Type: MAC ASA Status: 3          Anesthesia Type: No value filed. Anel Phase I: Anel Score: 10    Anel Phase II: Anel Score: 10      Anesthesia Post Evaluation    Patient location during evaluation: PACU  Patient participation: complete - patient participated  Level of consciousness: awake  Airway patency: patent  Nausea & Vomiting: no vomiting  Complications: no  Cardiovascular status: hemodynamically stable  Respiratory status: acceptable  Hydration status: euvolemic  There was medical reason for not using a multimodal analgesia pain management approach.

## 2022-09-23 NOTE — DISCHARGE INSTRUCTIONS
ENDOSCOPY DISCHARGE INSTRUCTIONS    You may experience some lightheadedness for the next several hours. Plan on quiet relaxation for the rest of today. A responsible adult needs to stay with you today. Because of the medications you received today-do not drive,operate machinery,or sign any contractual agreement for the next 24 hours. Do not drink any alcoholic beverages or take any unprescribed medications tonight. Eat bland food and avoid anything greasy or spicy initially-progress to your normal diet gradually. Diet restrictions as instructed. If you have any of the following problems, notify your physician or return to the hospital emergency room : fever, chills, excessive bleeding, excessive vomiting, difficulty swallowing, uncontrolled pain, increased abdominal distention, shortness of breath or any other problems. If you have a sore throat, you may use lozenges or salt water gargles. Please call Dr. Mann Reanna office in 5 business days for biopsy results [phone]. ANESTHESIA DISCHARGE INSTRUCTIONS    Wear your seatbelt home. You are under the influence of drugs-do not drink alcohol,drive,operate machinery,or make any important decisions or sign any legal documentsfor 24 hours  Children should not ride [employer] or play on gym sets  for 24 hours after surgery. A responsible adult needs to be with you for 24 hours. You may experience lightheadedness,dizziness,or sleepiness following surgery. Rest at home today- increase activity as tolerated. Progress slowly to a regular diet unless your physician has instructed you otherwise. Drink plenty of water. If nausea becomes a problem call your physician. Coughing,sore throat,and muscle aches are other side effects of anesthesia,and should improve with time. Do not drive,operate machinery while taking narcotics.

## 2022-09-23 NOTE — PROGRESS NOTES
[address] [facility]   Cardiovascular Evaluation    PATIENT: Clement Pascal  DATE: 2022  MRN: [id number]  CSN: [id number]  : 1975      Primary Care Doctor: Della Reyes MD  Reason for evaluation:   Pre-operative cardiac evaluation    Subjective:   History of present illness on initial date of evaluation:  Clement Pascal is a 48 y.o. female history of essential hypertension, hyperlipidemia, hypothyroidism, GERD, and morbid obesity referred for a pre-operative cardiac risk assessment prior to a planned laparoscopic sleeve gastrectomy with Dr. Hernan Mullins. The patient reports that she has been trying to lose weight for several years. She has been following a low fat diet and monitoring her caloric intake. She walks 30-60 minutes every day which she says she tolerates without issue. She says that she only gets mildly short of breath with heavy exertion. She denies any chest pain, lightheadedness, dizziness, or palpitations. She says that she had some ankle swelling in the past that she believes was due to one of the medications that she was taking at the time and that this resolved after the medication was discontinued. She denies any tobacco use or history of heavy alcohol use. She occasionally uses marijuana. Family history is notable for CAD in her father who had a 4-vessel CABG. Other members of her family on her father's side have had heart disease as well. 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 Surgical History:   has a past surgical history that includes Breast biopsy; Colonoscopy (); and Upper gastrointestinal endoscopy (N/A, 2022). Social History:   reports that she has never smoked.  She has never used smokeless tobacco. She reports that she does not currently use alcohol after a past usage of about 21.0 standard drinks per week. She reports that she does not use drugs. Family History:  Family History   Problem Relation Age of Onset    Arthritis Mother         Rheumatoid    Cancer Mother         Colon Cancer    Heart Disease Father         Quad Bypass    High Blood Pressure Father     Brain Cancer Sister 61    Heart Disease Maternal Grandmother 52    Heart Disease Maternal Grandfather     Emphysema Paternal Grandmother     Heart Disease Paternal Grandfather     Cancer Sister         Brain Tumor/ Cancer    Early Death Sister         Age 35       Home Medications:  Reviewed and are listed in nursing record. and/or listed below  Current Outpatient Medications   Medication Sig Dispense Refill    omeprazole (PRILOSEC) 20 MG delayed release capsule Take 1 capsule by mouth every morning (before breakfast) 90 capsule 1    spironolactone (ALDACTONE) 25 MG tablet TAKE 1 TABLET DAILY 90 tablet 1    vitamin D (CHOLECALCIFEROL) 20903 UNIT CAPS Take 1 capsule by mouth once a week 12 capsule 0    folic acid (V-R FOLIC ACID) 954 MCG tablet Take 1 tablet by mouth daily 30 tablet 3    metoprolol succinate (TOPROL XL) 50 MG extended release tablet TAKE 1 TABLET NIGHTLY 90 tablet 1    lisinopril (PRINIVIL;ZESTRIL) 20 MG tablet TAKE 1 TABLET NIGHTLY 90 tablet 3    levothyroxine (SYNTHROID) 100 MCG tablet TAKE 1 TABLET DAILY 90 tablet 3    Magnesium Oxide 400 MG CAPS Take 1 capsule by mouth 2 times daily (Patient not taking: Reported on 9/28/2022) 60 capsule 1     No current facility-administered medications for this visit. Allergies:  Nickel and Codeine     Review of Systems:   Review of Systems   Constitutional:  Negative for chills and fever. HENT:  Negative for congestion, rhinorrhea and sore throat. Eyes:  Negative for photophobia, pain and visual disturbance. Respiratory:  Negative for cough and shortness of breath.     Cardiovascular:  Negative for chest pain, palpitations and leg swelling. Gastrointestinal:  Negative for abdominal pain, blood in stool, constipation, diarrhea, nausea and vomiting. Endocrine: Negative for cold intolerance and heat intolerance. Genitourinary:  Negative for difficulty urinating, dysuria and hematuria. Musculoskeletal:  Negative for arthralgias, joint swelling and myalgias. Skin:  Negative for rash and wound. Allergic/Immunologic: Negative for environmental allergies and food allergies. Neurological:  Negative for dizziness, syncope and light-headedness. Hematological:  Negative for adenopathy. Does not bruise/bleed easily. Psychiatric/Behavioral:  Negative for dysphoric mood. The patient is not nervous/anxious. Objective:   PHYSICAL EXAM:    Vitals:    09/28/22 0946   BP: 126/76   Pulse: 72   SpO2: 100%    Weight: 244 lb (110.7 kg)     Wt Readings from Last 3 Encounters:   09/28/22 244 lb (110.7 kg)   09/23/22 242 lb 8 oz (110 kg)   09/22/22 245 lb (111.1 kg)       Vitals:    09/28/22 0946   BP: 126/76   Pulse: 72   SpO2: 100%   Weight: 244 lb (110.7 kg)   Height: 5' 4\" (1.626 m)     General: Morbidly obese adult female in no acute distress. Pleasant and interactive on exam.  HEENT: Normocephalic, atraumatic, non-icteric, hearing intact, nares normal, mucous membranes moist.  Neck: Supple, trachea midline. No adenopathy. No thyromegaly. No carotid bruits. No JVD. Heart: Regular rate and rhythm. Normal S1 and S2. No murmurs, gallops or rubs. Lungs: Normal respiratory effort. Clear to auscultation bilaterally. No wheezes, rales, or rhonchi. Abdomen: Soft, non-tender. Normoactive bowel sounds. No masses or organomegaly. Skin: No rashes, wounds, or lesions. Pulses: 2+ and symmetric. Extremities: No clubbing, cyanosis, or edema. Musculoskeletal: 5/5 strength in upper and lower extremities. Psych: Normal mood and affect. Neuro: Alert and oriented to person, place, and time. No focal deficits noted.       LABS CBC:      Lab Results   Component Value Date/Time    WBC 6.7 08/08/2022 01:53 PM    RBC 5.31 08/08/2022 01:53 PM    HGB 13.8 08/08/2022 01:53 PM    HCT 41.5 08/08/2022 01:53 PM    MCV 78.1 08/08/2022 01:53 PM    RDW 13.3 08/08/2022 01:53 PM     08/08/2022 01:53 PM     CMP:  Lab Results   Component Value Date/Time     08/08/2022 01:53 PM    K 4.5 08/08/2022 01:53 PM    CL 99 08/08/2022 01:53 PM    CO2 26 08/08/2022 01:53 PM    BUN 12 08/08/2022 01:53 PM    CREATININE 0.7 08/08/2022 01:53 PM    GFRAA >60 08/08/2022 01:53 PM    GFRAA >60 03/05/2013 08:12 AM    AGRATIO 1.8 08/08/2022 01:53 PM    LABGLOM >60 08/08/2022 01:53 PM    GLUCOSE 91 08/08/2022 01:53 PM    PROT 7.2 08/08/2022 01:53 PM    PROT 7.0 03/05/2013 08:12 AM    CALCIUM 10.0 08/08/2022 01:53 PM    BILITOT 0.5 08/08/2022 01:53 PM    ALKPHOS 102 08/08/2022 01:53 PM    AST 14 08/08/2022 01:53 PM    ALT 15 08/08/2022 01:53 PM     PT/INR:   No results found for: PTINR  Liver:  No components found for: CHLPL  Lab Results   Component Value Date    ALT 15 08/08/2022    AST 14 (L) 08/08/2022    ALKPHOS 102 08/08/2022    BILITOT 0.5 08/08/2022     Lab Results   Component Value Date    LABA1C 5.8 08/08/2022     Lipids:         Lab Results   Component Value Date    TRIG 91 08/08/2022    TRIG 93 04/18/2022    TRIG 95 06/28/2021            Lab Results   Component Value Date    HDL 60 08/08/2022    HDL 64 (H) 04/18/2022    HDL 54 06/28/2021            Lab Results   Component Value Date    LDLCALC 132 (H) 08/08/2022    LDLCALC 143 (H) 04/18/2022    LDLCALC 142 (H) 06/28/2021            Lab Results   Component Value Date    LABVLDL 18 08/08/2022    LABVLDL 19 04/18/2022    LABVLDL 19 06/28/2021         CARDIAC DATA   LAST EKG:  Normal sinus rhythm, possible anteroseptal infarct pattern, low voltage in precordial leads    ECHO: N/A    STRESS TEST: N/A    CARDIAC CATH: N/A    VASCULAR/OTHER IMAGING:      Assessment:     1.  Pre-operative cardiac risk assessment  2. Morbid obesity  3. Essential hypertension  4. Hyperlipidemia  5. Hypothyroidism  6. GERD  7. Family history of CAD    Plan:     1. Patient denies angina and is able to perform >4 METs. Her EKG shows a possible anteroseptal infarct pattern, which could potentially be a normal variant and/or related lead placement, but given her family history of CAD, would recommend obtaining a TTE for complete assessment of cardiac structure and function as well as a CT coronary calcium scoring study for further cardiac risk stratification. If the aforementioned testing does not reveal any high-risk findings, patient would be considered acceptable cardiac risk for proceeding with the planned laparoscopic sleeve gastrectomy. 2. Her cholesterol levels are mildly elevated, but it is reasonable to first attempt to achieve better lipid control through lifestyle modifications before initiating medical therapy in absence of any definitive known vascular disease. If coronary CT calcium scoring study does demonstrate evidence of CAD, would plan to initiate more aggressive treatment. 3. Encouraged patient to continue efforts at pre-operative weight loss and to adhere to diet prescribed by bariatric surgery clinic. 4. Recommended regular physical exercise for at least 30 minutes 3-5 times per week. 5. Follow-up with me to be arranged pending results of above testing. Scribe's attestation: This note was scribed in the presence of Sriram Christianson DO by Marli Arenas RN       It is a pleasure to assist in the care of Kristina Moses Brady. Please call with any questions. The scribes documentation has been prepared under my direction and personally reviewed by me in its entirety. I confirm that the note above accurately reflects all work, treatment, procedures, and medical decision making performed by me.   I, Dr. Jenny Padilla, personally performed the services described in this documentation as scribed by Marli Arenas RN in my presence, and it is both accurate and complete to the best of our ability.        Rick Lewis, [address] [facility]  [phone] [facility]  [phone] [address] [place] [address]

## 2022-09-28 ENCOUNTER — OFFICE VISIT (OUTPATIENT)
Dept: CARDIOLOGY CLINIC | Age: 47
End: 2022-09-28
Payer: COMMERCIAL

## 2022-09-28 VITALS
SYSTOLIC BLOOD PRESSURE: 126 MMHG | DIASTOLIC BLOOD PRESSURE: 76 MMHG | BODY MASS INDEX: 41.66 KG/M2 | OXYGEN SATURATION: 100 % | WEIGHT: 244 LBS | HEART RATE: 72 BPM | HEIGHT: 64 IN

## 2022-09-28 DIAGNOSIS — E03.9 HYPOTHYROIDISM, UNSPECIFIED TYPE: ICD-10-CM

## 2022-09-28 DIAGNOSIS — K21.9 GASTROESOPHAGEAL REFLUX DISEASE WITHOUT ESOPHAGITIS: ICD-10-CM

## 2022-09-28 DIAGNOSIS — I10 ESSENTIAL HYPERTENSION: Primary | ICD-10-CM

## 2022-09-28 DIAGNOSIS — E78.5 HYPERLIPIDEMIA, UNSPECIFIED HYPERLIPIDEMIA TYPE: ICD-10-CM

## 2022-09-28 DIAGNOSIS — E66.01 MORBID OBESITY (HCC): ICD-10-CM

## 2022-09-28 DIAGNOSIS — Z82.49 FAMILY HISTORY OF HEART DISEASE: ICD-10-CM

## 2022-09-28 DIAGNOSIS — Z01.818 PRE-OP EVALUATION: ICD-10-CM

## 2022-09-28 PROCEDURE — 93000 ELECTROCARDIOGRAM COMPLETE: CPT | Performed by: INTERNAL MEDICINE

## 2022-09-28 PROCEDURE — 99204 OFFICE O/P NEW MOD 45 MIN: CPT | Performed by: INTERNAL MEDICINE

## 2022-09-28 NOTE — PATIENT INSTRUCTIONS
Patient Plan:  1. Recommend Echo to further assess heart structure and function   -you will call to schedule this   2. Recommend CT Calcium score   -you will call to schedule this  3. Pending results of testing will give cardiac clearance/schedule appointment as needed       Your provider has ordered testing for further evaluation. An order/prescription has been included in your paper work. To schedule outpatient testing, contact Central Scheduling by calling [address] [place] [address] ([phone]).

## 2022-09-28 NOTE — LETTER
[address] Cardiology - [address] [place] [address] [facility]  Phone: [phone]  Fax: [phone]    DO Claudia Kruse   78[birth year]4178  [address]  [place] [address]        September 28, 2022      To whom it may concern,     Overall, she is low risk for an adverse perioperative cardiovascular event in the setting of an intermediate risk surgery. Please call our office with further questions.         Sincerely,        Mendez Christianson, DO

## 2022-09-28 NOTE — LETTER
Samantha Guerrero        [address]   Cardiovascular Evaluation    PATIENT: Samantha Guerrero  DATE: 2022  MRN: [id number]  [facility]: [id number]  : 1975      Primary Care Doctor: Zoë Zarco MD  Reason for evaluation:   Pre-operative cardiac evaluation    Subjective:   History of present illness on initial date of evaluation:  Samantha Guerrero is a 48 y.o. female history of essential hypertension, hyperlipidemia, hypothyroidism, GERD, and morbid obesity referred for a pre-operative cardiac risk assessment prior to a planned laparoscopic sleeve gastrectomy with Dr. Owen Sharif. The patient reports that she has been trying to lose weight for several years. She has been following a low fat diet and monitoring her caloric intake. She walks 30-60 minutes every day which she says she tolerates without issue. She says that she only gets mildly short of breath with heavy exertion. She denies any chest pain, lightheadedness, dizziness, or palpitations. She says that she had some ankle swelling in the past that she believes was due to one of the medications that she was taking at the time and that this resolved after the medication was discontinued. She denies any tobacco use or history of heavy alcohol use. She occasionally uses marijuana. Family history is notable for CAD in her father who had a 4-vessel CABG. Other members of her family on her father's side have had heart disease as well. 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 Surgical History:   has a past surgical history that includes Breast biopsy; Colonoscopy (); and Upper gastrointestinal endoscopy (N/A, 2022). Social History:   reports that she has never smoked.  She has never used smokeless tobacco. She reports that she does not currently use alcohol after a past usage of about 21.0 standard drinks per week. She reports that she does not use drugs. Family History:  Family History   Problem Relation Age of Onset    Arthritis Mother         Rheumatoid    Cancer Mother         Colon Cancer    Heart Disease Father         Quad Bypass    High Blood Pressure Father     Brain Cancer Sister 61    Heart Disease Maternal Grandmother 52    Heart Disease Maternal Grandfather     Emphysema Paternal Grandmother     Heart Disease Paternal Grandfather     Cancer Sister         Brain Tumor/ Cancer    Early Death Sister         Age 35       Home Medications:  Reviewed and are listed in nursing record. and/or listed below  Current Outpatient Medications   Medication Sig Dispense Refill    omeprazole (PRILOSEC) 20 MG delayed release capsule Take 1 capsule by mouth every morning (before breakfast) 90 capsule 1    spironolactone (ALDACTONE) 25 MG tablet TAKE 1 TABLET DAILY 90 tablet 1    vitamin D (CHOLECALCIFEROL) 76146 UNIT CAPS Take 1 capsule by mouth once a week 12 capsule 0    folic acid (V-R FOLIC ACID) 393 MCG tablet Take 1 tablet by mouth daily 30 tablet 3    metoprolol succinate (TOPROL XL) 50 MG extended release tablet TAKE 1 TABLET NIGHTLY 90 tablet 1    lisinopril (PRINIVIL;ZESTRIL) 20 MG tablet TAKE 1 TABLET NIGHTLY 90 tablet 3    levothyroxine (SYNTHROID) 100 MCG tablet TAKE 1 TABLET DAILY 90 tablet 3    Magnesium Oxide 400 MG CAPS Take 1 capsule by mouth 2 times daily (Patient not taking: Reported on 9/28/2022) 60 capsule 1     No current facility-administered medications for this visit. Allergies:  Nickel and Codeine     Review of Systems:   Review of Systems   Constitutional:  Negative for chills and fever. HENT:  Negative for congestion, rhinorrhea and sore throat. Eyes:  Negative for photophobia, pain and visual disturbance.    Respiratory:  Negative for cough and shortness of breath. Cardiovascular:  Negative for chest pain, palpitations and leg swelling. Gastrointestinal:  Negative for abdominal pain, blood in stool, constipation, diarrhea, nausea and vomiting. Endocrine: Negative for cold intolerance and heat intolerance. Genitourinary:  Negative for difficulty urinating, dysuria and hematuria. Musculoskeletal:  Negative for arthralgias, joint swelling and myalgias. Skin:  Negative for rash and wound. Allergic/Immunologic: Negative for environmental allergies and food allergies. Neurological:  Negative for dizziness, syncope and light-headedness. Hematological:  Negative for adenopathy. Does not bruise/bleed easily. Psychiatric/Behavioral:  Negative for dysphoric mood. The patient is not nervous/anxious. Objective:   PHYSICAL EXAM:    Vitals:    09/28/22 0946   BP: 126/76   Pulse: 72   SpO2: 100%    Weight: 244 lb (110.7 kg)     Wt Readings from Last 3 Encounters:   09/28/22 244 lb (110.7 kg)   09/23/22 242 lb 8 oz (110 kg)   09/22/22 245 lb (111.1 kg)       Vitals:    09/28/22 0946   BP: 126/76   Pulse: 72   SpO2: 100%   Weight: 244 lb (110.7 kg)   Height: 5' 4\" (1.626 m)     General: Morbidly obese adult female in no acute distress. Pleasant and interactive on exam.  HEENT: Normocephalic, atraumatic, non-icteric, hearing intact, nares normal, mucous membranes moist.  Neck: Supple, trachea midline. No adenopathy. No thyromegaly. No carotid bruits. No JVD. Heart: Regular rate and rhythm. Normal S1 and S2. No murmurs, gallops or rubs. Lungs: Normal respiratory effort. Clear to auscultation bilaterally. No wheezes, rales, or rhonchi. Abdomen: Soft, non-tender. Normoactive bowel sounds. No masses or organomegaly. Skin: No rashes, wounds, or lesions. Pulses: 2+ and symmetric. Extremities: No clubbing, cyanosis, or edema. Musculoskeletal: 5/5 strength in upper and lower extremities. Psych: Normal mood and affect.   Neuro: Alert and oriented to person, place, and time. No focal deficits noted.       LABS   CBC:      Lab Results   Component Value Date/Time    WBC 6.7 08/08/2022 01:53 PM    RBC 5.31 08/08/2022 01:53 PM    HGB 13.8 08/08/2022 01:53 PM    HCT 41.5 08/08/2022 01:53 PM    MCV 78.1 08/08/2022 01:53 PM    RDW 13.3 08/08/2022 01:53 PM     08/08/2022 01:53 PM     CMP:  Lab Results   Component Value Date/Time     08/08/2022 01:53 PM    K 4.5 08/08/2022 01:53 PM    CL 99 08/08/2022 01:53 PM    CO2 26 08/08/2022 01:53 PM    BUN 12 08/08/2022 01:53 PM    CREATININE 0.7 08/08/2022 01:53 PM    GFRAA >60 08/08/2022 01:53 PM    GFRAA >60 03/05/2013 08:12 AM    AGRATIO 1.8 08/08/2022 01:53 PM    LABGLOM >60 08/08/2022 01:53 PM    GLUCOSE 91 08/08/2022 01:53 PM    PROT 7.2 08/08/2022 01:53 PM    PROT 7.0 03/05/2013 08:12 AM    CALCIUM 10.0 08/08/2022 01:53 PM    BILITOT 0.5 08/08/2022 01:53 PM    ALKPHOS 102 08/08/2022 01:53 PM    AST 14 08/08/2022 01:53 PM    ALT 15 08/08/2022 01:53 PM     PT/INR:   No results found for: PTINR  Liver:  No components found for: CHLPL  Lab Results   Component Value Date    ALT 15 08/08/2022    AST 14 (L) 08/08/2022    ALKPHOS 102 08/08/2022    BILITOT 0.5 08/08/2022     Lab Results   Component Value Date    LABA1C 5.8 08/08/2022     Lipids:         Lab Results   Component Value Date    TRIG 91 08/08/2022    TRIG 93 04/18/2022    TRIG 95 06/28/2021            Lab Results   Component Value Date    HDL 60 08/08/2022    HDL 64 (H) 04/18/2022    HDL 54 06/28/2021            Lab Results   Component Value Date    LDLCALC 132 (H) 08/08/2022    LDLCALC 143 (H) 04/18/2022    LDLCALC 142 (H) 06/28/2021            Lab Results   Component Value Date    LABVLDL 18 08/08/2022    LABVLDL 19 04/18/2022    LABVLDL 19 06/28/2021         CARDIAC DATA   LAST EKG:  Normal sinus rhythm, possible anteroseptal infarct pattern, low voltage in precordial leads    ECHO: N/A    STRESS TEST: N/A    CARDIAC CATH: N/A    VASCULAR/OTHER IMAGING:      Assessment:     1. Pre-operative cardiac risk assessment  2. Morbid obesity  3. Essential hypertension  4. Hyperlipidemia  5. Hypothyroidism  6. GERD  7. Family history of CAD    Plan:     1. Patient denies angina and is able to perform >4 METs. Her EKG shows a possible anteroseptal infarct pattern, which could potentially be a normal variant and/or related lead placement, but given her family history of CAD, would recommend obtaining a TTE for complete assessment of cardiac structure and function as well as a CT coronary calcium scoring study for further cardiac risk stratification. If the aforementioned testing does not reveal any high-risk findings, patient would be considered acceptable cardiac risk for proceeding with the planned laparoscopic sleeve gastrectomy. 2. Her cholesterol levels are mildly elevated, but it is reasonable to first attempt to achieve better lipid control through lifestyle modifications before initiating medical therapy in absence of any definitive known vascular disease. If coronary CT calcium scoring study does demonstrate evidence of CAD, would plan to initiate more aggressive treatment. 3. Encouraged patient to continue efforts at pre-operative weight loss and to adhere to diet prescribed by bariatric surgery clinic. 4. Recommended regular physical exercise for at least 30 minutes 3-5 times per week. 5. Follow-up with me to be arranged pending results of above testing. Scribe's attestation: This note was scribed in the presence of Sriram Christianson DO by Ana Maria Blanco RN       It is a pleasure to assist in the care of Taz Ana Paula Caitlyn. Please call with any questions. The scribes documentation has been prepared under my direction and personally reviewed by me in its entirety. I confirm that the note above accurately reflects all work, treatment, procedures, and medical decision making performed by me.   I, Dr. Karla Seaman, personally performed the services described in this documentation as scribed by Jluis Marsh RN in my presence, and it is both accurate and complete to the best of our ability.        Pablo Castano, [address] [facility]  [phone] [facility]  [phone] [address] [place] [address]

## 2022-10-02 ASSESSMENT — ENCOUNTER SYMPTOMS
EYE PAIN: 0
SHORTNESS OF BREATH: 0
VOMITING: 0
COUGH: 0
CONSTIPATION: 0
SORE THROAT: 0
NAUSEA: 0
BLOOD IN STOOL: 0
RHINORRHEA: 0
ABDOMINAL PAIN: 0
PHOTOPHOBIA: 0
DIARRHEA: 0

## 2022-10-11 ENCOUNTER — INITIAL CONSULT (OUTPATIENT)
Dept: BARIATRICS/WEIGHT MGMT | Age: 47
End: 2022-10-11
Payer: COMMERCIAL

## 2022-10-11 DIAGNOSIS — F43.22 ADJUSTMENT REACTION WITH ANXIETY: Primary | ICD-10-CM

## 2022-10-11 DIAGNOSIS — E66.01 MORBID OBESITY WITH BMI OF 40.0-44.9, ADULT (HCC): ICD-10-CM

## 2022-10-11 PROCEDURE — 96130 PSYCL TST EVAL PHYS/QHP 1ST: CPT | Performed by: PSYCHOLOGIST

## 2022-10-11 PROCEDURE — 90791 PSYCH DIAGNOSTIC EVALUATION: CPT | Performed by: PSYCHOLOGIST

## 2022-10-11 PROCEDURE — 96136 PSYCL/NRPSYC TST PHY/QHP 1ST: CPT | Performed by: PSYCHOLOGIST

## 2022-10-11 NOTE — PROGRESS NOTES
Constance Garcia presented for her presurgical psychological evaluation on 10/11/2022. The evaluation consisted of a clinical interview, the Eating Habits Checklist, the Binge Eating Disorder Screener - 7 (BEDS-7), and the [facility] ([facility]) administered by the provider. Based on the evaluation, Constance Garcia is considered to be an appropriate candidate for bariatric surgery from a psychological standpoint, provided she demonstrates the ability to effectively implement and sustain presurgical dietary recommendations. She exhibits mild anxiety secondary to her health and impending surgery, but otherwise reports no significant history of mental health concerns. She reports no history of psychological intervention, including psychotropic medications. She denies a significant history of depression, including suicidal ideation or suicide attempts, and has never been hospitalized psychiatrically. She denies a history of chemical abuse or dependence. She is a non-smoker. She reports drinking alcohol socially in the past, but states she discontinued her alcohol use one year ago. She reports smoking cannabis socially once per month or less, but denies any other recreational or illicit drug use. She denies a history of trauma. Constance Garcia has never been diagnosed with an eating disorder, and her responses in the interview and on the Eating Habits Checklist and the BEDS-7 do not warrant a clinical diagnosis. She does, however, acknowledge eating in response to emotional stress and boredom. She reports a tendency to skip breakfast, and endorsed grazing throughout the evening after dinner. She verbalized understanding that she will need to eat small, frequent meals and snacks more consistently and with intention throughout her day post-surgically. She indicated she has reduced her consumption of fast food/pizza. She is using smaller plates at home for portion control.  She has eliminated carbonated beverages from her diet, and is weaning herself from caffeinated coffee. She is actively working to increase her daily water intake. She endorsed self-punitive thoughts and feelings related to her weight and/or eating behaviors that appear to heighten her preoccupation with food/eating. She reports binge eating on occasion when she was young, but denies any recent binge eating, or any history of purging behavior. Kamille Olguin maintains a high level of functional activity working as a  for [employer], with whom she has been employed for over a year. She currently resides with her  of 12 years. Kamille Olguin presents as well-dressed and neatly groomed. She ambulated with no visible gait disturbance and with no assistive devices. She easily established rapport, and was open and forthcoming in her responses. Affect was slightly anxious and tense. Mood was reported as stable and euthymic. Speech was articulate and within normal limits for rate and volume. There were no obvious cognitive deficits, nor gross impairment in attention or memory. She was oriented to person, place, and time. Thought processes were logical and coherent with no signs of psychosis. Insight and judgment were estimated to be intact. Kamille Olguin completed the MBMD as part of the evaluation. Her profile is valid. Results indicate eating and inactivity as possible problem areas at this time. There is no indication of acute psychiatric distress, including anxiety, depression, emotional lability, or cognitive dysfunction. Her profile is characterized by agreeableness and social conformity. She may go out of her way to appear self-disciplined, responsible, and serious in order to elicit favorable attention and approval, particularly from those in authority. She is likely to deny personal problems or emotional distress in order to maintain appearances.  When she fails to maintain a good encouraged to participate in support group activities through [facility], and to consult with our staff should she experience any significant post-surgical changes or have difficulty modifying her eating behaviors. Feel free to consult with me as needed with any further questions regarding this evaluation. Provider spent 38 minutes in test administration services. Provider spent 70 minutes in test evaluation services on 10/11/2022.

## 2022-10-12 ENCOUNTER — HOSPITAL ENCOUNTER (OUTPATIENT)
Dept: CT IMAGING | Age: 47
Discharge: HOME | End: 2022-10-12
Payer: COMMERCIAL

## 2022-10-12 ENCOUNTER — HOSPITAL ENCOUNTER (OUTPATIENT)
Dept: CARDIOLOGY | Age: 47
Discharge: HOME | End: 2022-10-12
Payer: COMMERCIAL

## 2022-10-12 DIAGNOSIS — Z01.818 PRE-OP EVALUATION: ICD-10-CM

## 2022-10-12 DIAGNOSIS — Z82.49 FAMILY HISTORY OF HEART DISEASE: ICD-10-CM

## 2022-10-12 DIAGNOSIS — I10 ESSENTIAL HYPERTENSION: ICD-10-CM

## 2022-10-12 LAB
LV EF: 58 %
LVEF MODALITY: NORMAL

## 2022-10-12 PROCEDURE — 93306 TTE W/DOPPLER COMPLETE: CPT

## 2022-10-12 PROCEDURE — 75571 CT HRT W/O DYE W/CA TEST: CPT

## 2022-10-14 ENCOUNTER — TELEPHONE (OUTPATIENT)
Dept: CARDIOLOGY CLINIC | Age: 47
End: 2022-10-14

## 2022-10-14 NOTE — TELEPHONE ENCOUNTER
Created telephone encounter. [facility] requesting a call back to the office.  Please find out what pharmacy pt would like medication sent to?

## 2022-10-17 NOTE — TELEPHONE ENCOUNTER
10/17 pt called [facility], relayed [facility] message, pt VU and requesting meds to go to [address] [place] [address], [place] - [address] [place] [address] - P [phone] - F [phone]   [address] [place] [address] [facility] [address]   Phone:  [phone]  Fax:  [phone], please advise.

## 2022-10-18 RX ORDER — ASPIRIN 81 MG/1
81 TABLET ORAL DAILY
Qty: 90 TABLET | Refills: 1 | Status: SHIPPED | OUTPATIENT
Start: 2022-10-18

## 2022-10-18 RX ORDER — ATORVASTATIN CALCIUM 40 MG/1
40 TABLET, FILM COATED ORAL DAILY
Qty: 90 TABLET | Refills: 1 | Status: SHIPPED | OUTPATIENT
Start: 2022-10-18

## 2022-10-27 ENCOUNTER — OFFICE VISIT (OUTPATIENT)
Dept: BARIATRICS/WEIGHT MGMT | Age: 47
End: 2022-10-27
Payer: COMMERCIAL

## 2022-10-27 VITALS
SYSTOLIC BLOOD PRESSURE: 116 MMHG | HEART RATE: 70 BPM | HEIGHT: 64 IN | WEIGHT: 239 LBS | OXYGEN SATURATION: 98 % | BODY MASS INDEX: 40.8 KG/M2 | RESPIRATION RATE: 18 BRPM | DIASTOLIC BLOOD PRESSURE: 68 MMHG

## 2022-10-27 DIAGNOSIS — I10 ESSENTIAL HYPERTENSION: ICD-10-CM

## 2022-10-27 DIAGNOSIS — E66.01 MORBID OBESITY WITH BMI OF 40.0-44.9, ADULT (HCC): ICD-10-CM

## 2022-10-27 DIAGNOSIS — K21.9 CHRONIC GERD: Primary | ICD-10-CM

## 2022-10-27 DIAGNOSIS — E78.2 MIXED HYPERLIPIDEMIA: ICD-10-CM

## 2022-10-27 DIAGNOSIS — R73.03 PREDIABETES: ICD-10-CM

## 2022-10-27 PROCEDURE — 3074F SYST BP LT 130 MM HG: CPT | Performed by: SURGERY

## 2022-10-27 PROCEDURE — 99214 OFFICE O/P EST MOD 30 MIN: CPT | Performed by: SURGERY

## 2022-10-27 PROCEDURE — 3078F DIAST BP <80 MM HG: CPT | Performed by: SURGERY

## 2022-10-27 NOTE — PROGRESS NOTES
Humera Brady 6 lbs over 5 weeks. Wake up 7:30 AM  Breakfast: 9 AM: Greek yogurt   Snack: 11-12 PM: String cheese OR handful nuts    Lunch: 1-2 PM: Turkey/cheese/miracle whip sandwich on lc ww bread + carrots w/ greek yogurt ranch OR grilled chix wrap w/ fadumo/francheska/ww wrap  Snack: 4-5 PM: String cheese  Dinner: 7 PM: Lean Cuisine (Swedish meatball) + salad w/ olive oil   Snack: None    Is pt consuming smaller portions? yes using pre-portioned items, using plate method     Is pt consuming at least 64 oz of fluids per day? yes 4 bottles water/ decaf tea w/ sweet'n'low    Is pt consuming carbonated, caffeinated, or sugary beverages? No pop, decaf coffee w/ powdered creamer (10 calories, 0 fat/sugar) and sweet'n'low    Has pt sampled Unjury and/or Nectar protein? Yes likes Grape    Has patient attended a support group?  Scheduled 11/10 And over zoom + 12/6 zoom    Exercise: Walking 30-40 minutes/day steady pace     Plan/Recommendations:   - Continue current eating patterns including protein and plants with all meals and snacks  - Continue trying more flavors of protein powder  - Keep walking and increase as tolerated     Handouts: none    Cesar Conteh RD, LD

## 2022-10-27 NOTE — PATIENT INSTRUCTIONS
Patient received dietary handouts and education.     Plan/Recommendations:   - Continue current eating patterns including protein and plants with all meals and snacks  - Continue trying more flavors of protein powder  - Keep walking and increase as tolerated

## 2022-10-27 NOTE — PROGRESS NOTES
[facility]) Physicians   [employer]  David Wyman MD, [facility], [address], [address] [facility]    [place] [address] . Phone: [phone]  Fax: [phone]        HPI:     Landon Zarco is a very pleasant 48 y.o. female with Body mass index is 41.02 kg/m². , Pre-Surgery for future weight loss. Pre-operative clearance and work up done. Working hard to keep good dietary habits as well level of activity. Patient denies any nausea, vomiting, fevers, chills, shortness of breath, chest pain, cough, constipation or difficulty urinating. 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 Ask me about. Hypertension     Localized osteoarthrosis, lower leg 2/17/2016    Mixed hyperlipidemia 8/18/2022    Obesity     Thyroid disease      Past Surgical History:   Procedure Laterality Date    BREAST BIOPSY      COLONOSCOPY  2006    Ask me about.     UPPER GASTROINTESTINAL ENDOSCOPY N/A 9/23/2022    EGD BIOPSY performed by Anne Carvajal MD at [address] [place] [address]     Family History   Problem Relation Age of Onset    Arthritis Mother         Rheumatoid    Cancer Mother         Colon Cancer    Heart Disease Father         Quad Bypass    High Blood Pressure Father     Brain Cancer Sister 61    Heart Disease Maternal Grandmother 52    Heart Disease Maternal Grandfather     Emphysema Paternal Grandmother     Heart Disease Paternal Grandfather     Cancer Sister         Brain Tumor/ Cancer    Early Death Sister         Age 35     Social History     Tobacco Use    Smoking status: Never    Smokeless tobacco: Never   Substance Use Topics    Alcohol use: Not Currently     Alcohol/week: 21.0 standard drinks     Types: 15 Cans of beer, 6 Shots of liquor per week     Comment: 3 days     I counseled the patient on the importance of not smoking and risks of ETOH. Allergies   Allergen Reactions    Nickel Other (See Comments)     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    Codeine Nausea Only, Rash and Nausea And Vomiting     Vitals:    10/27/22 0906   BP: 116/68   Pulse: 70   Resp: 18   SpO2: 98%   Weight: 239 lb (108.4 kg)   Height: 5' 4\" (1.626 m)       Body mass index is 41.02 kg/m². Current Outpatient Medications:     aspirin EC 81 MG EC tablet, Take 1 tablet by mouth daily, Disp: 90 tablet, Rfl: 1    atorvastatin (LIPITOR) 40 MG tablet, Take 1 tablet by mouth daily, Disp: 90 tablet, Rfl: 1    omeprazole (PRILOSEC) 20 MG delayed release capsule, Take 1 capsule by mouth every morning (before breakfast), Disp: 90 capsule, Rfl: 1    spironolactone (ALDACTONE) 25 MG tablet, TAKE 1 TABLET DAILY, Disp: 90 tablet, Rfl: 1    vitamin D (CHOLECALCIFEROL) 83649 UNIT CAPS, Take 1 capsule by mouth once a week, Disp: 12 capsule, Rfl: 0    folic acid (V-R FOLIC ACID) 224 MCG tablet, Take 1 tablet by mouth daily, Disp: 30 tablet, Rfl: 3    metoprolol succinate (TOPROL XL) 50 MG extended release tablet, TAKE 1 TABLET NIGHTLY, Disp: 90 tablet, Rfl: 1    lisinopril (PRINIVIL;ZESTRIL) 20 MG tablet, TAKE 1 TABLET NIGHTLY, Disp: 90 tablet, Rfl: 3    levothyroxine (SYNTHROID) 100 MCG tablet, TAKE 1 TABLET DAILY, Disp: 90 tablet, Rfl: 3      Review of Systems - History obtained from the patient  General ROS: negative  Psychological ROS: negative  Ophthalmic ROS: negative  Neurological ROS: negative  ENT ROS: negative  Allergy and Immunology ROS: negative  Hematological and Lymphatic ROS: negative  Endocrine ROS: negative  Breast ROS: negative  Respiratory ROS: negative  Cardiovascular ROS: negative  Gastrointestinal ROS:negative  Genito-Urinary ROS: negative  Musculoskeletal ROS: negative   Skin ROS: negative    Physical Exam   Vitals Reviewed   Constitutional: Patient is oriented to person, place, and time.  Patient appears well-developed and well-nourished. Patient is active and cooperative. Non-toxic appearance. No distress. HENT:   Head: Normocephalic and atraumatic. Head is without abrasion and without laceration. Hair is normal.   Right Ear: External ear normal. No lacerations. No drainage, swelling . Left Ear: External ear normal. No lacerations. No drainage, swelling. Mouth/Nose: Mask in Place   Eyes: Conjunctivae, EOM and lids are normal. Right eye exhibits no discharge. No foreign body present in the right eye. Left eye exhibits no discharge. No foreign body present in the left eye. No scleral icterus. Neck: Trachea normal and normal range of motion. No JVD present. Pulmonary/Chest: Effort normal. No accessory muscle usage or stridor. No apnea. No respiratory distress. Cardiovascular: Normal rate and no JVD. Abdominal: Normal appearance. Patient exhibits no distension. Abdomen is soft, obese, non tender. Musculoskeletal: Normal range of motion. Patient exhibits no edema. Neurological: Patient is alert and oriented to person, place, and time. Patient has normal strength. GCS eye subscore is 4. GCS verbal subscore is 5. GCS motor subscore is 6. Skin: Skin is warm and dry. No abrasion and no rash noted. Patient is not diaphoretic. No cyanosis or erythema. Psychiatric: Patient has a normal mood and affect. Speech is normal and behavior is normal. Cognition and memory are normal.       Vinay Thao is 48 y.o. female, Body mass index is 41.02 kg/m². pre surgery. The patient underwent dietary counseling with registered dietician. I have reviewed, discussed and agree with the dietary plan. Patient is trying hard to keep good dietary and behavior modifications. Patient is monitoring portion sizes, food choices and liquid calories. Patient is trying to exercise regularly as much as possible.     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 I encouraged the patient to continue exercise and keeping healthy eating habits. Discussed pre-op labs and work up till now. Also counseled the patient extensively on Surgery. The encounter today, included any number of the following: Bariatric Pre/Post operative work up/protocols, review of labs, imaging, provider notes, outside hospital records, performing examination/evaluation, counseling patient and/or family, ordering medications/tests, placing referrals and communication with referring physicians, coordination of care; discussing dietary plan/recall with the patient as well with registered dietitian and documentation in the EHR. Of note, the above was done during same day of the actual patient encounter. I did explain thoroughly to the patient that compliance with pre- and post op diet and other recommendations are integral part to improve the chances of successful weight loss and also not following it could end with serious health complications. Some strategies discussed today include but not limited to : 30/30/30 minutes rule, food diary, avoid fast food and packing/planing ahead, & increasing exercise. Also stressed to the patient importance of taking the multivitamins as instructed, otherwise risk significant complications. Yanelis Bill is a 48 y.o. female with Body mass index is 41.02 kg/m². ,  patient is deemed appropriate candidate for weight loss surgery by our multidisciplinary team.       Following The Metabolic and Bariatric Surgery Accreditation and Quality Improvement Program ([address]), and [employer] (ACS) recommendations, the Bariatric Surgical Risk/Benefit Calculator was used for [facility]. Report was discussed with [place] and patient wishes to proceed with surgery, fully understanding the risks and benefits. Of note, [facility] Bariatric Surgical Risk/Benefit Calculator estimates the chance of an unfavorable outcome (such as a complication or death), the chance of remission of weight-related comorbidities, and the patient's BMI, weight change, and percent total weight change after surgery. These quantities are estimated based upon information the patient gives the healthcare provider about prior health history. The estimates are calculated using data from a large number of patients who had a primary bariatric surgical procedure similar to the one the patient may have. Please note the risk percentages, remission percentages, BMI, weight change, and percent total weight change provided to you by the risk/benefit calculator are only estimates. These estimates only take certain information into account. There may be other factors that are not included in the estimate which may increase or decrease the risk of a complication, the chance of remission of a weight-related comorbidity, or the amount of weight the patient loses. These estimates are not a guarantee of results. A complication after surgery may happen even if the risk is low, a weight-related comorbidity may not go into remission even if the chances are high, and a patient may lose more or less weight than predicted. This information is not intended to replace the advice of a doctor or healthcare provider about the diagnosis, treatment, or potential outcomes. The Provider is not responsible for medical decisions that may be made by the patient based on the risk/benefit calculator estimates, since these estimates are provided for informational purposes.  Patients should always consult their doctor or other health care provider before deciding on a treatment plan. Both open and laparoscopic approach were explained in details. Benefits and risks explained. Informed consent obtained. Risks including but not limited to; Infection, bleeding, gastric leak or obstruction, persistent nausea, vomiting, or reflux, injury to surrounding structures, risks of anesthesia, stricture, delayed gastric emptying, staple line leak, incisional hernia, malnutrition, vitamin deficiency, neurological complications, paralysis,  hair loss, and/ or Conversion to Open surgery may be necessary. Failure to lose or maintain weight loss, Gallstones or Kidney Stones, Deep Venous Thrombosis , pulmonary embolism and / or death. With obesity and/or Fatty liver , I may elect to perform liver core biopsy to have  Baseline idea on liver pathology if there is abnormal Liver Functions or if there is hepatomegaly &/or lesion, risks include but not limited to bile leak, liver hematoma or failure to diagnose a condition. I did explain thoroughly to the patient that compliance with pre- and post op diet and other recommendations are integral part to improve the chances of successful weight loss and also not following it could end with serious health complications. We discussed that our goal is to ameliorate the medical problems and not to obtain a specific body mass index. Patient understands the risks and benefits and wishes to proceed with the procedure. Also understands if BMI is lower than 40 without significant co morbid conditions, concerns for risks of surgery being somewhat higher over long run, however patient wants to proceed and fully understands the risks.  Clearly BMI over 35 does impose very serious health risks as well chances of losing weight on diet only is very limited and sustaining weight loss is even less, thus surgery is certainly recommended for long term weight loss and better health overall given compliance. Obesity as a disease is considered a high risk to patients overall health and should therefore be considered a high risk disease state. Now with Covid-19 pandemic, CDC and health authorities does classify obese patients as vulnerable and high risk as well. Which makes weight loss a priority for improvement of their wellbeing and overall health. I advised the patient that we can't guarantee final insurance approval.        I advised the patient that sometimes other indicated procedures may arise and the decision will be based on my assessment intraoperatively. Some may include include but not limited to:  [Ventral Hernia, Risks include but not limited to; infection, bleeding, injury to organs or nerves or vessels, PE, DVT, cardiac events, , persistent pain or symptoms, abscess, hernia recurrence or need for further procedures. However that will be determined intra-operatively, if not safe to proceed , then any additional procedures will have to be addressed later as primary goal is bariatric surgery.]      [ Hiatal Hernia, will attempt repair,  risks and benefits including but not limited to; hemothorax, pneumothorax, recurrence, difficulty swallowing, persistent symptoms, reflux and need for medications, esophageal, splenic, lung, heart, bowel, vagus nerve or gastric injuries. However that will be determined intra-operatively, if not safe to proceed, then any additional procedures will have to be addressed later as primary goal is bariatric surgery.]     Alec Melgar understands that there may be a need to perform other urgent or necessary procedures that were unanticipated.  Maricel Ventura consent to the performance of any additional procedures determined during the original procedure to be in their best interests and where delay might cause additional harm or put patient at risk for additional anesthesia risk for required by a second procedure and that will be determined by the surgeon. Jose Manuelkojo Melanie is aware if Weight gain occurs in pre-op period while on pre-operative diet or  non compliant with it , surgery will be canceled. Kaylyn Aragon understand that in relation to the COVID-19 pandemic and surgical procedures, patient have been given the opportunity to postpone   surgery until a  later date. Jesus Navarro have chosen to proceed with surgery knowing the risks associated with COVID-19. Kaylyn Aragon was satisfied with the discussion we had and Kaylyn Aragon had no further questions at end of visit and wants to proceed with surgery. 1- Pre-operative work up ordered for you(labs/x-rays/EKG). 2- Stop taking Blood thinners,one week before surgery. 3- F/U with PCP for pre-op Medical Clearance. 4- Plan for Laparoscopic Sleeve, possible other indicated procedures. Morbid Obesity: Body mass index is 41.02 kg/m². [x] Continue to make dietary and lifestyle modifications. [x] Plan for Future laparoscopic sleeve gastrectomy. [x] Return for follow-up next month. Chronic GERD:   [x] Continue to make dietary and lifestyle modifications. [] Continue Omeprazole. [] Continue Famotidine. [x] Weight loss Recommended. Essential Hypertension:  [x] Continue to make dietary and lifestyle modifications. [x] Reviewed the importance of checking blood pressure. [x] Continue to follow up with their PCP for medication management and monitoring. [x] Weight loss Recommended. Prediabetes:   [x] Continue to make dietary and lifestyle modifications. [x] Reviewed the importance of checking blood sugars. [x] Continue to follow up with their PCP for medication management and monitoring. [x] Weight loss Recommended. Hyperlipidemia:   [x] Continue to make dietary and lifestyle modifications. [x] Continue to follow up with their PCP for medication management and monitoring. [x] Weight loss Recommended.         Please note that some or all of this report was generated using voice recognition software. Please notify me in case of any questions about the content of this document, as some errors in transcription may have occurred .

## 2022-10-31 ENCOUNTER — TELEPHONE (OUTPATIENT)
Dept: BARIATRICS/WEIGHT MGMT | Age: 47
End: 2022-10-31

## 2022-10-31 ASSESSMENT — ENCOUNTER SYMPTOMS
ALLERGIC/IMMUNOLOGIC NEGATIVE: 1
GASTROINTESTINAL NEGATIVE: 1
SHORTNESS OF BREATH: 0
RESPIRATORY NEGATIVE: 1
COUGH: 0
EYES NEGATIVE: 1

## 2022-10-31 NOTE — PROGRESS NOTES
[facility]) Physicians   [employer]    Subjective:      Patient ID: Asim Ramires is a 48 y.o. female    HPI    The patient is here for their bariatric surgery preoperative education group visit. She has made several attempts at weight loss in the past without success and now has been scheduled to have bariatric surgery on December 5, 2022 for future weight loss. She is working to change her dietary behaviors and lose weight to improve comorbid conditions such as hypertension, prediabetes, hyperlipidemia and GERD. Asim Ramires is a very pleasant 48 y.o. female with Body mass index is 40.51 kg/m². Past Medical History:   Diagnosis Date    Chronic GERD 7/28/2022    Disease of both eyes characterized by increased eye pressure 4/23/2018    Glaucoma     Have a Cupped Optic Nerve. Ask me about. Hypertension     Localized osteoarthrosis, lower leg 2/17/2016    Mixed hyperlipidemia 8/18/2022    Obesity     Thyroid disease      Past Surgical History:   Procedure Laterality Date    BREAST BIOPSY      COLONOSCOPY  2006    Ask me about.     UPPER GASTROINTESTINAL ENDOSCOPY N/A 9/23/2022    EGD BIOPSY performed by Andrew Arce MD at [address] [place] [address]     Family History   Problem Relation Age of Onset    Arthritis Mother         Rheumatoid    Cancer Mother         Colon Cancer    Heart Disease Father         Quad Bypass    High Blood Pressure Father     Brain Cancer Sister 61    Heart Disease Maternal Grandmother 52    Heart Disease Maternal Grandfather     Emphysema Paternal Grandmother     Heart Disease Paternal Grandfather     Cancer Sister         Brain Tumor/ Cancer    Early Death Sister         Age 35     Social History     Tobacco Use    Smoking status: Never    Smokeless tobacco: Never   Substance Use Topics    Alcohol use: Not Currently     Alcohol/week: 21.0 standard drinks     Types: 15 Cans of beer, 6 Shots of liquor per week     Comment: 3 days     I counseled the patient on the importance of not smoking and risks of ETOH. 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     Current Outpatient Medications:     aspirin EC 81 MG EC tablet, Take 1 tablet by mouth daily, Disp: 90 tablet, Rfl: 1    atorvastatin (LIPITOR) 40 MG tablet, Take 1 tablet by mouth daily, Disp: 90 tablet, Rfl: 1    omeprazole (PRILOSEC) 20 MG delayed release capsule, Take 1 capsule by mouth every morning (before breakfast), Disp: 90 capsule, Rfl: 1    spironolactone (ALDACTONE) 25 MG tablet, TAKE 1 TABLET DAILY, Disp: 90 tablet, Rfl: 1    vitamin D (CHOLECALCIFEROL) 28351 UNIT CAPS, Take 1 capsule by mouth once a week, Disp: 12 capsule, Rfl: 0    folic acid (V-R FOLIC ACID) 651 MCG tablet, Take 1 tablet by mouth daily, Disp: 30 tablet, Rfl: 3    metoprolol succinate (TOPROL XL) 50 MG extended release tablet, TAKE 1 TABLET NIGHTLY, Disp: 90 tablet, Rfl: 1    lisinopril (PRINIVIL;ZESTRIL) 20 MG tablet, TAKE 1 TABLET NIGHTLY, Disp: 90 tablet, Rfl: 3    levothyroxine (SYNTHROID) 100 MCG tablet, TAKE 1 TABLET DAILY, Disp: 90 tablet, Rfl: 3    Lab Results   Component Value Date/Time    WBC 6.7 08/08/2022 01:53 PM    RBC 5.31 08/08/2022 01:53 PM    HGB 13.8 08/08/2022 01:53 PM    HCT 41.5 08/08/2022 01:53 PM    MCV 78.1 08/08/2022 01:53 PM    MCH 26.1 08/08/2022 01:53 PM    MCHC 33.4 08/08/2022 01:53 PM    MPV 8.0 08/08/2022 01:53 PM    NEUTOPHILPCT 63.8 08/08/2022 01:53 PM    LYMPHOPCT 27.7 08/08/2022 01:53 PM    MONOPCT 4.3 08/08/2022 01:53 PM    EOSRELPCT 3.3 08/08/2022 01:53 PM    BASOPCT 0.9 08/08/2022 01:53 PM    NEUTROABS 4.2 08/08/2022 01:53 PM    LYMPHSABS 1.8 08/08/2022 01:53 PM    MONOSABS 0.3 08/08/2022 01:53 PM    EOSABS 0.2 08/08/2022 01:53 PM     Lab Results   Component Value Date/Time    NA 139 08/08/2022 01:53 PM    K 4.5 08/08/2022 01:53 PM    CL 99 08/08/2022 01:53 PM    CO2 26 08/08/2022 01:53 PM    ANIONGAP 14 08/08/2022 01:53 PM    GLUCOSE 91 08/08/2022 01:53 PM    BUN 12 08/08/2022 01:53 PM    CREATININE 0.7 08/08/2022 01:53 PM    LABGLOM >60 08/08/2022 01:53 PM    GFRAA >60 08/08/2022 01:53 PM    GFRAA >60 03/05/2013 08:12 AM    CALCIUM 10.0 08/08/2022 01:53 PM    PROT 7.2 08/08/2022 01:53 PM    PROT 7.0 03/05/2013 08:12 AM    LABALBU 4.6 08/08/2022 01:53 PM    AGRATIO 1.8 08/08/2022 01:53 PM    BILITOT 0.5 08/08/2022 01:53 PM    ALKPHOS 102 08/08/2022 01:53 PM    ALT 15 08/08/2022 01:53 PM    AST 14 08/08/2022 01:53 PM    GLOB 2.5 06/28/2021 09:55 AM     Lab Results   Component Value Date/Time    CHOL 210 08/08/2022 01:53 PM    TRIG 91 08/08/2022 01:53 PM    HDL 60 08/08/2022 01:53 PM    HDL 86 10/21/2011 08:44 AM    LDLCALC 132 08/08/2022 01:53 PM    LABVLDL 18 08/08/2022 01:53 PM     Lab Results   Component Value Date/Time    TSHREFLEX 3.27 08/08/2022 01:53 PM     Lab Results   Component Value Date/Time    IRON 109 08/08/2022 01:53 PM    TIBC 334 08/08/2022 01:53 PM    LABIRON 33 08/08/2022 01:53 PM     Lab Results   Component Value Date/Time    IIIZCRQE06 269 08/08/2022 01:53 PM    FOLATE 4.23 08/08/2022 01:53 PM     Lab Results   Component Value Date/Time    VITD25 29.5 08/08/2022 01:53 PM     Lab Results   Component Value Date/Time    LABA1C 5.8 08/08/2022 01:53 PM    .8 08/08/2022 01:53 PM       Review of Systems   Constitutional: Negative. Negative for chills, fatigue and fever. HENT: Negative. Eyes: Negative. Respiratory: Negative. Negative for cough and shortness of breath. Cardiovascular: Negative. Gastrointestinal: Negative. Endocrine: Negative. Genitourinary: Negative. Musculoskeletal: Negative. Skin: Negative. Allergic/Immunologic: Negative. Neurological: Negative. Hematological: Negative. Psychiatric/Behavioral: Negative. Objective:     Physical Exam  Vitals reviewed. Constitutional:       Appearance: Normal appearance. She is well-developed. She is obese. HENT:      Head: Normocephalic and atraumatic. Eyes:      Conjunctiva/sclera: Conjunctivae normal.      Pupils: Pupils are equal, round, and reactive to light. Pulmonary:      Effort: Pulmonary effort is normal.   Abdominal:      Palpations: Abdomen is soft. Musculoskeletal:         General: Normal range of motion. Cervical back: Normal range of motion and neck supple. Skin:     General: Skin is warm and dry. Neurological:      Mental Status: She is alert and oriented to person, place, and time. Psychiatric:         Mood and Affect: Mood normal.         Behavior: Behavior normal.         Thought Content: Thought content normal.         Judgment: Judgment normal.     Assessment and Plan:   Patient is here for their preoperative education group visit for sleeve gastrectomy. The patient is down 3 lbs today. The patient's current Body mass index is 40.51 kg/m². (11/8/22). She is making good dietary and behavior modifications and is considered to be a good surgical candidate. Patient has a diagnosis of hypertension. Reviewed the importance of checking blood pressure preoperatively and postoperatively. In addition, following up with their PCP for medication adjustments as needed. Discussed the fact that they will be required to crush or open their medications for the first two weeks after surgery and reviewed those medications that can not be crushed. Patient has a diagnosis of prediabetes. Reviewed the importance of complying with post op diet. Patient has a diagnosis of hyperlipidemia. Discussed the benefits of weight loss and dietary changes on lipids and cholesterol. Discussed the fact that they will be required to crush or open their medications for the first two weeks after surgery and reviewed those medications that can not be crushed.     Patient has a diagnosis of chronic GERD and takes a PPI. Discussed the benefits of weight loss and dietary changes on acid reflux. However, they will most likely need to continue their medication short term after surgery as they adjust to the new diet. Discussed the fact that they will be required to crush or open their medications for the first two weeks after surgery and reviewed those medications that can not be crushed. Patient received instruction that it is recommended to avoid pregnancy following bariatric surgery for at least 2 years to allow them to have stable weight loss and to help avoid increased risk of vitamin deficiencies and malnutrition. The patient was encouraged to discuss possible contraceptive methods with their PCP or OBGYN. Patient received instructions from the registered dietitian in reference to the two week preoperative diet and the four phases of their postoperative diet. In addition I reviewed these instructions and stressed the importance of following these recommendations for their safety. Patient completed the preoperative class where they were provided with education related to their bariatric surgery, common surgical complications, medications preoperatively & postoperatively, special concerns related to bariatric surgery postoperatively, vitamin supplementation, patient agreement, PAT & scheduling, hospital course, wellness discovery program and what to do in the case of an emergency postoperatively. The dietitians reviewed all preoperative and postoperative diet instructions. Patient was given the opportunity to ask questions during the group visit and these questions were answered by myself and/or the dietitian. I spent a total of 45 minutes on the day of the visit and over half of that time was spent counseling the patient on proper dietary behaviors, exercise and surgery protocols.

## 2022-10-31 NOTE — TELEPHONE ENCOUNTER
Pt returned  call-  unavailable at time of call back.  Informed pt we would call back when available

## 2022-11-04 NOTE — PROGRESS NOTES
Patient Name:   Richard Melendrez        Type of Surgery:  Sleeve         Date of Surgery:  12/5/22        Start Pre-Op Diet On:  11/22/22        Start Clear Liquids On:  12/4/22          NPO after midnight the night before your surgery! Take morning medications with a small amount of water.     NOTES:_______________________________________  ______________________________________________

## 2022-11-08 ENCOUNTER — TELEPHONE (OUTPATIENT)
Dept: FAMILY MEDICINE CLINIC | Age: 47
End: 2022-11-08

## 2022-11-08 ENCOUNTER — TELEPHONE (OUTPATIENT)
Dept: BARIATRICS/WEIGHT MGMT | Age: 47
End: 2022-11-08

## 2022-11-08 ENCOUNTER — OFFICE VISIT (OUTPATIENT)
Dept: BARIATRICS/WEIGHT MGMT | Age: 47
End: 2022-11-08
Payer: COMMERCIAL

## 2022-11-08 VITALS — HEIGHT: 64 IN | WEIGHT: 236 LBS | BODY MASS INDEX: 40.29 KG/M2

## 2022-11-08 DIAGNOSIS — R73.03 PREDIABETES: ICD-10-CM

## 2022-11-08 DIAGNOSIS — I10 ESSENTIAL HYPERTENSION: ICD-10-CM

## 2022-11-08 DIAGNOSIS — E78.2 MIXED HYPERLIPIDEMIA: ICD-10-CM

## 2022-11-08 DIAGNOSIS — E66.01 MORBID OBESITY WITH BMI OF 40.0-44.9, ADULT (HCC): ICD-10-CM

## 2022-11-08 DIAGNOSIS — K21.9 CHRONIC GERD: Primary | ICD-10-CM

## 2022-11-08 PROCEDURE — 99214 OFFICE O/P EST MOD 30 MIN: CPT | Performed by: NURSE PRACTITIONER

## 2022-11-08 NOTE — TELEPHONE ENCOUNTER
----- Message from Tate Cluadio sent at 11/8/2022 10:04 AM EST -----  Subject: Appointment Request    Reason for Call: Established Patient Appointment needed: Routine Pre-Op    QUESTIONS    Reason for appointment request? No appointments available during search     Additional Information for Provider? Patient is calling because she needs   a Pre-op 12/5, Gastrectomy Sleeves (stomach) w/ Dr. Meghann Lane at [facility]. No available appointment with Dr. Jones Montes. Please contact patient   to schedule appointment.  Please advise  ---------------------------------------------------------------------------  --------------  Yanet URIAS  [phone]; OK to leave message on voicemail  ---------------------------------------------------------------------------  --------------  SCRIPT ANSWERS  COVID Screen: Bernabe Youssef

## 2022-11-16 ENCOUNTER — TELEPHONE (OUTPATIENT)
Dept: CARDIOLOGY CLINIC | Age: 47
End: 2022-11-16

## 2022-11-16 NOTE — TELEPHONE ENCOUNTER
[facility] please review testing and advise on final clearance.      Echo 10/12/2022  CT calcium score 10/12/2022

## 2022-11-16 NOTE — TELEPHONE ENCOUNTER
CARDIAC CLEARANCE REQUEST    What type of procedure are you having:  Gastric sleeve  Are you taking any blood thinners:  Asprin 81mg  Type on anesthesia:    When is your procedure scheduled for:  12/05/2022  What physician is performing your procedure:  Dr. Ezio Pacheco  Phone Number:    Fax number to send the letter:      Clearance was based off of testing. Can place clearance letter in [facility].

## 2022-11-16 NOTE — LETTER
[facility] Card[facility] - [address]  Phone: [phone]  Fax: [phone]    DO Cristiane Matute   [id number]/[id number]       November 17, 2022      To whom it may concern,     Patient is acceptable cardiac risk for proceeding with planned surgery. Can continue aspirin, statin, and beta-blocker throughout perioperative period.       Sincerely,        Jenifer Christianson DO

## 2022-11-17 ENCOUNTER — OFFICE VISIT (OUTPATIENT)
Dept: FAMILY MEDICINE CLINIC | Age: 47
End: 2022-11-17
Payer: COMMERCIAL

## 2022-11-17 VITALS
HEART RATE: 62 BPM | DIASTOLIC BLOOD PRESSURE: 72 MMHG | WEIGHT: 240.2 LBS | SYSTOLIC BLOOD PRESSURE: 118 MMHG | OXYGEN SATURATION: 100 % | BODY MASS INDEX: 41.23 KG/M2

## 2022-11-17 DIAGNOSIS — Z23 NEED FOR INFLUENZA VACCINATION: ICD-10-CM

## 2022-11-17 DIAGNOSIS — I10 ESSENTIAL HYPERTENSION: Primary | ICD-10-CM

## 2022-11-17 DIAGNOSIS — R73.03 PREDIABETES: ICD-10-CM

## 2022-11-17 PROCEDURE — 90674 CCIIV4 VAC NO PRSV 0.5 ML IM: CPT | Performed by: FAMILY MEDICINE

## 2022-11-17 PROCEDURE — 99214 OFFICE O/P EST MOD 30 MIN: CPT | Performed by: FAMILY MEDICINE

## 2022-11-17 PROCEDURE — 3078F DIAST BP <80 MM HG: CPT | Performed by: FAMILY MEDICINE

## 2022-11-17 PROCEDURE — 90471 IMMUNIZATION ADMIN: CPT | Performed by: FAMILY MEDICINE

## 2022-11-17 PROCEDURE — 3074F SYST BP LT 130 MM HG: CPT | Performed by: FAMILY MEDICINE

## 2022-11-17 NOTE — PROGRESS NOTES
Preoperative Consultation      Jeramy Adam  YOB: 1975    Date of Service:  11/17/2022    Vitals:    11/17/22 1609   BP: 118/72   Site: Left Upper Arm   Position: Sitting   Cuff Size: Small Adult   Pulse: 62   SpO2: 100%   Weight: 240 lb 3.2 oz (109 kg)      Wt Readings from Last 2 Encounters:   11/17/22 240 lb 3.2 oz (109 kg)   11/08/22 236 lb (107 kg)     BP Readings from Last 3 Encounters:   11/17/22 118/72   10/27/22 116/68   09/28/22 126/76        Chief Complaint   Patient presents with    Ortega Pacheco 12/05/2022     Allergies   Allergen Reactions    Nickel Other (See Comments)     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    Codeine Nausea Only, Rash and Nausea And Vomiting     Outpatient Medications Marked as Taking for the 11/17/22 encounter (Office Visit) with Genny Vieyra MD   Medication Sig Dispense Refill    aspirin EC 81 MG EC tablet Take 1 tablet by mouth daily 90 tablet 1    atorvastatin (LIPITOR) 40 MG tablet Take 1 tablet by mouth daily 90 tablet 1    omeprazole (PRILOSEC) 20 MG delayed release capsule Take 1 capsule by mouth every morning (before breakfast) 90 capsule 1    spironolactone (ALDACTONE) 25 MG tablet TAKE 1 TABLET DAILY 90 tablet 1    folic acid (V-R FOLIC ACID) 526 MCG tablet Take 1 tablet by mouth daily 30 tablet 3    metoprolol succinate (TOPROL XL) 50 MG extended release tablet TAKE 1 TABLET NIGHTLY 90 tablet 1    lisinopril (PRINIVIL;ZESTRIL) 20 MG tablet TAKE 1 TABLET NIGHTLY 90 tablet 3    levothyroxine (SYNTHROID) 100 MCG tablet TAKE 1 TABLET DAILY 90 tablet 3       This patient presents to the office today for a preoperative consultation at the request of surgeon, Dr. Nicholas Santiago, who plans on performing sleeve gastrectomy on December 5 at [facility].  The current problem began many years ago, and symptoms have been unchanged with time.       Planned anesthesia: General   Known anesthesia problems: None   Bleeding risk: No recent or remote history of abnormal bleeding  Personal or FH of DVT/PE: No    Patient objection to receiving blood products: No    Patient Active Problem List   Diagnosis    Essential hypertension    Hypothyroid    Right knee pain    Patellofemoral arthrosis    Localized osteoarthrosis, lower leg    Family history of malignant neoplasm of digestive organ    Disease of both eyes characterized by increased eye pressure    Morbid obesity with BMI of 40.0-44.9, adult ([address]    Prediabetes    Chronic GERD    Mixed hyperlipidemia       Past Medical History:   Diagnosis Date    Chronic GERD 7/28/2022    Disease of both eyes characterized by increased eye pressure 4/23/2018    Glaucoma     Have a Cupped Optic Nerve. Ask me about. Hypertension     Localized osteoarthrosis, lower leg 2/17/2016    Mixed hyperlipidemia 8/18/2022    Obesity     Thyroid disease      Past Surgical History:   Procedure Laterality Date    BREAST BIOPSY      COLONOSCOPY  2006    Ask me about.     UPPER GASTROINTESTINAL ENDOSCOPY N/A 9/23/2022    EGD BIOPSY performed by Sherly Barrera MD at [address] [place] [address]     Family History   Problem Relation Age of Onset    Arthritis Mother         Rheumatoid    Cancer Mother         Colon Cancer    Heart Disease Father         Quad Bypass    High Blood Pressure Father     Brain Cancer Sister 61    Heart Disease Maternal Grandmother 52    Heart Disease Maternal Grandfather     Emphysema Paternal Grandmother     Heart Disease Paternal Grandfather     Cancer Sister         Brain Tumor/ Cancer    Early Death Sister         Age 35     Social History     Socioeconomic History    Marital status:      Spouse name: Not on file    Number of children: Not on file    Years of education: Not on file    Highest education level: Not on file   Occupational History    Not on file   Tobacco Use    Smoking status: Never    Smokeless tobacco: Never   Substance and Sexual Activity has normal strength. No cranial nerve deficit or sensory deficit. Coordination and gait normal.   Skin: Skin is warm and dry. No rash noted. No erythema. Psychiatric: She has a normal mood and affect. Her behavior is normal.     EKG Interpretation:  9/28/22, Sinus  Rhythm   Low voltage in precordial leads.    -Anteroseptal infarct -age undetermined.  .    Lab Review   Hospital Outpatient Visit on 10/12/2022   Component Date Value    Left Ventricular Ejectio* 10/12/2022 58     LVEF MODALITY 10/12/2022 ECHO    Admission on 09/23/2022, Discharged on 09/23/2022   Component Date Value    HCG(Urine) Pregnancy Test 09/23/2022 Negative    Orders Only on 08/08/2022   Component Date Value    Protime 08/08/2022 13.5     INR 08/08/2022 1.04     Alpha-Tocopherol 08/08/2022 8.2     Gamma-Tocopherol 08/08/2022 2.1     Vit D, 25-Hydroxy 08/08/2022 29.5 (A)     Vitamin B-12 08/08/2022 269     Folate 08/08/2022 4.23 (A)     Vitamin B1,Whole Blood 08/08/2022 110     Vitamin A 08/08/2022 0.61     Vitamin A, Interp 08/08/2022 Normal     RETINYL PALMITATE 08/08/2022 0.03     TSH 08/08/2022 3.27     Cholesterol, Total 08/08/2022 210 (A)     Triglycerides 08/08/2022 91     HDL 08/08/2022 60     LDL Calculated 08/08/2022 132 (A)     VLDL Cholesterol Calcula* 08/08/2022 18     Iron 08/08/2022 109     TIBC 08/08/2022 334     Iron Saturation 08/08/2022 33     Hemoglobin A1C 08/08/2022 5.8     eAG 08/08/2022 119.8     Sodium 08/08/2022 139     Potassium 08/08/2022 4.5     Chloride 08/08/2022 99     CO2 08/08/2022 26     Anion Gap 08/08/2022 14     Glucose 08/08/2022 91     BUN 08/08/2022 12     Creatinine 08/08/2022 0.7     GFR Non- 08/08/2022 >60     GFR  08/08/2022 >60     Calcium 08/08/2022 10.0     Total Protein 08/08/2022 7.2     Albumin 08/08/2022 4.6     Albumin/Globulin Ratio 08/08/2022 1.8     Total Bilirubin 08/08/2022 0.5     Alkaline Phosphatase 08/08/2022 102     ALT 08/08/2022 15     AST 08/08/2022 14 (A)     WBC 08/08/2022 6.7     RBC 08/08/2022 5.31 (A)     Hemoglobin 08/08/2022 13.8     Hematocrit 08/08/2022 41.5     MCV 08/08/2022 78.1 (A)     MCH 08/08/2022 26.1     MCHC 08/08/2022 33.4     RDW 08/08/2022 13.3     Platelets 14/44/3145 283     MPV 08/08/2022 8.0     Neutrophils % 08/08/2022 63.8     Lymphocytes % 08/08/2022 27.7     Monocytes % 08/08/2022 4.3     Eosinophils % 08/08/2022 3.3     Basophils % 08/08/2022 0.9     Neutrophils Absolute 08/08/2022 4.2     Lymphocytes Absolute 08/08/2022 1.8     Monocytes Absolute 08/08/2022 0.3     Eosinophils Absolute 08/08/2022 0.2     Basophils Absolute 08/08/2022 0.1             Assessment:       48 y.o. patient with planned surgery as above. Known risk factors for perioperative complications: Hypertension (excellent control), impaired glucose tolerance  Current medications which may produce withdrawal symptoms if withheld perioperatively: none      Plan:     1. Preoperative workup as follows: none  2. Change in medication regimen before surgery: Discontinue ASA 7 days before surgery  3. Prophylaxis for cardiac events with perioperative beta-blockers: Not indicated  ACC/AHA indications for pre-operative beta-blocker use:    Vascular surgery with history of postitive stress test  Intermediate or high risk surgery with history of CAD   Intermediate or high risk surgery with multiple clinical predictors of CAD- 2 of the following: history of compensated or prior heart failure, history of cerebrovascular disease, DM, or renal insufficiency    Routine administration of higher-dose, long-acting metoprolol in beta-blocker-naïve patients on the day of surgery, and in the absence of dose titration is associated with an overall increase in mortality. Beta-blockers should be started days to weeks prior to surgery and titrated to pulse < 70.  4. Deep vein thrombosis prophylaxis: regimen to be chosen by surgical team  5.  No contraindications to planned surgery Gary Woodard MD

## 2022-11-18 NOTE — PROGRESS NOTES
Name_______________________________________Printed:____________________  Date and time of surgery__12/5 1030______________________Arrival Time:_0830_______________   1. The instructions given regarding when and if a patient needs to stop oral intake prior to surgery varies. Follow the specific instructions you were given                  _x__Nothing to eat or to drink after Midnight the night before.                   ____Carbo loading or ERAS instructions will be given to select patients-if you have been given those instructions -please do the following                           The evening before your surgery after dinner before midnight drink 40 ounces of gatorade. If you are diabetic use sugar free. The morning of surgery drink 40 ounces of water. This needs to be finished 3 hours prior to your surgery start time. 2. Take the following pills with a small sip of water on the morning of surgery___none_______________________________________________                  Do not take blood pressure medications ending in pril or sartan the laury prior to surgery or the morning of surgery. Dr Martha Saavedra patient are not to take any medications the AM of surgery. 3. Aspirin, Ibuprofen, Advil, Naproxen, Vitamin E and other Anti-inflammatory products and supplements should be stopped for 5 -7days before surgery or as directed by your physician. 4. Check with your Doctor regarding stopping Plavix, Coumadin,Eliquis, Lovenox,Effient,Pradaxa,Xarelto, Fragmin or other blood thinners and follow their instructions. 5. Do not smoke, and do not drink any alcoholic beverages 24 hours prior to surgery. This includes NA Beer. Refrain from the usage of any recreational drugs. 6. You may brush your teeth and gargle the morning of surgery. DO NOT SWALLOW WATER   7. You MUST make arrangements for a responsible adult to stay on site while you are here and take you home after your surgery.  You will not be allowed to leave alone or drive yourself home. It is strongly suggested someone stay with you the first 24 hrs. Your surgery will be cancelled if you do not have a ride home. 8. A parent/legal guardian must accompany a child scheduled for surgery and plan to stay at the hospital until the child is discharged. Please do not bring other children with you. 9. Please wear simple, loose fitting clothing to the hospital.  Whit Montaño not bring valuables (money, credit cards, checkbooks, etc.) Do not wear any makeup (including no eye makeup) or nail polish on your fingers or toes. 10. DO NOT wear any jewelry or piercings on day of surgery. All body piercing jewelry must be removed. 11. If you have ___dentures, they will be removed before going to the OR; we will provide you a container. If you wear ___contact lenses or ___glasses, they will be removed; please bring a case for them. 12. Please see your family doctor/pediatrician for a history & physical and/or concerning medications. Bring any test results/reports from your physician's office. PCP__________________Phone___________H&P Appt. Date________             13 If you  have a Living Will and Durable Power of  for Healthcare, please bring in a copy. 15. Notify your Surgeon if you develop any illness between now and surgery  time, cough, cold, fever, sore throat, nausea, vomiting, etc.  Please notify your surgeon if you experience dizziness, shortness of breath or blurred vision between now & the time of your surgery             15. DO NOT shave your operative site 96 hours prior to surgery. For face & neck surgery, men may use an electric razor 48 hours prior to surgery. 16. Shower the night before or morning of surgery using an antibacterial soap or as you have been instructed. 17. To provide excellent care visitors will be limited to one in the room at any given time.              18.  Please bring picture ID and insurance card. 19.  Visit our web site for additional information:  [email or web address]              20.During flu season no children under the age of 15 are permitted in the hospital for the safety of all patients. 21. If you take a long acting insulin in the evening only  take half of your usual  dose the night  before your procedure              22. If you use a c-pap please bring DOS if staying overnight,             23.For your convenience [facility] has a pharmacy on site to fill your prescriptions. 24. If you use oxygen and have a portable tank please bring it  with you the DOS             25. Bring a complete list of all your medications with name and dose include any supplements. 26. Other_PATs by 11/30_________________________________________   *Please call pre admission testing if you any further questions   Osei Tiffanie         [address] [place], [place] [phone]    [facility] [address] [place] [address]  [phone]   [address] [place] [address]       VISITOR POLICY(subject to change)    Current policy is 2 visitors per patient. No children. Mask is  at the discretion of the facility. Visiting hours are 8a-8p. Overnight visitors will be at the discretion of the nurse. All policies subject to change. All above information reviewed with patient in person or by phone. Patient verbalizes understanding. All questions and concerns addressed.                                                                                                  Patient/Rep____per phone/pt________________                                                                                                                                    PRE OP INSTRUCTIONS

## 2022-11-25 ENCOUNTER — HOSPITAL ENCOUNTER (OUTPATIENT)
Age: 47
Discharge: HOME | End: 2022-11-25
Payer: COMMERCIAL

## 2022-11-25 DIAGNOSIS — Z01.818 PREOP TESTING: ICD-10-CM

## 2022-11-25 LAB
A/G RATIO: 1.6 (ref 1.1–2.2)
ABO/RH: NORMAL
ALBUMIN SERPL-MCNC: 4.5 G/DL (ref 3.4–5)
ALP BLD-CCNC: 110 U/L (ref 40–129)
ALT SERPL-CCNC: 21 U/L (ref 10–40)
ANION GAP SERPL CALCULATED.3IONS-SCNC: 16 MMOL/L (ref 3–16)
ANTIBODY SCREEN: NORMAL
AST SERPL-CCNC: 15 U/L (ref 15–37)
BILIRUB SERPL-MCNC: 0.5 MG/DL (ref 0–1)
BUN BLDV-MCNC: 24 MG/DL (ref 7–20)
CALCIUM SERPL-MCNC: 10.4 MG/DL (ref 8.3–10.6)
CHLORIDE BLD-SCNC: 96 MMOL/L (ref 99–110)
CO2: 24 MMOL/L (ref 21–32)
CREAT SERPL-MCNC: 0.8 MG/DL (ref 0.6–1.1)
GFR SERPL CREATININE-BSD FRML MDRD: >60 ML/MIN/{1.73_M2}
GLUCOSE BLD-MCNC: 87 MG/DL (ref 70–99)
HCT VFR BLD CALC: 42.7 % (ref 36–48)
HEMOGLOBIN: 14 G/DL (ref 12–16)
MCH RBC QN AUTO: 25.7 PG (ref 26–34)
MCHC RBC AUTO-ENTMCNC: 32.8 G/DL (ref 31–36)
MCV RBC AUTO: 78.2 FL (ref 80–100)
PDW BLD-RTO: 13.3 % (ref 12.4–15.4)
PLATELET # BLD: 295 K/UL (ref 135–450)
PMV BLD AUTO: 8.9 FL (ref 5–10.5)
POTASSIUM SERPL-SCNC: 4.9 MMOL/L (ref 3.5–5.1)
RBC # BLD: 5.47 M/UL (ref 4–5.2)
SODIUM BLD-SCNC: 136 MMOL/L (ref 136–145)
TOTAL PROTEIN: 7.3 G/DL (ref 6.4–8.2)
WBC # BLD: 8.5 K/UL (ref 4–11)

## 2022-11-25 PROCEDURE — 86900 BLOOD TYPING SEROLOGIC ABO: CPT

## 2022-11-25 PROCEDURE — 80053 COMPREHEN METABOLIC PANEL: CPT

## 2022-11-25 PROCEDURE — 85027 COMPLETE CBC AUTOMATED: CPT

## 2022-11-25 PROCEDURE — 86901 BLOOD TYPING SEROLOGIC RH(D): CPT

## 2022-11-25 PROCEDURE — 36415 COLL VENOUS BLD VENIPUNCTURE: CPT

## 2022-11-25 PROCEDURE — 86850 RBC ANTIBODY SCREEN: CPT

## 2022-11-30 ENCOUNTER — TELEPHONE (OUTPATIENT)
Dept: BARIATRICS/WEIGHT MGMT | Age: 47
End: 2022-11-30

## 2022-11-30 RX ORDER — METOPROLOL SUCCINATE 50 MG/1
TABLET, EXTENDED RELEASE ORAL
Qty: 90 TABLET | Refills: 1 | Status: SHIPPED | OUTPATIENT
Start: 2022-11-30

## 2022-11-30 NOTE — TELEPHONE ENCOUNTER
Refill Request     CONFIRM preferrred pharmacy with the patient. If Mail Order Rx - Pend for 90 day refill. Last Seen: Last Seen Department: 11/17/2022    Last Seen by PCP: 11/17/22     Last Written: 7/5/22 90 tablet 1 refill    If no future appointment scheduled, route STAFF MESSAGE with patient name to the [facility] for scheduling. Next Appointment: 4/18/23  Future Appointments   Date Time Provider Mag Wilson   12/15/2022  3:45 PM MD Rustam Lane [facility]   4/18/2023  8:00 AM MD MALCOM Adkins  [place] [facility]       Message sent to [address] [place] [address] to schedule appt with patient?   NO      Requested Prescriptions     Pending Prescriptions Disp Refills    metoprolol succinate (TOPROL XL) 50 MG extended release tablet [Pharmacy Med Name: METOPROL SUC TAB 50MG ER] 90 tablet 1     Sig: TAKE 1 TABLET NIGHTLY

## 2022-11-30 NOTE — TELEPHONE ENCOUNTER
PT date of service is incorrect, date of service is listed as November 28-29 and her surgery is supposed to be on dec 5th

## 2022-12-01 ENCOUNTER — TELEPHONE (OUTPATIENT)
Dept: BARIATRICS/WEIGHT MGMT | Age: 47
End: 2022-12-01

## 2022-12-01 NOTE — TELEPHONE ENCOUNTER
LVM for patient notifying her of new arrival time for surgery 12/5  6AM   Asked patient to call back and confirm she received this message

## 2022-12-05 ENCOUNTER — ANESTHESIA (OUTPATIENT)
Dept: OPERATING ROOM | Age: 47
DRG: 620 | End: 2022-12-05
Payer: COMMERCIAL

## 2022-12-05 ENCOUNTER — ANESTHESIA EVENT (OUTPATIENT)
Dept: OPERATING ROOM | Age: 47
DRG: 620 | End: 2022-12-05
Payer: COMMERCIAL

## 2022-12-05 ENCOUNTER — HOSPITAL ENCOUNTER (INPATIENT)
Age: 47
LOS: 1 days | Discharge: HOME | DRG: 620 | End: 2022-12-06
Attending: SURGERY | Admitting: SURGERY
Payer: COMMERCIAL

## 2022-12-05 DIAGNOSIS — E66.01 MORBID OBESITY (HCC): ICD-10-CM

## 2022-12-05 DIAGNOSIS — Z98.84 S/P LAPAROSCOPIC SLEEVE GASTRECTOMY: ICD-10-CM

## 2022-12-05 DIAGNOSIS — Z01.818 PREOP TESTING: Primary | ICD-10-CM

## 2022-12-05 DIAGNOSIS — K44.9 HIATAL HERNIA: ICD-10-CM

## 2022-12-05 DIAGNOSIS — K43.9 VENTRAL HERNIA WITHOUT OBSTRUCTION OR GANGRENE: ICD-10-CM

## 2022-12-05 PROBLEM — E88.810 METABOLIC SYNDROME: Status: ACTIVE | Noted: 2022-12-05

## 2022-12-05 PROBLEM — E88.81 METABOLIC SYNDROME: Status: ACTIVE | Noted: 2022-12-05

## 2022-12-05 LAB
ABO/RH: NORMAL
ANTIBODY SCREEN: NORMAL
GLUCOSE BLD-MCNC: 104 MG/DL (ref 70–99)
PERFORMED ON: ABNORMAL

## 2022-12-05 PROCEDURE — 3600000005 HC SURGERY LEVEL 5 BASE: Performed by: SURGERY

## 2022-12-05 PROCEDURE — 6360000002 HC RX W HCPCS: Performed by: SURGERY

## 2022-12-05 PROCEDURE — 88307 TISSUE EXAM BY PATHOLOGIST: CPT

## 2022-12-05 PROCEDURE — 6370000000 HC RX 637 (ALT 250 FOR IP): Performed by: SURGERY

## 2022-12-05 PROCEDURE — 3600000015 HC SURGERY LEVEL 5 ADDTL 15MIN: Performed by: SURGERY

## 2022-12-05 PROCEDURE — 0WQF4ZZ REPAIR ABDOMINAL WALL, PERCUTANEOUS ENDOSCOPIC APPROACH: ICD-10-PCS | Performed by: SURGERY

## 2022-12-05 PROCEDURE — 2720000010 HC SURG SUPPLY STERILE: Performed by: SURGERY

## 2022-12-05 PROCEDURE — A4217 STERILE WATER/SALINE, 500 ML: HCPCS | Performed by: SURGERY

## 2022-12-05 PROCEDURE — 0BQT4ZZ REPAIR DIAPHRAGM, PERCUTANEOUS ENDOSCOPIC APPROACH: ICD-10-PCS | Performed by: SURGERY

## 2022-12-05 PROCEDURE — 2500000003 HC RX 250 WO HCPCS: Performed by: SURGERY

## 2022-12-05 PROCEDURE — C9113 INJ PANTOPRAZOLE SODIUM, VIA: HCPCS | Performed by: SURGERY

## 2022-12-05 PROCEDURE — 2580000003 HC RX 258: Performed by: SURGERY

## 2022-12-05 PROCEDURE — 7100000000 HC PACU RECOVERY - FIRST 15 MIN: Performed by: SURGERY

## 2022-12-05 PROCEDURE — 6360000002 HC RX W HCPCS: Performed by: ANESTHESIOLOGY

## 2022-12-05 PROCEDURE — 2500000003 HC RX 250 WO HCPCS: Performed by: NURSE ANESTHETIST, CERTIFIED REGISTERED

## 2022-12-05 PROCEDURE — 36415 COLL VENOUS BLD VENIPUNCTURE: CPT

## 2022-12-05 PROCEDURE — 94150 VITAL CAPACITY TEST: CPT

## 2022-12-05 PROCEDURE — 7100000001 HC PACU RECOVERY - ADDTL 15 MIN: Performed by: SURGERY

## 2022-12-05 PROCEDURE — 3700000001 HC ADD 15 MINUTES (ANESTHESIA): Performed by: SURGERY

## 2022-12-05 PROCEDURE — 2580000003 HC RX 258: Performed by: NURSE ANESTHETIST, CERTIFIED REGISTERED

## 2022-12-05 PROCEDURE — 3700000000 HC ANESTHESIA ATTENDED CARE: Performed by: SURGERY

## 2022-12-05 PROCEDURE — 86901 BLOOD TYPING SEROLOGIC RH(D): CPT

## 2022-12-05 PROCEDURE — 2700000000 HC OXYGEN THERAPY PER DAY

## 2022-12-05 PROCEDURE — 94760 N-INVAS EAR/PLS OXIMETRY 1: CPT

## 2022-12-05 PROCEDURE — 0DB64Z3 EXCISION OF STOMACH, PERCUTANEOUS ENDOSCOPIC APPROACH, VERTICAL: ICD-10-PCS | Performed by: SURGERY

## 2022-12-05 PROCEDURE — 86900 BLOOD TYPING SEROLOGIC ABO: CPT

## 2022-12-05 PROCEDURE — 1200000000 HC SEMI PRIVATE

## 2022-12-05 PROCEDURE — 2709999900 HC NON-CHARGEABLE SUPPLY: Performed by: SURGERY

## 2022-12-05 PROCEDURE — 6360000002 HC RX W HCPCS: Performed by: NURSE ANESTHETIST, CERTIFIED REGISTERED

## 2022-12-05 PROCEDURE — 86850 RBC ANTIBODY SCREEN: CPT

## 2022-12-05 RX ORDER — METHYLENE BLUE 10 MG/ML
INJECTION INTRAVENOUS
Status: COMPLETED | OUTPATIENT
Start: 2022-12-05 | End: 2022-12-05

## 2022-12-05 RX ORDER — HYDRALAZINE HYDROCHLORIDE 20 MG/ML
10 INJECTION INTRAMUSCULAR; INTRAVENOUS
Status: DISCONTINUED | OUTPATIENT
Start: 2022-12-05 | End: 2022-12-06 | Stop reason: HOSPADM

## 2022-12-05 RX ORDER — LIDOCAINE HYDROCHLORIDE 10 MG/ML
1 INJECTION, SOLUTION EPIDURAL; INFILTRATION; INTRACAUDAL; PERINEURAL
Status: CANCELLED | OUTPATIENT
Start: 2022-12-05 | End: 2022-12-06

## 2022-12-05 RX ORDER — BUPIVACAINE HYDROCHLORIDE AND EPINEPHRINE 5; 5 MG/ML; UG/ML
INJECTION, SOLUTION EPIDURAL; INTRACAUDAL; PERINEURAL
Status: COMPLETED | OUTPATIENT
Start: 2022-12-05 | End: 2022-12-05

## 2022-12-05 RX ORDER — SODIUM CHLORIDE 0.9 % (FLUSH) 0.9 %
5-40 SYRINGE (ML) INJECTION PRN
Status: DISCONTINUED | OUTPATIENT
Start: 2022-12-05 | End: 2022-12-06 | Stop reason: HOSPADM

## 2022-12-05 RX ORDER — MAGNESIUM HYDROXIDE 1200 MG/15ML
LIQUID ORAL CONTINUOUS PRN
Status: DISCONTINUED | OUTPATIENT
Start: 2022-12-05 | End: 2022-12-05 | Stop reason: HOSPADM

## 2022-12-05 RX ORDER — HYOSCYAMINE SULFATE 0.5 MG/ML
250 INJECTION, SOLUTION SUBCUTANEOUS EVERY 4 HOURS PRN
Status: DISCONTINUED | OUTPATIENT
Start: 2022-12-05 | End: 2022-12-06 | Stop reason: HOSPADM

## 2022-12-05 RX ORDER — SODIUM CHLORIDE 9 MG/ML
INJECTION, SOLUTION INTRAVENOUS CONTINUOUS
Status: CANCELLED | OUTPATIENT
Start: 2022-12-05

## 2022-12-05 RX ORDER — EPHEDRINE SULFATE/0.9% NACL/PF 50 MG/5 ML
SYRINGE (ML) INTRAVENOUS PRN
Status: DISCONTINUED | OUTPATIENT
Start: 2022-12-05 | End: 2022-12-05 | Stop reason: SDUPTHER

## 2022-12-05 RX ORDER — SODIUM CHLORIDE, SODIUM LACTATE, POTASSIUM CHLORIDE, CALCIUM CHLORIDE 600; 310; 30; 20 MG/100ML; MG/100ML; MG/100ML; MG/100ML
INJECTION, SOLUTION INTRAVENOUS CONTINUOUS
Status: DISCONTINUED | OUTPATIENT
Start: 2022-12-05 | End: 2022-12-06

## 2022-12-05 RX ORDER — LIDOCAINE HYDROCHLORIDE 10 MG/ML
0.5 INJECTION, SOLUTION EPIDURAL; INFILTRATION; INTRACAUDAL; PERINEURAL ONCE
Status: DISCONTINUED | OUTPATIENT
Start: 2022-12-05 | End: 2022-12-05 | Stop reason: HOSPADM

## 2022-12-05 RX ORDER — ENOXAPARIN SODIUM 100 MG/ML
30 INJECTION SUBCUTANEOUS EVERY 12 HOURS SCHEDULED
Status: DISCONTINUED | OUTPATIENT
Start: 2022-12-05 | End: 2022-12-06 | Stop reason: HOSPADM

## 2022-12-05 RX ORDER — LIDOCAINE HYDROCHLORIDE 20 MG/ML
INJECTION, SOLUTION INFILTRATION; PERINEURAL PRN
Status: DISCONTINUED | OUTPATIENT
Start: 2022-12-05 | End: 2022-12-05 | Stop reason: SDUPTHER

## 2022-12-05 RX ORDER — LABETALOL HYDROCHLORIDE 5 MG/ML
5 INJECTION, SOLUTION INTRAVENOUS
Status: DISCONTINUED | OUTPATIENT
Start: 2022-12-05 | End: 2022-12-05 | Stop reason: HOSPADM

## 2022-12-05 RX ORDER — ONDANSETRON 2 MG/ML
4 INJECTION INTRAMUSCULAR; INTRAVENOUS EVERY 6 HOURS PRN
Status: DISCONTINUED | OUTPATIENT
Start: 2022-12-05 | End: 2022-12-06 | Stop reason: HOSPADM

## 2022-12-05 RX ORDER — ONDANSETRON 2 MG/ML
INJECTION INTRAMUSCULAR; INTRAVENOUS PRN
Status: DISCONTINUED | OUTPATIENT
Start: 2022-12-05 | End: 2022-12-05 | Stop reason: SDUPTHER

## 2022-12-05 RX ORDER — LABETALOL HYDROCHLORIDE 5 MG/ML
10 INJECTION, SOLUTION INTRAVENOUS
Status: DISCONTINUED | OUTPATIENT
Start: 2022-12-05 | End: 2022-12-06 | Stop reason: HOSPADM

## 2022-12-05 RX ORDER — METOPROLOL TARTRATE 5 MG/5ML
2.5 INJECTION INTRAVENOUS EVERY 6 HOURS
Status: DISCONTINUED | OUTPATIENT
Start: 2022-12-05 | End: 2022-12-06

## 2022-12-05 RX ORDER — APREPITANT 80 MG/1
80 CAPSULE ORAL ONCE
Status: COMPLETED | OUTPATIENT
Start: 2022-12-05 | End: 2022-12-05

## 2022-12-05 RX ORDER — PROPOFOL 10 MG/ML
INJECTION, EMULSION INTRAVENOUS PRN
Status: DISCONTINUED | OUTPATIENT
Start: 2022-12-05 | End: 2022-12-05 | Stop reason: SDUPTHER

## 2022-12-05 RX ORDER — HYDROMORPHONE HCL 110MG/55ML
0.25 PATIENT CONTROLLED ANALGESIA SYRINGE INTRAVENOUS EVERY 5 MIN PRN
Status: DISCONTINUED | OUTPATIENT
Start: 2022-12-05 | End: 2022-12-05 | Stop reason: HOSPADM

## 2022-12-05 RX ORDER — METOCLOPRAMIDE HYDROCHLORIDE 5 MG/ML
10 INJECTION INTRAMUSCULAR; INTRAVENOUS EVERY 6 HOURS PRN
Status: DISCONTINUED | OUTPATIENT
Start: 2022-12-05 | End: 2022-12-06 | Stop reason: HOSPADM

## 2022-12-05 RX ORDER — FIBRINOGEN HUMAN AND THROMBIN HUMAN 1 ML
KIT TOPICAL
Status: COMPLETED | OUTPATIENT
Start: 2022-12-05 | End: 2022-12-05

## 2022-12-05 RX ORDER — SODIUM CHLORIDE, SODIUM LACTATE, POTASSIUM CHLORIDE, CALCIUM CHLORIDE 600; 310; 30; 20 MG/100ML; MG/100ML; MG/100ML; MG/100ML
INJECTION, SOLUTION INTRAVENOUS CONTINUOUS
Status: DISCONTINUED | OUTPATIENT
Start: 2022-12-05 | End: 2022-12-05 | Stop reason: HOSPADM

## 2022-12-05 RX ORDER — OXYCODONE HYDROCHLORIDE 5 MG/1
5 TABLET ORAL
Status: DISCONTINUED | OUTPATIENT
Start: 2022-12-05 | End: 2022-12-05 | Stop reason: HOSPADM

## 2022-12-05 RX ORDER — DIPHENHYDRAMINE HYDROCHLORIDE 50 MG/ML
12.5 INJECTION INTRAMUSCULAR; INTRAVENOUS EVERY 6 HOURS PRN
Status: DISCONTINUED | OUTPATIENT
Start: 2022-12-05 | End: 2022-12-06 | Stop reason: HOSPADM

## 2022-12-05 RX ORDER — MIDAZOLAM HYDROCHLORIDE 1 MG/ML
INJECTION INTRAMUSCULAR; INTRAVENOUS PRN
Status: DISCONTINUED | OUTPATIENT
Start: 2022-12-05 | End: 2022-12-05 | Stop reason: SDUPTHER

## 2022-12-05 RX ORDER — NALOXONE HYDROCHLORIDE 0.4 MG/ML
INJECTION, SOLUTION INTRAMUSCULAR; INTRAVENOUS; SUBCUTANEOUS PRN
Status: DISCONTINUED | OUTPATIENT
Start: 2022-12-05 | End: 2022-12-06

## 2022-12-05 RX ORDER — ACETAMINOPHEN 160 MG/5ML
650 SOLUTION ORAL ONCE
Status: COMPLETED | OUTPATIENT
Start: 2022-12-05 | End: 2022-12-05

## 2022-12-05 RX ORDER — FENTANYL CITRATE 50 UG/ML
INJECTION, SOLUTION INTRAMUSCULAR; INTRAVENOUS PRN
Status: DISCONTINUED | OUTPATIENT
Start: 2022-12-05 | End: 2022-12-05 | Stop reason: SDUPTHER

## 2022-12-05 RX ORDER — ONDANSETRON 2 MG/ML
4 INJECTION INTRAMUSCULAR; INTRAVENOUS
Status: DISCONTINUED | OUTPATIENT
Start: 2022-12-05 | End: 2022-12-05 | Stop reason: HOSPADM

## 2022-12-05 RX ORDER — SODIUM CHLORIDE 9 MG/ML
INJECTION, SOLUTION INTRAVENOUS CONTINUOUS
Status: DISCONTINUED | OUTPATIENT
Start: 2022-12-05 | End: 2022-12-06 | Stop reason: HOSPADM

## 2022-12-05 RX ORDER — ENOXAPARIN SODIUM 100 MG/ML
30 INJECTION SUBCUTANEOUS ONCE
Status: COMPLETED | OUTPATIENT
Start: 2022-12-05 | End: 2022-12-05

## 2022-12-05 RX ORDER — LIDOCAINE 4 G/G
1 PATCH TOPICAL DAILY
Status: DISCONTINUED | OUTPATIENT
Start: 2022-12-05 | End: 2022-12-06 | Stop reason: HOSPADM

## 2022-12-05 RX ORDER — HYDROMORPHONE HYDROCHLORIDE 1 MG/ML
0.5 INJECTION, SOLUTION INTRAMUSCULAR; INTRAVENOUS; SUBCUTANEOUS
Status: DISCONTINUED | OUTPATIENT
Start: 2022-12-05 | End: 2022-12-06 | Stop reason: HOSPADM

## 2022-12-05 RX ORDER — SODIUM CHLORIDE 0.9 % (FLUSH) 0.9 %
5-40 SYRINGE (ML) INJECTION EVERY 12 HOURS SCHEDULED
Status: DISCONTINUED | OUTPATIENT
Start: 2022-12-05 | End: 2022-12-06 | Stop reason: HOSPADM

## 2022-12-05 RX ORDER — ROCURONIUM BROMIDE 10 MG/ML
INJECTION, SOLUTION INTRAVENOUS PRN
Status: DISCONTINUED | OUTPATIENT
Start: 2022-12-05 | End: 2022-12-05 | Stop reason: SDUPTHER

## 2022-12-05 RX ORDER — SODIUM CHLORIDE 9 MG/ML
INJECTION, SOLUTION INTRAVENOUS PRN
Status: DISCONTINUED | OUTPATIENT
Start: 2022-12-05 | End: 2022-12-06 | Stop reason: HOSPADM

## 2022-12-05 RX ORDER — KETAMINE HCL IN NACL, ISO-OSM 100MG/10ML
SYRINGE (ML) INJECTION PRN
Status: DISCONTINUED | OUTPATIENT
Start: 2022-12-05 | End: 2022-12-05 | Stop reason: SDUPTHER

## 2022-12-05 RX ORDER — HYDROMORPHONE HCL 110MG/55ML
PATIENT CONTROLLED ANALGESIA SYRINGE INTRAVENOUS PRN
Status: DISCONTINUED | OUTPATIENT
Start: 2022-12-05 | End: 2022-12-05 | Stop reason: SDUPTHER

## 2022-12-05 RX ORDER — SCOLOPAMINE TRANSDERMAL SYSTEM 1 MG/1
1 PATCH, EXTENDED RELEASE TRANSDERMAL
Status: DISCONTINUED | OUTPATIENT
Start: 2022-12-08 | End: 2022-12-06 | Stop reason: HOSPADM

## 2022-12-05 RX ORDER — HYDROMORPHONE HCL 110MG/55ML
0.5 PATIENT CONTROLLED ANALGESIA SYRINGE INTRAVENOUS EVERY 5 MIN PRN
Status: COMPLETED | OUTPATIENT
Start: 2022-12-05 | End: 2022-12-05

## 2022-12-05 RX ORDER — SCOLOPAMINE TRANSDERMAL SYSTEM 1 MG/1
1 PATCH, EXTENDED RELEASE TRANSDERMAL ONCE
Status: COMPLETED | OUTPATIENT
Start: 2022-12-05 | End: 2022-12-05

## 2022-12-05 RX ORDER — DEXAMETHASONE SODIUM PHOSPHATE 4 MG/ML
INJECTION, SOLUTION INTRA-ARTICULAR; INTRALESIONAL; INTRAMUSCULAR; INTRAVENOUS; SOFT TISSUE PRN
Status: DISCONTINUED | OUTPATIENT
Start: 2022-12-05 | End: 2022-12-05 | Stop reason: SDUPTHER

## 2022-12-05 RX ADMIN — Medication: at 10:29

## 2022-12-05 RX ADMIN — HYDROMORPHONE HYDROCHLORIDE 0.5 MG: 2 INJECTION, SOLUTION INTRAMUSCULAR; INTRAVENOUS; SUBCUTANEOUS at 10:10

## 2022-12-05 RX ADMIN — PHENYLEPHRINE HYDROCHLORIDE 200 MCG: 10 INJECTION INTRAVENOUS at 08:13

## 2022-12-05 RX ADMIN — Medication 10 MG: at 08:56

## 2022-12-05 RX ADMIN — PHENYLEPHRINE HYDROCHLORIDE 200 MCG: 10 INJECTION INTRAVENOUS at 07:53

## 2022-12-05 RX ADMIN — SODIUM CHLORIDE, PRESERVATIVE FREE 10 ML: 5 INJECTION INTRAVENOUS at 20:49

## 2022-12-05 RX ADMIN — HYDROMORPHONE HYDROCHLORIDE 0.5 MG: 2 INJECTION, SOLUTION INTRAMUSCULAR; INTRAVENOUS; SUBCUTANEOUS at 09:51

## 2022-12-05 RX ADMIN — PHENYLEPHRINE HYDROCHLORIDE 100 MCG: 10 INJECTION INTRAVENOUS at 07:48

## 2022-12-05 RX ADMIN — LIDOCAINE HYDROCHLORIDE 100 MG: 20 INJECTION, SOLUTION INFILTRATION; PERINEURAL at 07:42

## 2022-12-05 RX ADMIN — SUGAMMADEX 200 MG: 100 INJECTION, SOLUTION INTRAVENOUS at 09:26

## 2022-12-05 RX ADMIN — METOCLOPRAMIDE 10 MG: 5 INJECTION, SOLUTION INTRAMUSCULAR; INTRAVENOUS at 20:49

## 2022-12-05 RX ADMIN — Medication 30 MG: at 07:58

## 2022-12-05 RX ADMIN — DEXAMETHASONE SODIUM PHOSPHATE 8 MG: 4 INJECTION, SOLUTION INTRAMUSCULAR; INTRAVENOUS at 08:27

## 2022-12-05 RX ADMIN — ROCURONIUM BROMIDE 50 MG: 10 INJECTION, SOLUTION INTRAVENOUS at 07:42

## 2022-12-05 RX ADMIN — PHENYLEPHRINE HYDROCHLORIDE 100 MCG: 10 INJECTION INTRAVENOUS at 09:01

## 2022-12-05 RX ADMIN — PROPOFOL 200 MG: 10 INJECTION, EMULSION INTRAVENOUS at 07:42

## 2022-12-05 RX ADMIN — APREPITANT 80 MG: 80 CAPSULE ORAL at 06:45

## 2022-12-05 RX ADMIN — MIDAZOLAM 2 MG: 1 INJECTION INTRAMUSCULAR; INTRAVENOUS at 07:34

## 2022-12-05 RX ADMIN — Medication 20 MG: at 08:12

## 2022-12-05 RX ADMIN — ENOXAPARIN SODIUM 30 MG: 100 INJECTION SUBCUTANEOUS at 20:49

## 2022-12-05 RX ADMIN — ACETAMINOPHEN 650 MG: 650 SOLUTION ORAL at 06:45

## 2022-12-05 RX ADMIN — CEFAZOLIN 2000 MG: 2 INJECTION, POWDER, FOR SOLUTION INTRAMUSCULAR; INTRAVENOUS at 07:32

## 2022-12-05 RX ADMIN — ONDANSETRON 4 MG: 2 INJECTION INTRAMUSCULAR; INTRAVENOUS at 08:53

## 2022-12-05 RX ADMIN — SODIUM CHLORIDE, PRESERVATIVE FREE 40 MG: 5 INJECTION INTRAVENOUS at 16:01

## 2022-12-05 RX ADMIN — ONDANSETRON 4 MG: 2 INJECTION INTRAMUSCULAR; INTRAVENOUS at 15:26

## 2022-12-05 RX ADMIN — PHENYLEPHRINE HYDROCHLORIDE 50 MCG/MIN: 10 INJECTION INTRAVENOUS at 08:20

## 2022-12-05 RX ADMIN — PHENYLEPHRINE HYDROCHLORIDE 300 MCG: 10 INJECTION INTRAVENOUS at 08:09

## 2022-12-05 RX ADMIN — ENOXAPARIN SODIUM 30 MG: 100 INJECTION SUBCUTANEOUS at 06:45

## 2022-12-05 RX ADMIN — FENTANYL CITRATE 100 MCG: 50 INJECTION, SOLUTION INTRAMUSCULAR; INTRAVENOUS at 07:42

## 2022-12-05 RX ADMIN — SODIUM CHLORIDE, POTASSIUM CHLORIDE, SODIUM LACTATE AND CALCIUM CHLORIDE: 600; 310; 30; 20 INJECTION, SOLUTION INTRAVENOUS at 08:35

## 2022-12-05 RX ADMIN — HYDROMORPHONE HYDROCHLORIDE 0.5 MG: 2 INJECTION, SOLUTION INTRAMUSCULAR; INTRAVENOUS; SUBCUTANEOUS at 10:28

## 2022-12-05 RX ADMIN — HYDROMORPHONE HYDROCHLORIDE 1 MG: 2 INJECTION, SOLUTION INTRAMUSCULAR; INTRAVENOUS; SUBCUTANEOUS at 09:35

## 2022-12-05 RX ADMIN — SODIUM CHLORIDE, POTASSIUM CHLORIDE, SODIUM LACTATE AND CALCIUM CHLORIDE: 600; 310; 30; 20 INJECTION, SOLUTION INTRAVENOUS at 06:46

## 2022-12-05 RX ADMIN — HYDROMORPHONE HYDROCHLORIDE 0.5 MG: 2 INJECTION, SOLUTION INTRAMUSCULAR; INTRAVENOUS; SUBCUTANEOUS at 09:57

## 2022-12-05 RX ADMIN — PHENYLEPHRINE HYDROCHLORIDE 200 MCG: 10 INJECTION INTRAVENOUS at 07:56

## 2022-12-05 RX ADMIN — ONDANSETRON 4 MG: 2 INJECTION INTRAMUSCULAR; INTRAVENOUS at 20:48

## 2022-12-05 RX ADMIN — METOPROLOL TARTRATE 2.5 MG: 5 INJECTION, SOLUTION INTRAVENOUS at 20:49

## 2022-12-05 RX ADMIN — SODIUM CHLORIDE, POTASSIUM CHLORIDE, SODIUM LACTATE AND CALCIUM CHLORIDE: 600; 310; 30; 20 INJECTION, SOLUTION INTRAVENOUS at 10:35

## 2022-12-05 ASSESSMENT — PAIN SCALES - GENERAL
PAINLEVEL_OUTOF10: 3
PAINLEVEL_OUTOF10: 10
PAINLEVEL_OUTOF10: 2
PAINLEVEL_OUTOF10: 8
PAINLEVEL_OUTOF10: 3
PAINLEVEL_OUTOF10: 0
PAINLEVEL_OUTOF10: 0

## 2022-12-05 ASSESSMENT — PAIN DESCRIPTION - FREQUENCY: FREQUENCY: CONTINUOUS

## 2022-12-05 ASSESSMENT — PAIN DESCRIPTION - PAIN TYPE: TYPE: SURGICAL PAIN

## 2022-12-05 ASSESSMENT — PAIN DESCRIPTION - LOCATION
LOCATION: ABDOMEN

## 2022-12-05 ASSESSMENT — PAIN DESCRIPTION - DESCRIPTORS: DESCRIPTORS: SORE

## 2022-12-05 ASSESSMENT — PAIN - FUNCTIONAL ASSESSMENT: PAIN_FUNCTIONAL_ASSESSMENT: NONE - DENIES PAIN

## 2022-12-05 NOTE — ANESTHESIA PRE PROCEDURE
Department of Anesthesiology  Preprocedure Note       Name:  Arely Barrientos   Age:  48 y.o.  :  1975                                          MRN:  [id number]         Date:  2022      Surgeon: Yamil Jacobs): Anna Pealr MD    Procedure: Procedure(s):  LAPAROSCOPIC SLEEVE GASTRECTOMY AND POSSIBLE OTHER INDICATED PROCEDURES    Medications prior to admission:   Prior to Admission medications    Medication Sig Start Date End Date Taking?  Authorizing Provider   metoprolol succinate (TOPROL XL) 50 MG extended release tablet TAKE 1 TABLET NIGHTLY 22   Jeanie Hung, APRN - CNP   aspirin EC 81 MG EC tablet Take 1 tablet by mouth daily 10/18/22   Sriram Zuñigadox, DO   atorvastatin (LIPITOR) 40 MG tablet Take 1 tablet by mouth daily 10/18/22   Sriram Christianson, DO   omeprazole (PRILOSEC) 20 MG delayed release capsule Take 1 capsule by mouth every morning (before breakfast) 22   Anna Pearl MD   spironolactone (ALDACTONE) 25 MG tablet TAKE 1 TABLET DAILY 22   Jay Payne MD   vitamin D (CHOLECALCIFEROL) 84191 UNIT CAPS Take 1 capsule by mouth once a week 22  Anna Pearl MD   folic acid (V-R FOLIC ACID) 833 MCG tablet Take 1 tablet by mouth daily 22   Anna Pearl MD   lisinopril (PRINIVIL;ZESTRIL) 20 MG tablet TAKE 1 TABLET NIGHTLY 22   Jay Payne MD   levothyroxine (SYNTHROID) 100 MCG tablet TAKE 1 TABLET DAILY 22   Jay Payne MD       Current medications:    Current Facility-Administered Medications   Medication Dose Route Frequency Provider Last Rate Last Admin    lactated ringers infusion   IntraVENous Continuous Anna Pearl MD 50 mL/hr at 22 0646 New Bag at 22 0646    lidocaine PF 1 % injection 0.5 mL  0.5 mL IntraDERmal Once Anna Pearl MD        ceFAZolin (ANCEF) 2,000 mg in dextrose 5 % 50 mL IVPB (mini-bag)  2,000 mg IntraVENous On Call to Mag Billingsley MD        scopolamine (TRANSDERM-SCOP) transdermal patch 1 patch  1 patch TransDERmal Once Julia Machado MD   1 patch at 12/05/22 0645       Allergies: Allergies   Allergen Reactions    Nickel Other (See Comments)     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    Codeine Nausea Only, Rash and Nausea And Vomiting       Problem List:    Patient Active Problem List   Diagnosis Code    Essential hypertension I5    Hypothyroid E03.9    Right knee pain M25.561    Patellofemoral arthrosis M17.10    Localized osteoarthrosis, lower leg M17.10    Family history of malignant neoplasm of digestive organ Z80.0    Disease of both eyes characterized by increased eye pressure H40.053    Morbid obesity with BMI of 40.0-44.9, adult ([facility] [address] E66.01, Z68.41    Prediabetes R73.03    Chronic GERD K21.9    Mixed hyperlipidemia E78.2       Past Medical History:        Diagnosis Date    Chronic GERD 7/28/2022    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 Ask me about.  Hypertension     Localized osteoarthrosis, lower leg 2/17/2016    Mixed hyperlipidemia 8/18/2022    Obesity     Thyroid disease        Past Surgical History:        Procedure Laterality Date    BREAST BIOPSY      COLONOSCOPY  2006    Ask me about.     UPPER GASTROINTESTINAL ENDOSCOPY N/A 9/23/2022    EGD BIOPSY performed by Julia Machado MD at [address] [facility]  [address] History:    Social History     Tobacco Use    Smoking status: Never    Smokeless tobacco: Never   Substance Use Topics    Alcohol use: Not Currently     Alcohol/week: 21.0 standard drinks     Types: 15 Cans of beer, 6 Shots of liquor per week     Comment: 3 days                                Counseling given: Not Answered      Vital Signs (Current):   Vitals:    11/18/22 1415 12/05/22 0700   BP:  118/71   Pulse:  72   Resp:  16   Temp:  97.1 °F (36.2 °C)   TempSrc:  Temporal   SpO2:  98%   Weight: 240 lb (108.9 kg) 227 lb (103 kg)   Height: 5' 4\" (1.626 m) BP Readings from Last 3 Encounters:   12/05/22 118/71   11/17/22 118/72   10/27/22 116/68       NPO Status: Time of last liquid consumption: 2300                        Time of last solid consumption: 1800                        Date of last liquid consumption: 12/04/22                        Date of last solid food consumption: 12/03/22    BMI:   Wt Readings from Last 3 Encounters:   12/05/22 227 lb (103 kg)   11/17/22 240 lb 3.2 oz (109 kg)   11/08/22 236 lb (107 kg)     Body mass index is 38.96 kg/m². CBC:   Lab Results   Component Value Date/Time    WBC 8.5 11/25/2022 02:30 PM    RBC 5.47 11/25/2022 02:30 PM    HGB 14.0 11/25/2022 02:30 PM    HCT 42.7 11/25/2022 02:30 PM    MCV 78.2 11/25/2022 02:30 PM    RDW 13.3 11/25/2022 02:30 PM     11/25/2022 02:30 PM       CMP:   Lab Results   Component Value Date/Time     11/25/2022 02:30 PM    K 4.9 11/25/2022 02:30 PM    CL 96 11/25/2022 02:30 PM    CO2 24 11/25/2022 02:30 PM    BUN 24 11/25/2022 02:30 PM    CREATININE 0.8 11/25/2022 02:30 PM    GFRAA >60 08/08/2022 01:53 PM    GFRAA >60 03/05/2013 08:12 AM    AGRATIO 1.6 11/25/2022 02:30 PM    LABGLOM >60 11/25/2022 02:30 PM    GLUCOSE 87 11/25/2022 02:30 PM    PROT 7.3 11/25/2022 02:30 PM    PROT 7.0 03/05/2013 08:12 AM    CALCIUM 10.4 11/25/2022 02:30 PM    BILITOT 0.5 11/25/2022 02:30 PM    ALKPHOS 110 11/25/2022 02:30 PM    AST 15 11/25/2022 02:30 PM    ALT 21 11/25/2022 02:30 PM       POC Tests:   Recent Labs     12/05/22  0654   POCGLU 104*       Coags:   Lab Results   Component Value Date/Time    PROTIME 13.5 08/08/2022 01:53 PM    INR 1.04 08/08/2022 01:53 PM       HCG (If Applicable):   Lab Results   Component Value Date    PREGTESTUR Negative 09/23/2022        ABGs: No results found for: PHART, PO2ART, QFC5YNK, ZIV4FXV, BEART, N2PFUERM     Type & Screen (If Applicable):  No results found for: LABABO, LABRH    Drug/Infectious Status (If Applicable):   No results found for: HIV, HEPCAB    COVID-19 Screening (If Applicable): No results found for: COVID19        Anesthesia Evaluation  Patient summary reviewed and Nursing notes reviewed no history of anesthetic complications:   Airway: Mallampati: II  TM distance: >3 FB   Neck ROM: full  Mouth opening: > = 3 FB   Dental: normal exam         Pulmonary: breath sounds clear to auscultation      (-) COPD and asthma                           Cardiovascular:  Exercise tolerance: good (>4 METS),   (+) hypertension:,     (-) CABG/stent, dysrhythmias and  angina      Rhythm: regular  Rate: normal                    Neuro/Psych:      (-) TIA and CVA           GI/Hepatic/Renal:   (+) GERD (denies symptoms today):,           Endo/Other:    (+) hypothyroidism::., .                 Abdominal:   (+) obese,           Vascular:     - DVT and PE. Other Findings:           Anesthesia Plan      general     ASA 3       Induction: intravenous and rapid sequence. MIPS: Postoperative opioids intended and Prophylactic antiemetics administered. Anesthetic plan and risks discussed with patient. Plan discussed with CRNA.                     Oscar Sal MD   12/5/2022

## 2022-12-05 NOTE — PROGRESS NOTES
Phase I discharge criteria met. VSS, O2 sats 95% on 2L NC. Abdominal incisions approximated. Pt holding in pacu awaiting room availability on 4T.

## 2022-12-05 NOTE — OP NOTE
Operative Note      Patient: Eligio Preston  YOB: 1975  MRN: [id number]    Date of Procedure: 12/5/2022  [facility]) Physicians   [facility]  [address] [place] [id number] . Phone: [phone]  Fax: [phone]             Procedure Note    Indications: The patient was evaluated by our multidisciplinary team and was found to be a good candidate for weight loss surgery for future weight loss. Body mass index is 38.96 kg/m². Valri Dykes Risks and benefits explained and Eligio Preston wants to proceed. Pre-operative Diagnosis:   Patient Active Problem List   Diagnosis    Essential hypertension    Hypothyroid    Right knee pain    Patellofemoral arthrosis    Localized osteoarthrosis, lower leg    Family history of malignant neoplasm of digestive organ    Disease of both eyes characterized by increased eye pressure    Morbid obesity with BMI of 40.0-44.9, adult ([address]    Prediabetes    Chronic GERD    Mixed hyperlipidemia    Severe obesity (BMI 35.0-39. 9) with comorbidity ([address]    Metabolic syndrome    Hiatal hernia    Ventral hernia without obstruction or gangrene         Post-operative Diagnosis:   Same      Procedure:    - Laparoscopic Sleeve Gastrectomy. - Laparoscopic 1ry Ventral hernia Repair. Incarcerated. - Laparoscopic Hiatal Hernia Repair . Surgeon: Myles Templeton MD, FACS. Anesthesia: General endotracheal anesthesia        Procedure Details   The patient was seen again in the Holding Room. The risks, benefits, complications, treatment options, and expected outcomes were discussed with the patient and/or family. Including but not limited to;  The possibilities of reaction to medication, pulmonary aspiration, perforation of viscus, bleeding, recurrent infection, strictures, leaks, failure to lose weight, regaining weight,  malnutrition, the need for additional procedures, failure to diagnose a condition, and creating a complication requiring transfusion or operation were discussed. There was concurrence with the proposed plan and informed consent was obtained. The site of surgery was properly noted/marked. The patient was taken to Operating Room, identified as Claudia Diaz and the procedure verified as Laparoscopic Sleeve Gastrectomy & other indicated procedures. A Time Out was held and the above information confirmed. The patient was placed in the supine position and general anesthesia was induced, along with placement of orogastric tube, Venodyne boots. Lovenox SQ, Emend and IV Antibiotics given pre-operatively. All pressure points were padded properly. Patient prepped and draped in sterile fashion. A left upper quadrant incision was made and a veres needle was inserted after confirming with saline drop test.  After adequate pneumoperitoneum was obtained, a 5 mm trocar was inserted. This was followed by a endoscope which confirmed intra-abdominal placement and there was no injury on initial trocar placement. Additional ports were placed in the standard positions under direct vision. The abdomen was initially explored and noted an incarcerated ventral hernia with omentum in the hernia in the periumbilical region. Patient also had a hiatal hernia. A liver retractor was inserted through a small incision in the upper midline, lifted the liver upward, and was then secured to the OR table. The pylorus was identified and measurement was taken approximately 4-6 cm from the pylorus along the greater curvature of the stomach. The harmonic scalpel / ligasure was used to take down the attachments and short gastric vessels along the greater curve of the stomach. This was continued until all attachments were taken down and continued to the gastro-esophageal junction. A 34 Somali dilator was placed along the lesser curvature and into the pylorus.       A hiatal hernia was identified and decision was made to repair this to help improve patient's GERD and ensure sleeve would not migrate into the chest.  Began the 360-degree dissection by dividing the pars flaccida and mobilizing the esophagus at the right mariano, identifying the junction with the left mariano lateral to the esophagus. I then divided the phrenoesophageal membrane and the esophagus was freed from both crura and hiatal hernia sac was completely dissected off of the distal esophagus. The dissection was done high into the mediastinum, identifying the aorta as well as the inferior pulmonary vein with complete reduction. The hiatal hernia sac was completely freed off of the left and right parietal pleura and excised. The esophagus was now mobilized at the hiatus and hernia sac was estimated to be about 1.5 cm. The crura was then closed around the esophagus, anterior and posterior closure when feasible, with multiple interrupted 0.0 ethibond. There was approximately 4 cm of Intraabdominal esophagus. Vagus nerves were protected and away from dissection. Made sure it was not too tight on the esophagus and the Boogie passed easily through it. Patient also had a left diaphragmatic separation of the fibers that was also approximated with 3 interrupted Ethibond sutures. Hemostasis was confirmed. A stapler was fired along the dilator to create an appropriate sized gastric sleeve pouch. Purple loads followed by Reynaga loads were used to create the sleeve. The staple line looked very healthy and no bleeding from staple line. The stomach was confirmed to be completely divided with uniform shape,  no twist in the sleeve and wide patent incisura. A 2-0 vicryl suture was used to over-sew and imbricate the sleeve staple line. The staple line was completely over-sewn. The dilator was removed and an Edlich tube was advanced across the sleeve to ensure patency mainly at incisura, then retracted to just above the GE junction.   The stomach distal to the staple line was clamped and methylene blue saline was injected under pressure confirming No obstruction (flow noted to duodenum) and No leak. Then we turned our attention to the patient's ventral hernia, hernia was 3 cms, it was felt necessary to repair to prevent any future bowel incarceration or enlargement. Omentum was incarcerated in it. Carefully using the harmonic scalpel the omentum was excised to free the edges of the hernia. Made sure that Hernia edges were free. Local anesthetic used to infiltrate around the hernia site. Through separate incisions, stab incisions were made using 11 blade, then using tamika shahid needle, with laparoscopic guidance and using the laparoscopic jarrod grasper to retreive sutures, a total of three 0.0 Ethibond stitches were passed under direct visualization and used to close the defect. Pneumoperitoneum removed and stitches tied , then repair was inspected after re-applying pneumoperitoneum and it was intact. The abdomen was carefully inspected and there was no bleeding or any other abnormality. The stomach was brought out through the RUQ incision. Hemostasis was confirmed. Snow and evaseal applied along suture line and/or biopsy sites to ensure hemostasis. The 15 and 12 port sites was closed using 0.0 Vicryl  suture at the level of the fascia and that was done under direct vision with the laparoscope to ensure proper closure and nothing entrapped. Local Anesthetic used at port sites. The trocar site skin wounds were closed using 4.0 Vicryl after copious Irrigation of the wounds. Dermabond / or steri strips applied. Instrument, sponge, and needle counts were correct at the conclusion of the case. Findings:  As above. Estimated Blood Loss:  Minimal           Drains: none           Total IV Fluids: 1500 ml           Specimens: Stomach (Subtotal)            Complications:  None; patient tolerated the procedure well.            Disposition: PACU - hemodynamically stable. Condition: stable    Attending Attestation: I was present and scrubbed for the entire procedure.       Electronically signed by Christianne Walker MD , FACS, [facility]  on 12/5/2022 at 9:28 AM

## 2022-12-05 NOTE — PROGRESS NOTES
Incentive Spirometry education and demonstration completed by Respiratory Therapy Yes      Response to education: Fair     Teaching Time: 5 minutes    Minimum Predicted Vital Capacity - 547 mL. Patient's Actual Vital Capacity - 500 mL. Turning over to Nursing for routine follow-up No.    Comments:  Will continue to teach/ until goal is met    Electronically signed by Brent Hammond RCP on 12/5/2022 at 6:36 PM

## 2022-12-05 NOTE — H&P
[facility]) Physicians   [employer]  La Thomason MD, [facility], [address] [facility], [address] [place] [id number] . Phone: [phone]  Fax: [phone]            Patient's History and Physical from November 17, 2022 was reviewed. Lori Muller seen and examined. There has been no change. HISTORY OF PRESENT ILLNESS:    Jeramy Adam is a very pleasant [address] y.o. with Body mass index is 38.96 kg/m². who is presenting for planned Laparoscopic Sleeve Gastrectomy for future weight loss. Past Medical History:        Diagnosis Date    Chronic GERD 7/28/2022    Disease of both eyes characterized by increased eye pressure 4/23/2018    Glaucoma     Have a Cupped Optic Nerve. Ask me about. Hypertension     Localized osteoarthrosis, lower leg 2/17/2016    Mixed hyperlipidemia 8/18/2022    Obesity     Thyroid disease      Past Surgical History:        Procedure Laterality Date    BREAST BIOPSY      COLONOSCOPY  2006    Ask me about. UPPER GASTROINTESTINAL ENDOSCOPY N/A 9/23/2022    EGD BIOPSY performed by Nicholas Santiago MD at [address] [place] [address]     Medications Prior to Admission:   Medications Prior to Admission: metoprolol succinate (TOPROL XL) 50 MG extended release tablet, TAKE 1 TABLET NIGHTLY  aspirin EC 81 MG EC tablet, Take 1 tablet by mouth daily  atorvastatin (LIPITOR) 40 MG tablet, Take 1 tablet by mouth daily  omeprazole (PRILOSEC) 20 MG delayed release capsule, Take 1 capsule by mouth every morning (before breakfast)  spironolactone (ALDACTONE) 25 MG tablet, TAKE 1 TABLET DAILY  vitamin D (CHOLECALCIFEROL) 37970 UNIT CAPS, Take 1 capsule by mouth once a week  folic acid (V-R FOLIC ACID) 767 MCG tablet, Take 1 tablet by mouth daily  lisinopril (PRINIVIL;ZESTRIL) 20 MG tablet, TAKE 1 TABLET NIGHTLY  levothyroxine (SYNTHROID) 100 MCG tablet, TAKE 1 TABLET DAILY    Allergies:  Nickel and Codeine    Social History:   TOBACCO:   reports that she has never smoked.  She has never used smokeless tobacco.  ETOH:   reports that she does not currently use alcohol after a past usage of about 21.0 standard drinks per week. Family History:       Problem Relation Age of Onset    Arthritis Mother         Rheumatoid    Cancer Mother         Colon Cancer    Heart Disease Father         Quad Bypass    High Blood Pressure Father     Brain Cancer Sister 61    Heart Disease Maternal Grandmother 52    Heart Disease Maternal Grandfather     Emphysema Paternal Grandmother     Heart Disease Paternal Grandfather     Cancer Sister         Brain Tumor/ Cancer    Early Death Sister         Age 35         REVIEW OF SYSTEMS:    Review of Systems - History obtained from the patient  General ROS: obesity  Psychological ROS: negative  Ophthalmic ROS: negative  Neurological ROS: negative  ENT ROS: negative  Allergy and Immunology ROS: negative  Hematological and Lymphatic ROS: negative  Endocrine ROS: negative  Breast ROS: negative  Respiratory ROS: negative  Cardiovascular ROS: negative  Gastrointestinal ROS:negative  Genito-Urinary ROS: negative  Musculoskeletal ROS: negative   Skin ROS: negative      PHYSICAL EXAM:      /71   Pulse 72   Temp 97.1 °F (36.2 °C) (Temporal)   Resp 16   Ht 5' 4\" (1.626 m)   Wt 227 lb (103 kg)   SpO2 98%   BMI 38.96 kg/m²  I        Physical Exam   Vitals Reviewed   Constitutional: Patient is oriented to person, place, and time. Patient appears well-developed and well-nourished. Patient is active and cooperative. Non-toxic appearance. No distress. HENT:   Head: Normocephalic and atraumatic. Head is without abrasion and without laceration. Hair is normal.   Right Ear: External ear normal. No lacerations. No drainage, swelling . Left Ear: External ear normal. No lacerations. No drainage, swelling. Nose: Nose normal. No nose lacerations or nasal deformity. Eyes: Conjunctivae, EOM and lids are normal. Right eye exhibits no discharge.  No foreign body present in the right eye. Left eye exhibits no discharge. No foreign body present in the left eye. No scleral icterus. Neck: Trachea normal and normal range of motion. No JVD present. Pulmonary/Chest: Effort normal. No accessory muscle usage or stridor. No apnea. No respiratory distress. Cardiovascular: Normal rate and no JVD. Abdominal: Normal appearance. Patient exhibits no distension. Musculoskeletal: Normal range of motion. Patient exhibits no edema. Neurological: Patient is alert and oriented to person, place, and time. Patient has normal strength. GCS eye subscore is 4. GCS verbal subscore is 5. GCS motor subscore is 6. Skin: Skin is warm and dry. No abrasion and no rash noted. Patient is not diaphoretic. No cyanosis or erythema. Psychiatric: Patient has a normal mood and affect.  Speech is normal and behavior is normal. Cognition and memory are normal.       DATA:  CBC:   Lab Results   Component Value Date/Time    WBC 8.5 11/25/2022 02:30 PM    RBC 5.47 11/25/2022 02:30 PM    HGB 14.0 11/25/2022 02:30 PM    HCT 42.7 11/25/2022 02:30 PM    MCV 78.2 11/25/2022 02:30 PM    MCH 25.7 11/25/2022 02:30 PM    MCHC 32.8 11/25/2022 02:30 PM    RDW 13.3 11/25/2022 02:30 PM     11/25/2022 02:30 PM    MPV 8.9 11/25/2022 02:30 PM     CMP:    Lab Results   Component Value Date/Time     11/25/2022 02:30 PM    K 4.9 11/25/2022 02:30 PM    CL 96 11/25/2022 02:30 PM    CO2 24 11/25/2022 02:30 PM    BUN 24 11/25/2022 02:30 PM    CREATININE 0.8 11/25/2022 02:30 PM    GFRAA >60 08/08/2022 01:53 PM    GFRAA >60 03/05/2013 08:12 AM    AGRATIO 1.6 11/25/2022 02:30 PM    LABGLOM >60 11/25/2022 02:30 PM    GLUCOSE 87 11/25/2022 02:30 PM    PROT 7.3 11/25/2022 02:30 PM    PROT 7.0 03/05/2013 08:12 AM    LABALBU 4.5 11/25/2022 02:30 PM    CALCIUM 10.4 11/25/2022 02:30 PM    BILITOT 0.5 11/25/2022 02:30 PM    ALKPHOS 110 11/25/2022 02:30 PM    AST 15 11/25/2022 02:30 PM    ALT 21 11/25/2022 02:30 PM       ASSESSMENT AND PLAN:      Samantha Guerrero is a 48 y.o. female with Body mass index is 38.96 kg/m². ,  patient is deemed appropriate candidate for weight loss surgery by our multidisciplinary team.       Following The Metabolic and Bariatric Surgery Accreditation and Quality Improvement Program [facility]), and [employer] of [employer] (ACS) recommendations, the Bariatric Surgical Risk/Benefit Calculator was used for Samantha Guerrero. Report was discussed with Linda Daniels and patient wishes to proceed with surgery, fully understanding the risks and benefits. Of note, The [facility] Bariatric Surgical Risk/Benefit Calculator estimates the chance of an unfavorable outcome (such as a complication or death), the chance of remission of weight-related comorbidities, and the patient's BMI, weight change, and percent total weight change after surgery. These quantities are estimated based upon information the patient gives the healthcare provider about prior health history. The estimates are calculated using data from a large number of patients who had a primary bariatric surgical procedure similar to the one the patient may have. Please note the risk percentages, remission percentages, BMI, weight change, and percent total weight change provided to you by the risk/benefit calculator are only estimates. These estimates only take certain information into account. There may be other factors that are not included in the estimate which may increase or decrease the risk of a complication, the chance of remission of a weight-related comorbidity, or the amount of weight the patient loses. These estimates are not a guarantee of results. A complication after surgery may happen even if the risk is low, a weight-related comorbidity may not go into remission even if the chances are high, and a patient may lose more or less weight than predicted.  This information is not intended to replace the advice of a doctor or healthcare provider about the diagnosis, treatment, or potential outcomes. The Provider is not responsible for medical decisions that may be made by the patient based on the risk/benefit calculator estimates, since these estimates are provided for informational purposes. Patients should always consult their doctor or other health care provider before deciding on a treatment plan. Both open and laparoscopic approach were explained in details. Benefits and risks explained. Informed consent obtained. Risks including but not limited to; Infection, bleeding, gastric leak or obstruction, persistent nausea, vomiting, or reflux, injury to surrounding structures, risks of anesthesia, stricture, delayed gastric emptying, staple line leak, incisional hernia, malnutrition , hair loss, and/ or Conversion to Open surgery may be necessary. Failure to lose or maintain weight loss, Gallstones or Kidney Stones, Deep Venous Thrombosis , pulmonary embolism and / or death. With obesity and/or Fatty liver , I may elect to perform liver core biopsy to have  Baseline idea on liver pathology if there is abnormal Liver Functions or if there is hepatomegaly &/or lesion, risks include but not limited to bile leak, liver hematoma or failure to diagnose a condition. Deana George understands that there may be a need to perform other urgent or necessary procedures that were unanticipated. Alpha Sat consent to the performance of any additional procedures determined during the original procedure to be in their best interests and where delay might cause additional harm or put patient at risk for additional anesthesia risk for required by a second procedure and that will be determined by the surgeon. I did explain thoroughly to the patient that compliance with pre- and post op diet and other recommendations are integral part to improve the chances of successful weight loss and also not following it could end with serious health complications.      We discussed that our goal is to ameliorate the medical problems and not to obtain a specific body mass index. Patient understands the risks and benefits and wishes to proceed with the procedure. Also understands if BMI is lower than 40 without significant co morbid conditions, concerns for risks of surgery being somewhat higher over long run, however patient wants to proceed and fully understands the risks. Clearly BMI over 35 does impose very serious health risks as well chances of losing weight on diet only is very limited and sustaining weight loss is even less, thus surgery is certainly recommended for long term weight loss and better health overall given compliance. I advised the patient that we can't guarantee final insurance approval.      The patient was counseled at length about the risks of suzy Covid-19 during their perioperative period and any recovery window from their procedure. The patient was made aware that suzy Covid-19  may worsen their prognosis for recovering from their procedure  and lend to a higher morbidity and/or mortality risk. All material risks, benefits, and reasonable alternatives including postponing the procedure were discussed. The patient does wish to proceed with the procedure at this time. 1.  Plan for Laparoscopic Sleeve Gastrectomy with General Anesthesia.           Electronically signed by Sylvie Rose MD , FACS, FASMBS on 12/5/2022 at 7:20 AM

## 2022-12-05 NOTE — PROGRESS NOTES
Nursing care provided to prep patient for surgery. Patient lost 9 lbs. on pre-op diet, informed surgeon. Pre-op orders completed. Bilateral foot pneumatic sleeves applied. Teaching / education initiated regarding perioperative experience, post-op expectations and plan of care, and pain management during hospital stay. Patient verbalized understanding. The care plan and education has been reviewed and mutually agreed upon with the patient.

## 2022-12-05 NOTE — ANESTHESIA POSTPROCEDURE EVALUATION
Department of Anesthesiology  Postprocedure Note    Patient: Samantha Guerrero  MRN: [id number]  YOB: 1975  Date of evaluation: 12/5/2022      Procedure Summary     Date: 12/05/22 Room / Location: [address]    Anesthesia Start: 4008 Anesthesia Stop: 2420    Procedures:       LAPAROSCOPIC SLEEVE GASTRECTOMY (Abdomen)      LAPAROSCOPIC HIATAL HERNIA REPAIR (Abdomen)      LAPAROSCOPIC UMBILICAL HERNIA REPAIR (Abdomen) Diagnosis:       Morbid obesity ([address]      (Morbid obesity ([address] [E66.01])    Surgeons: Wetzel Schilder, MD Responsible Provider: Chino Pennington MD    Anesthesia Type: general ASA Status: 3          Anesthesia Type: No value filed.     Anel Phase I: Anel Score: 9    Anel Phase II:        Anesthesia Post Evaluation    Patient location during evaluation: PACU  Patient participation: complete - patient participated  Level of consciousness: awake  Airway patency: patent  Nausea & Vomiting: no vomiting  Complications: no  Cardiovascular status: hemodynamically stable  Respiratory status: acceptable  Hydration status: euvolemic  Multimodal analgesia pain management approach

## 2022-12-06 ENCOUNTER — APPOINTMENT (OUTPATIENT)
Dept: GENERAL RADIOLOGY | Age: 47
DRG: 620 | End: 2022-12-06
Attending: SURGERY
Payer: COMMERCIAL

## 2022-12-06 VITALS
WEIGHT: 227 LBS | BODY MASS INDEX: 38.76 KG/M2 | RESPIRATION RATE: 16 BRPM | HEART RATE: 72 BPM | OXYGEN SATURATION: 98 % | TEMPERATURE: 97.4 F | DIASTOLIC BLOOD PRESSURE: 85 MMHG | HEIGHT: 64 IN | SYSTOLIC BLOOD PRESSURE: 124 MMHG

## 2022-12-06 PROBLEM — E87.5 HYPERKALEMIA: Status: ACTIVE | Noted: 2022-12-06

## 2022-12-06 PROBLEM — E87.5 HYPERKALEMIA: Status: RESOLVED | Noted: 2022-12-06 | Resolved: 2022-12-06

## 2022-12-06 LAB
ANION GAP SERPL CALCULATED.3IONS-SCNC: 10 MMOL/L (ref 3–16)
ANION GAP SERPL CALCULATED.3IONS-SCNC: 6 MMOL/L (ref 3–16)
BUN BLDV-MCNC: 10 MG/DL (ref 7–20)
BUN BLDV-MCNC: 11 MG/DL (ref 7–20)
CALCIUM SERPL-MCNC: 9.8 MG/DL (ref 8.3–10.6)
CALCIUM SERPL-MCNC: 9.9 MG/DL (ref 8.3–10.6)
CHLORIDE BLD-SCNC: 102 MMOL/L (ref 99–110)
CHLORIDE BLD-SCNC: 99 MMOL/L (ref 99–110)
CO2: 28 MMOL/L (ref 21–32)
CO2: 28 MMOL/L (ref 21–32)
CREAT SERPL-MCNC: 0.7 MG/DL (ref 0.6–1.1)
CREAT SERPL-MCNC: 0.8 MG/DL (ref 0.6–1.1)
GFR SERPL CREATININE-BSD FRML MDRD: >60 ML/MIN/{1.73_M2}
GFR SERPL CREATININE-BSD FRML MDRD: >60 ML/MIN/{1.73_M2}
GLUCOSE BLD-MCNC: 111 MG/DL (ref 70–99)
GLUCOSE BLD-MCNC: 96 MG/DL (ref 70–99)
HCT VFR BLD CALC: 36.2 % (ref 36–48)
HEMOGLOBIN: 12.2 G/DL (ref 12–16)
MCH RBC QN AUTO: 25.8 PG (ref 26–34)
MCHC RBC AUTO-ENTMCNC: 33.7 G/DL (ref 31–36)
MCV RBC AUTO: 76.6 FL (ref 80–100)
PDW BLD-RTO: 13.3 % (ref 12.4–15.4)
PLATELET # BLD: 232 K/UL (ref 135–450)
PMV BLD AUTO: 8.7 FL (ref 5–10.5)
POTASSIUM SERPL-SCNC: 4 MMOL/L (ref 3.5–5.1)
POTASSIUM SERPL-SCNC: 5.5 MMOL/L (ref 3.5–5.1)
RBC # BLD: 4.72 M/UL (ref 4–5.2)
SODIUM BLD-SCNC: 136 MMOL/L (ref 136–145)
SODIUM BLD-SCNC: 137 MMOL/L (ref 136–145)
WBC # BLD: 8.6 K/UL (ref 4–11)

## 2022-12-06 PROCEDURE — 36415 COLL VENOUS BLD VENIPUNCTURE: CPT

## 2022-12-06 PROCEDURE — 6370000000 HC RX 637 (ALT 250 FOR IP): Performed by: SURGERY

## 2022-12-06 PROCEDURE — 6360000004 HC RX CONTRAST MEDICATION: Performed by: SURGERY

## 2022-12-06 PROCEDURE — 6370000000 HC RX 637 (ALT 250 FOR IP): Performed by: NURSE PRACTITIONER

## 2022-12-06 PROCEDURE — 2500000003 HC RX 250 WO HCPCS: Performed by: SURGERY

## 2022-12-06 PROCEDURE — 85027 COMPLETE CBC AUTOMATED: CPT

## 2022-12-06 PROCEDURE — 6360000002 HC RX W HCPCS: Performed by: NURSE PRACTITIONER

## 2022-12-06 PROCEDURE — 74240 X-RAY XM UPR GI TRC 1CNTRST: CPT

## 2022-12-06 PROCEDURE — C9113 INJ PANTOPRAZOLE SODIUM, VIA: HCPCS | Performed by: SURGERY

## 2022-12-06 PROCEDURE — 6360000002 HC RX W HCPCS: Performed by: SURGERY

## 2022-12-06 PROCEDURE — 80048 BASIC METABOLIC PNL TOTAL CA: CPT

## 2022-12-06 PROCEDURE — 2580000003 HC RX 258: Performed by: NURSE PRACTITIONER

## 2022-12-06 PROCEDURE — 2580000003 HC RX 258: Performed by: SURGERY

## 2022-12-06 RX ORDER — OXYCODONE HYDROCHLORIDE AND ACETAMINOPHEN 5; 325 MG/1; MG/1
1 TABLET ORAL EVERY 6 HOURS PRN
Qty: 28 TABLET | Refills: 0 | Status: SHIPPED | OUTPATIENT
Start: 2022-12-06 | End: 2022-12-13

## 2022-12-06 RX ORDER — LISINOPRIL 20 MG/1
20 TABLET ORAL DAILY
Status: DISCONTINUED | OUTPATIENT
Start: 2022-12-06 | End: 2022-12-06 | Stop reason: HOSPADM

## 2022-12-06 RX ORDER — HYOSCYAMINE SULFATE 0.125 MG
125 TABLET ORAL EVERY 4 HOURS PRN
Qty: 30 TABLET | Refills: 3 | Status: SHIPPED | OUTPATIENT
Start: 2022-12-06 | End: 2022-12-06 | Stop reason: SDUPTHER

## 2022-12-06 RX ORDER — FUROSEMIDE 10 MG/ML
20 INJECTION INTRAMUSCULAR; INTRAVENOUS ONCE
Status: COMPLETED | OUTPATIENT
Start: 2022-12-06 | End: 2022-12-06

## 2022-12-06 RX ORDER — OXYCODONE HYDROCHLORIDE AND ACETAMINOPHEN 5; 325 MG/1; MG/1
2 TABLET ORAL EVERY 4 HOURS PRN
Status: DISCONTINUED | OUTPATIENT
Start: 2022-12-06 | End: 2022-12-06 | Stop reason: HOSPADM

## 2022-12-06 RX ORDER — ONDANSETRON 8 MG/1
8 TABLET, ORALLY DISINTEGRATING ORAL EVERY 8 HOURS PRN
Qty: 30 TABLET | Refills: 2 | Status: SHIPPED | OUTPATIENT
Start: 2022-12-06

## 2022-12-06 RX ORDER — HYOSCYAMINE SULFATE 0.125 MG
125 TABLET ORAL EVERY 4 HOURS PRN
Qty: 30 TABLET | Refills: 3 | Status: SHIPPED | OUTPATIENT
Start: 2022-12-06

## 2022-12-06 RX ORDER — ONDANSETRON 8 MG/1
8 TABLET, ORALLY DISINTEGRATING ORAL EVERY 8 HOURS PRN
Qty: 30 TABLET | Refills: 0 | Status: SHIPPED | OUTPATIENT
Start: 2022-12-06

## 2022-12-06 RX ORDER — METOPROLOL SUCCINATE 50 MG/1
50 TABLET, EXTENDED RELEASE ORAL DAILY
Status: DISCONTINUED | OUTPATIENT
Start: 2022-12-06 | End: 2022-12-06 | Stop reason: HOSPADM

## 2022-12-06 RX ORDER — PROMETHAZINE HYDROCHLORIDE 6.25 MG/5ML
12.5 SYRUP ORAL EVERY 6 HOURS PRN
Status: DISCONTINUED | OUTPATIENT
Start: 2022-12-06 | End: 2022-12-06 | Stop reason: HOSPADM

## 2022-12-06 RX ORDER — ONDANSETRON 4 MG/1
8 TABLET, ORALLY DISINTEGRATING ORAL EVERY 8 HOURS PRN
Status: DISCONTINUED | OUTPATIENT
Start: 2022-12-06 | End: 2022-12-06 | Stop reason: HOSPADM

## 2022-12-06 RX ORDER — OXYCODONE HYDROCHLORIDE AND ACETAMINOPHEN 5; 325 MG/1; MG/1
1 TABLET ORAL EVERY 4 HOURS PRN
Status: DISCONTINUED | OUTPATIENT
Start: 2022-12-06 | End: 2022-12-06 | Stop reason: HOSPADM

## 2022-12-06 RX ORDER — SODIUM CHLORIDE 9 MG/ML
INJECTION, SOLUTION INTRAVENOUS CONTINUOUS
Status: DISCONTINUED | OUTPATIENT
Start: 2022-12-06 | End: 2022-12-06 | Stop reason: HOSPADM

## 2022-12-06 RX ADMIN — SODIUM CHLORIDE, PRESERVATIVE FREE 10 ML: 5 INJECTION INTRAVENOUS at 10:21

## 2022-12-06 RX ADMIN — SODIUM CHLORIDE: 9 INJECTION, SOLUTION INTRAVENOUS at 08:21

## 2022-12-06 RX ADMIN — SODIUM CHLORIDE, PRESERVATIVE FREE 40 MG: 5 INJECTION INTRAVENOUS at 10:20

## 2022-12-06 RX ADMIN — FUROSEMIDE 20 MG: 10 INJECTION, SOLUTION INTRAMUSCULAR; INTRAVENOUS at 10:20

## 2022-12-06 RX ADMIN — OXYCODONE AND ACETAMINOPHEN 2 TABLET: 5; 325 TABLET ORAL at 15:19

## 2022-12-06 RX ADMIN — ONDANSETRON 4 MG: 2 INJECTION INTRAMUSCULAR; INTRAVENOUS at 10:20

## 2022-12-06 RX ADMIN — METOPROLOL TARTRATE 2.5 MG: 5 INJECTION, SOLUTION INTRAVENOUS at 10:21

## 2022-12-06 RX ADMIN — IOHEXOL 50 ML: 350 INJECTION, SOLUTION INTRAVENOUS at 08:56

## 2022-12-06 RX ADMIN — ENOXAPARIN SODIUM 30 MG: 100 INJECTION SUBCUTANEOUS at 10:20

## 2022-12-06 ASSESSMENT — PAIN SCALES - GENERAL
PAINLEVEL_OUTOF10: 2
PAINLEVEL_OUTOF10: 5
PAINLEVEL_OUTOF10: 3
PAINLEVEL_OUTOF10: 3
PAINLEVEL_OUTOF10: 4

## 2022-12-06 ASSESSMENT — PAIN DESCRIPTION - LOCATION
LOCATION: ABDOMEN

## 2022-12-06 NOTE — DISCHARGE INSTRUCTIONS
Unless otherwise noted you will crush all your medications for the first 2 weeks post op and mix them with clears for the first 2 days and then with your pureed food for the remainder of the 2 weeks. If you do not tolerate your medications crushed you may quarter or cut in half and take one piece at a time. You may take your omeprazole (Prilosec), aspirin, metoprolol succinate (Toprol XL) and levothyroxine (Synthroid) whole as they cannot be opened or crushed. After two weeks, you may start taking all your pills whole. Please make sure when taking whole pills, you do not take them all at once. Take them one at a time and allow a minute or so between each one. You may restart your folic acid and Vitamin D in 2 weeks when you are able to take whole pills again. Medications    As discussed during your pre-surgical visits there may need to be changes made to your home medications following surgery. A benefit of weight loss and healthy dietary habits can be an improvement in your blood pressure, blood sugars and edema (swelling). Blood Pressure    Following surgery monitor your blood pressure to evaluate for low blood pressure. A normal blood pressure is generally considered less than 120/80. If your blood pressure is lower than normal and/or youre experiencing symptoms of hypotension such as fatigue, blurred vision, lightheadedness or dizziness please contact your PCP for them to evaluate the need to lower or discontinue your blood pressure medication(s). Edema (Swelling)    Please monitor your swelling following surgery. Activity  You are encouraged to walk through the entire postoperative period as this will help with preventing clots, pneumonia and constipation. You are restricted from lifting anything greater than 10 lbs for the first 6 weeks after surgery.  You may shower starting on postoperative day #2 (second day after surgery), but should not get into baths, pools and/or hot tubs until the provider releases you to do so. Driving  You should not drive for at least the first week after your surgery and/or if you are still taking pain medication. Most patients generally drive to their 2-week postoperative appointment. Constipation  Constipation can occur following surgery. This is due to anesthesia, decreased fluids and fiber in your diet, drinking protein shakes and taking pain medication. Make sure you are getting 48-64 ounces of fluids per day and walking. If you develop constipation you may use docusate sodium (Colace) or Dulcolax which are over-the-counter stool softeners and can be taken 1-2 times per day. If the stool softeners do not help in 2-3 days you can use Miralax which is an over-the-counter laxative and can be taken daily. If this does not help please call the office for further instructions. Pain Management  Following surgery, you will have some pain related to your incisions and from the gas instilled into your abdomen during surgery. It is important for you to walk frequently after surgery to help dissipate the gas pain and to keep your incisions from getting stiff. To help with incisional pain we will provide a prescription for pain medication, but also recommend that you ice your incisions for the days and weeks after surgery. When icing your incisions follow the recommendation of 15 minutes on/15 minutes off rotating to different areas. This will help with the swelling and inflammation related to your incisional pain. In addition, keep wearing your abdominal binder when you are up and about to help with support. Vitamins (Fusion)  When starting the Fusion multivitamin take one per day. Calcium chews do not start until after 6 week post-op visit. Phase I Diet  Please reference the Preoperative Class diet instructions and the Bariatric Surgery Dietary Discharge Instructions handout reviewed in the hospital. Mili Guevara will be on a clear liquid diet for postop day one and two.  Your goal is to get in 48-64oz of fluids daily. Refer to the Clear Liquids List for a list of acceptable clear liquids. Phase II Diet  Please reference the Preoperative Class diet instructions, the Bariatric Surgery Dietary Discharge Instructions handout and the Ideal Meal Process handout reviewed in the hospital. You will start your full liquid and pureed/blenderized foods on postop day three and will continue through postop day twenty. Your priority is to still get in 48-64oz of fluids daily, but you can start back on two of your protein shakes and begin eating approved full liquid & pureed/blenderized food. Your goal is 60-80 grams of protein daily. When drinking your protein shakes you will not be able to drink them as fast as you could before surgery. However, we do not want you to drink your shakes for longer than 40 minutes, so that you are able to get back to your fluids. Food Diary  Please make sure you are keeping a food diary of everything you eat and drink starting on postop day three and bring it with you to your postop visits. Incentive Spirometer  You will continue to use your incentive spirometer (breathing device given to you in hospital) for the first two weeks after surgery to help prevent pneumonia and to open your lungs completely. You will use it ten times per hour while awake. Dermabond Topical Skin Adhesive Care    Dermabond is a clear, sterile liquid skin adhesive that holds wound/incision edges together. The adhesive will usually remain in place for 5 to 10 days, then naturally wear or slough off your skin. Do not scratch, rub, or pick at the Skin Affix adhesive film. Do not apply liquid or ointment medications, soap, powders or lotions to the incision while the Dermabond is in place. You may shower 48 hours after your surgery and briefly wet the Dermabond. Do not sit in a tub of water or swim. Do not soak or scrub your incision. After showering, gently blot your incision dry.  No tape or any type of bandage/covering is required over the Dermabond. PLEASE CONTACT YOUR PRIMARY CARE PHYSICIAN AT DISCHARGE AND COME UP WITH A PLAN FOR THE MANAGEMENT OF YOUR BLOOD PRESSURE MEDICATIONS. IF YOU DO NOT HAVE A WAY TO CHECK YOUR BLOOD PRESSURE AT HOME I SUGGEST THAT YOU  A BLOOD PRESSURE CUFF AT THE STORE TO MONITOR YOUR BLOOD PRESSURE SO THAT YOUR PRIMARY CARE PHYSICIAN CAN EVALUATE THE NEED TO CHANGE ANY OF YOUR MEDICATIONS.

## 2022-12-06 NOTE — PLAN OF CARE
Health Maintenance Due   Topic Date Due   • Shingles Vaccine (1 of 2) Never done   • Influenza Vaccine (1) 09/01/2021       Patient is due for the topics as listed above and wishes to proceed with them. Orders placed for Immunization(s) Influenza.         Problem: Discharge Planning  Goal: Discharge to home or other facility with appropriate resources  12/6/2022 1219 by Demetria Romero RN  Outcome: Adequate for Discharge  12/6/2022 0052 by Tima Murguia RN  Outcome: Progressing  Flowsheets (Taken 12/5/2022 1547 by Demetria Romero RN)  Discharge to home or other facility with appropriate resources: Identify barriers to discharge with patient and caregiver     Problem: Pain  Goal: Verbalizes/displays adequate comfort level or baseline comfort level  12/6/2022 1219 by Demetria Romero RN  Outcome: Adequate for Discharge  12/6/2022 0052 by Tima Murguia RN  Outcome: Progressing     Problem: ABCDS Injury Assessment  Goal: Absence of physical injury  12/6/2022 1219 by Demetria Romero RN  Outcome: Adequate for Discharge  12/6/2022 0052 by Tima Murguia RN  Outcome: Progressing

## 2022-12-06 NOTE — PLAN OF CARE
Problem: Discharge Planning  Goal: Discharge to home or other facility with appropriate resources  Outcome: Progressing  Problem: Pain  Goal: Verbalizes/displays adequate comfort level or baseline comfort level  Outcome: Progressing     Problem: ABCDS Injury Assessment  Goal: Absence of physical injury  Outcome: Progressing

## 2022-12-06 NOTE — PROGRESS NOTES
Discharge instructions, meds and f/u appts reviewed with pt. Pt vu. Nurse removed IV. Pt collected all belongings. Pt discharged home via private vehicle at 1640.

## 2022-12-06 NOTE — CARE COORDINATION
Discharge Planning Note:    Chart reviewed and it appears that patient has minimal needs for discharge at this time. Discussed with patient and requested that case management be notified if discharge needs are identified.     - Current discharge plan is for the patient to return home. Case management will continue to follow progress and update discharge plan as needed.       Risk of Readmission Score: 5%    KEVYN ChouN RN    [facility]  Phone: [phone]

## 2022-12-06 NOTE — DISCHARGE SUMMARY
The patient was admitted on day of surgery and underwent a laparoscopic sleeve gastrectomy, 1ry incarcerated ventral hernia repair & hiatal hernia repair. Surgery went well and the patient went to our post-op bariatric floor. Overnight, the patient had stable vitals signs, good urine output and ambulated in the halls. On POD #1 the patient underwent an UGI which revealed no leak or obstruction. Phase I diet was started and the patient tolerated well. The patient has no N/V, tolerating diet, ambulating and pain controlled on oral medication. The patient was discharged home today in stable condition. The patient will f/u in 2 weeks and was given dietary and ambulation instruction. The patient was instructed to call if there are any questions or concerns. Reviewed pathology report with patient. Patient Active Problem List   Diagnosis    Essential hypertension    Hypothyroid    Right knee pain    Patellofemoral arthrosis    Localized osteoarthrosis, lower leg    Family history of malignant neoplasm of digestive organ    Disease of both eyes characterized by increased eye pressure    Morbid obesity with BMI of 40.0-44.9, adult ([address]    Prediabetes    Chronic GERD    Mixed hyperlipidemia    Severe obesity (BMI 35.0-39. 9) with comorbidity ([address]    Metabolic syndrome    Hiatal hernia    Ventral hernia without obstruction or gangrene    S/P laparoscopic sleeve gastrectomy

## 2022-12-06 NOTE — PROGRESS NOTES
[facility]) Physicians   General & Laparoscopic Surgery  [employer]       Pt seen and examined    S/p laparoscopic sleeve gastrectomy, 1ry incarcerated ventral hernia repair & hiatal hernia repair. The patient is ambulating in heck. Pain is well controlled. There is some nausea, but no vomiting. Patient BMP showed hyperkalemia. There are no other complaints or questions. Vitals:    12/06/22 0100 12/06/22 0415 12/06/22 0430 12/06/22 0800   BP:   125/86 130/80   Pulse: 68 69 67 81   Resp: 15 12 13 14   Temp:       TempSrc:       SpO2: 100% 100% 100% 100%   Weight:       Height:         Abdomen is soft, appropriately tender, incisions stable with no erythema. Breath sounds are clear bilaterally. Bowel sounds are hypoactive in all quadrants.     Data  CBC:   Lab Results   Component Value Date/Time    WBC 8.6 12/06/2022 05:16 AM    RBC 4.72 12/06/2022 05:16 AM    HGB 12.2 12/06/2022 05:16 AM    HCT 36.2 12/06/2022 05:16 AM    MCV 76.6 12/06/2022 05:16 AM    MCH 25.8 12/06/2022 05:16 AM    MCHC 33.7 12/06/2022 05:16 AM    RDW 13.3 12/06/2022 05:16 AM     12/06/2022 05:16 AM    MPV 8.7 12/06/2022 05:16 AM     BMP:    Lab Results   Component Value Date/Time     12/06/2022 05:16 AM    K 5.5 12/06/2022 05:16 AM     12/06/2022 05:16 AM    CO2 28 12/06/2022 05:16 AM    BUN 11 12/06/2022 05:16 AM    LABALBU 4.5 11/25/2022 02:30 PM    CREATININE 0.8 12/06/2022 05:16 AM    CALCIUM 9.9 12/06/2022 05:16 AM    GFRAA >60 08/08/2022 01:53 PM    GFRAA >60 03/05/2013 08:12 AM    LABGLOM >60 12/06/2022 05:16 AM    GLUCOSE 111 12/06/2022 05:16 AM     Current Inpatient Medications    Current Facility-Administered Medications: 0.9 % sodium chloride infusion, , IntraVENous, Continuous  0.9 % sodium chloride infusion, , IntraVENous, Continuous  diphenhydrAMINE (BENADRYL) injection 12.5 mg, 12.5 mg, IntraVENous, Q6H PRN  hydrALAZINE (APRESOLINE) injection 10 mg, 10 mg, IntraVENous, Q2H PRN  hyoscyamine (LEVSIN) 500 MCG/ML injection 250 mcg, 250 mcg, IntraVENous, Q4H PRN  labetalol (NORMODYNE;TRANDATE) injection 10 mg, 10 mg, IntraVENous, Q2H PRN  lidocaine 4 % external patch 1 patch, 1 patch, TransDERmal, Daily  pantoprazole (PROTONIX) 40 mg in sodium chloride (PF) 0.9 % 10 mL injection, 40 mg, IntraVENous, Daily  metoprolol (LOPRESSOR) injection 2.5 mg, 2.5 mg, IntraVENous, Q6H  HYDROmorphone (DILAUDID) 1 mg/mL PCA, , IntraVENous, Continuous  naloxone 0.4 mg in 10 mL sodium chloride syringe, , IntraVENous, PRN  sodium chloride flush 0.9 % injection 5-40 mL, 5-40 mL, IntraVENous, 2 times per day  sodium chloride flush 0.9 % injection 5-40 mL, 5-40 mL, IntraVENous, PRN  0.9 % sodium chloride infusion, , IntraVENous, PRN  HYDROmorphone HCl PF (DILAUDID) injection 0.5 mg, 0.5 mg, IntraVENous, Q3H PRN  ondansetron (ZOFRAN) injection 4 mg, 4 mg, IntraVENous, Q6H PRN  [START ON 12/8/2022] scopolamine (TRANSDERM-SCOP) transdermal patch 1 patch, 1 patch, TransDERmal, Q72H  metoclopramide (REGLAN) injection 10 mg, 10 mg, IntraVENous, Q6H PRN  enoxaparin Sodium (LOVENOX) injection 30 mg, 30 mg, SubCUTAneous, 2 times per day    Patient Active Problem List   Diagnosis    Essential hypertension    Hypothyroid    Right knee pain    Patellofemoral arthrosis    Localized osteoarthrosis, lower leg    Family history of malignant neoplasm of digestive organ    Disease of both eyes characterized by increased eye pressure    Morbid obesity with BMI of 40.0-44.9, adult ([address]    Prediabetes    Chronic GERD    Mixed hyperlipidemia    Severe obesity (BMI 35.0-39. 9) with comorbidity ([address]    Metabolic syndrome    Hiatal hernia    Ventral hernia without obstruction or gangrene    S/P laparoscopic sleeve gastrectomy     A/P  Oscar Velasquez is a 48 y.o. female pod#1, s/p laparoscopic sleeve gastrectomy, 1ry incarcerated ventral hernia repair & hiatal hernia repair. Stable overall.   UGI with no leak/obstruction, will start on Phase I diet. Patient has voided postoperatively and urine output is WNL. Will continue to monitor. Will discontinue PCA and start oral pain medications. Will discontinue IV metoprolol and restart patient's home dose of Toprol XL. Will restart patient's home dose of Lisinopril. Changed IVF from LR to normal saline. Will order one dose of IV Lasix 20 mg.  Repeat BMP 1400. Patient will continue to wear abdominal binder when walking for support. Patient needs to ambulate in the heck every 60-90 minutes. Patient needs to utilize incentive spirometer 10 times per hour while awake. Patient will continue icing incisions. Plan for discharge today if nausea remains controlled, patient continues to void and is tolerating Phase I diet. Discussed with Dr. Meghann Lane and Nursing Staff.     EMIL Alarcon

## 2022-12-06 NOTE — PROGRESS NOTES
CLINICAL PHARMACY NOTE: MEDS TO BEDS    Total # of Prescriptions Filled: 2   The following medications were delivered to the patient:  Ondansetron 8mg  Oxycodone-acetaminophen 5-325mg     Additional Documentation:  Pt signed   Sheri colunga deliver

## 2022-12-07 ENCOUNTER — TELEPHONE (OUTPATIENT)
Dept: FAMILY MEDICINE CLINIC | Age: 47
End: 2022-12-07

## 2022-12-07 NOTE — TELEPHONE ENCOUNTER
Sam [facility] Transitions Initial Follow Up Call    Outreach made within 2 business days of discharge: Yes    Patient: Sudhakar Sanches Patient : 1975   MRN: [id number]  Reason for Admission: There are no discharge diagnoses documented for the most recent discharge. Discharge Date: 22       Spoke with: PT had laparoscopic sleeve gastrectomy, Pt stated she is doing well and does not have any questions on her medications. Has PO appt on 12/15/2022. Discharge department/facility: [facility]    Non-face-to-face services provided:  Obtained and reviewed discharge summary and/or continuity of care documents    TCM Interactive Patient Contact:  Was patient able to fill all prescriptions: Yes  Was patient instructed to bring all medications to the follow-up visit: Yes  Is patient taking all medications as directed in the discharge summary?  Yes  Does patient understand their discharge instructions: Yes  Does patient have questions or concerns that need addressed prior to 7-14 day follow up office visit: no    Scheduled appointment with PCP within 7-14 days    Follow Up  Future Appointments   Date Time Provider Mag Wilson   12/15/2022  3:45 PM MD Apolonia Johnson [facility]   2023  8:00 AM MD MALCOM Junior LPN

## 2022-12-12 ENCOUNTER — TELEPHONE (OUTPATIENT)
Dept: BARIATRICS/WEIGHT MGMT | Age: 47
End: 2022-12-12

## 2022-12-12 NOTE — TELEPHONE ENCOUNTER
Surgery Type: sleeve w/hernia repair     Surgery Date: 12/5/22    Surgeon: Tyron Bowman  I attempted to contact the patient to follow up with them regarding their recent surgery. I have left a voicemail for the patient to return our call. If she calls back this morning, please forward call to me or send message to me. If she calls back after this morning, please place message to myself and the dietitians. Thanks!

## 2022-12-12 NOTE — TELEPHONE ENCOUNTER
Surgery Type: sleeve with hernia repair    Surgery Date: 12/5/22    Surgeon: Eloisa Perez    The patient was contacted to follow up on their recent bariatric surgery. The following topics were reviewed:    [x] Hydration is Adequate - pt is meeting 48oz-50oz. Water, propel, decaf tea with sweet n low. --Patient is getting at least 48-64 oz of fluids a day, not including protein shakes. [x]Consuming Adequate Protein - pt was able to get in 1 protein shake per day for the last few days - mixing with US almond milk. Has been drinking it in 1 sitting- able to drink in 15 minutes. Encouraged slowing down as she feels full afterwards. []Consuming 2 protein shakes a day                []Consuming 60-80 grams of protein a day    [] Food intake is appropriate - has not started yet, we discussed starting pureed foods after she has met her goal of 2 protein shakes  []Adequately pureeing foods, so that there are no chunks left. []Taking in 1-2 oz at a time  [] Eating 4-6 times a day  [] Following the 30-30-30 rule  [x] Reminded patient to keep food diary to bring to their 2 week follow up appointment. [x] Pain relief techniques utilized - pt reports some pain around belly button   [x] Taking pain medication as prescribed - using every 6-7 hours  [] Utilizing Lidoderm patches (if prescribed) - she has some, will consider using as needed. []Taking Tylenol instead of prescription pain medication - we discussed timing and dosage as she begins to wean off prescription. [x] Wearing abdominal binder  [] Using ice for incisional pain -NO.   encouraged using for pain  as needed    [x] Activity is appropriate  [x] Walking 10 minutes out of every hour  [x] Avoiding heavy lifting (>10lbs)  [] Utilizing their incentive spirometer- not using every hour - a few times per day    [] Issues with Nausea/Vomiting/Reflux - NO issues  [] Using Zofran PRN for nausea/vomiting   []Taking Prilosec for reflux    [] Issues with Constipation - NO issues, had a BM yesterday   []Tried Colace  []Tried Miralax    All questions and concerns addressed including pt had questions about starting MVI capsule. Patient was asked to please fill out hospital survey if she receives one in the mail.

## 2022-12-15 ENCOUNTER — OFFICE VISIT (OUTPATIENT)
Dept: BARIATRICS/WEIGHT MGMT | Age: 47
End: 2022-12-15

## 2022-12-15 VITALS
HEART RATE: 89 BPM | OXYGEN SATURATION: 98 % | WEIGHT: 218 LBS | RESPIRATION RATE: 18 BRPM | BODY MASS INDEX: 37.22 KG/M2 | HEIGHT: 64 IN | SYSTOLIC BLOOD PRESSURE: 93 MMHG | DIASTOLIC BLOOD PRESSURE: 60 MMHG

## 2022-12-15 DIAGNOSIS — Z98.84 S/P LAPAROSCOPIC SLEEVE GASTRECTOMY: Primary | ICD-10-CM

## 2022-12-15 PROCEDURE — 99024 POSTOP FOLLOW-UP VISIT: CPT | Performed by: SURGERY

## 2022-12-15 NOTE — PROGRESS NOTES
Physician Progress Note      PATIENT:               Geraldine Marti  CSN #:                  [id number]  :                       1975  ADMIT DATE:       2022 6:03 AM  DISCH DATE:        2022 5:01 PM  RESPONDING  PROVIDER #:        Myron Stafford CNP          QUERY TEXT:    Patient admitted with morbid obesity. Noted documentation of \"metabolic   syndrome\" as an active hospital problem per progress note . ? Please update   active hospital problems appropriately to reflect response. The medical record reflects the following:  Risk Factors: 47 yo female w/ known morbid obesity  Clinical Indicators: per documentation of HTN, morbid obesity and   hyperlipidemia  Treatment:  Metoprolol, Lipitor, HCTZ, Lisinopril, frequent BP monitoring and   daily labs. Options provided:  -- Metabolic syndrome is currently being treated/evaluated  -- Other - I will add my own diagnosis  -- Disagree - Not applicable / Not valid  -- Disagree - Clinically unable to determine / Unknown  -- Refer to Clinical Documentation Reviewer    PROVIDER RESPONSE TEXT:    Metabolic syndrome is currently being treated/evaluated.     Query created by: Keyana Du on 12/15/2022 11:00 AM      Electronically signed by:  Myron Stafford CNP 12/15/2022 11:37 AM

## 2022-12-15 NOTE — PROGRESS NOTES
[facility]) Physicians   [employer] [facility] [employer]  David Wyman MD, [facility] [address], [address] [facility] [address], [address] [facility]    [place]  [address] . Phone: [phone]  Fax: [phone]       The patient is a 50 y.o. female who returns today for follow up. Landon Zarco is s/p     Laparoscopic sleeve gastrectomy    We discussed how her weight affects her overall health including:  Patient Active Problem List   Diagnosis    Essential hypertension    Hypothyroid    Right knee pain    Patellofemoral arthrosis    Localized osteoarthrosis, lower leg    Family history of malignant neoplasm of digestive organ    Disease of both eyes characterized by increased eye pressure    Morbid obesity with BMI of 40.0-44.9, adult ([address]    Prediabetes    Chronic GERD    Mixed hyperlipidemia    Severe obesity (BMI 35.0-39. 9) with comorbidity ([address]    Metabolic syndrome    Hiatal hernia    Ventral hernia without obstruction or gangrene    S/P laparoscopic sleeve gastrectomy        Vitals:    12/15/22 1542   BP: 93/60   Pulse: 89   Resp: 18   SpO2: 98%   Weight: 218 lb (98.9 kg)   Height: 5' 4\" (1.626 m)       Lab Results   Component Value Date/Time    WBC 8.6 12/06/2022 05:16 AM    RBC 4.72 12/06/2022 05:16 AM    HGB 12.2 12/06/2022 05:16 AM    HCT 36.2 12/06/2022 05:16 AM    MCV 76.6 12/06/2022 05:16 AM    MCH 25.8 12/06/2022 05:16 AM    MCHC 33.7 12/06/2022 05:16 AM    MPV 8.7 12/06/2022 05:16 AM    NEUTOPHILPCT 63.8 08/08/2022 01:53 PM    LYMPHOPCT 27.7 08/08/2022 01:53 PM    MONOPCT 4.3 08/08/2022 01:53 PM    EOSRELPCT 3.3 08/08/2022 01:53 PM    BASOPCT 0.9 08/08/2022 01:53 PM    NEUTROABS 4.2 08/08/2022 01:53 PM    LYMPHSABS 1.8 08/08/2022 01:53 PM    MONOSABS 0.3 08/08/2022 01:53 PM    EOSABS 0.2 08/08/2022 01:53 PM     Lab Results   Component Value Date/Time     12/06/2022 01:52 PM    K 4.0 12/06/2022 01:52 PM    CL 99 12/06/2022 01:52 PM    CO2 28 12/06/2022 01:52 PM    ANIONGAP 10 12/06/2022 01:52 PM    GLUCOSE 96 12/06/2022 01:52 PM    BUN 10 12/06/2022 01:52 PM    CREATININE 0.7 12/06/2022 01:52 PM    LABGLOM >60 12/06/2022 01:52 PM    GFRAA >60 08/08/2022 01:53 PM    GFRAA >60 03/05/2013 08:12 AM    CALCIUM 9.8 12/06/2022 01:52 PM    PROT 7.3 11/25/2022 02:30 PM    PROT 7.0 03/05/2013 08:12 AM    LABALBU 4.5 11/25/2022 02:30 PM    AGRATIO 1.6 11/25/2022 02:30 PM    BILITOT 0.5 11/25/2022 02:30 PM    ALKPHOS 110 11/25/2022 02:30 PM    ALT 21 11/25/2022 02:30 PM    AST 15 11/25/2022 02:30 PM    GLOB 2.5 06/28/2021 09:55 AM     Lab Results   Component Value Date/Time    CHOL 210 08/08/2022 01:53 PM    TRIG 91 08/08/2022 01:53 PM    HDL 60 08/08/2022 01:53 PM    HDL 86 10/21/2011 08:44 AM    LDLCALC 132 08/08/2022 01:53 PM    LABVLDL 18 08/08/2022 01:53 PM     Lab Results   Component Value Date/Time    TSHREFLEX 3.27 08/08/2022 01:53 PM     Lab Results   Component Value Date/Time    IRON 109 08/08/2022 01:53 PM    TIBC 334 08/08/2022 01:53 PM    LABIRON 33 08/08/2022 01:53 PM     Lab Results   Component Value Date/Time    HEMQRQBK20 269 08/08/2022 01:53 PM    FOLATE 4.23 08/08/2022 01:53 PM     Lab Results   Component Value Date/Time    VITD25 29.5 08/08/2022 01:53 PM     Lab Results   Component Value Date/Time    LABA1C 5.8 08/08/2022 01:53 PM    .8 08/08/2022 01:53 PM        The patient's current Body mass index is 37.42 kg/m². (12/15/22). Since her last visit she has lost 18 lbs since last visit and total of 35 lbs. Galilea Chapman underwent dietary counseling, and I have reviewed and agree with the dietary counseling, and I have reviewed and agree with the diet plan. There are no changes in the patients medical history or physical exam.     Denies nausea, vomiting, fevers, chills, hiccups, shoulder pain, heartburn, dysphagia wound drainage/bulge nor change in color around incision. Bowels working ok and making urine. The incisions healing well. Overall I'm really pleased with Galilea Chapman recovery.  Pathology results were discussed with the patient. Kaylyn Aragon advised to sign  release form for utilizing the 3 months complimentary membership in the [address] [place] [address] starting after 6 weeks post op. I did explain thoroughly to the patient that compliance with  post op diet and other recommendations are integral part to improve the chances of successful weight loss and also not following it could end with serious health complications. I advised Kaylyn Araogn to gradually advance activity and  to call if there are any questions or concerns. Kaylyn Aragon will follow up in 4 weeks. Please note that some or all of this report was generated using voice recognition software. Please notify me in case of any questions about the content of this document, as some errors in transcription may have occurred .       Electronically signed by Tish Bai MD , FACS, FASMBS  on 12/15/2022 at 4:23 PM

## 2022-12-15 NOTE — PROGRESS NOTES
Dietary Assessment Note      Vitals:   Vitals:    12/15/22 1542   Resp: 18   Height: 5' 4\" (1.626 m)    Patient lost 18 lbs since preop. Total Weight Loss: 35 lbs    Labs reviewed: labs are reviewed    Protein intake: 2 protein shakes    Fluid intake: 48 or more    Multivitamin/mineral intake: started fusion capsule    Calcium intake: not yet    Other: folic acid     Exercise: walking daily about 30 minutes total    Nutrition Assessment: 2 week post-op visit. Yesterday had pureed food twice (low fat soup and applesauce)     Amount able to eat per sittin oz     Following  rule: yes but sometimes taking  a little longer than 30 minutes to eat     Food Intolerances/issues: tolerating     Client Concerns: timing out food and protein and fluids and meeting nutrition needs. Goals: Advance to phase 3 at 3 weeks post op     Plan: swap out 1 protein shake with protein water to meet hydration and protein shake goals. No need to eat 2 oz food, ok to eat 1 oz, limiting meals to 30 minutes.      Kaylie Melendez, MS, RD, LD

## 2023-01-03 ENCOUNTER — PATIENT MESSAGE (OUTPATIENT)
Dept: FAMILY MEDICINE CLINIC | Age: 48
End: 2023-01-03

## 2023-01-04 NOTE — TELEPHONE ENCOUNTER
From: Yusra Citizen  To: Dr. Althea Kidd: 1/3/2023 8:28 PM EST  Subject: Blood Pressure    Hi Dr. Faustina Zimmerman,    My blood pressure has been low. Id been feeling dizzy when standing up and bending over, so I started recording my readings. Thought Id share it with you to see what you think.     Thanks,  Artem Chan    BP-  82/95/10 - 95/58  12/20/22 - 105/65  12/21/22 - 86/52  12/22/22 - 94/53  12/26/22 - 81/40  1/2/22 - 92/56  1/3/22 - 85/56

## 2023-01-06 RX ORDER — METOPROLOL SUCCINATE 25 MG/1
TABLET, EXTENDED RELEASE ORAL
Qty: 90 TABLET | Refills: 0 | Status: SHIPPED | OUTPATIENT
Start: 2023-01-06

## 2023-01-26 ENCOUNTER — OFFICE VISIT (OUTPATIENT)
Dept: BARIATRICS/WEIGHT MGMT | Age: 48
End: 2023-01-26

## 2023-01-26 VITALS
OXYGEN SATURATION: 98 % | WEIGHT: 201 LBS | RESPIRATION RATE: 18 BRPM | HEART RATE: 87 BPM | DIASTOLIC BLOOD PRESSURE: 78 MMHG | SYSTOLIC BLOOD PRESSURE: 122 MMHG | BODY MASS INDEX: 34.31 KG/M2 | HEIGHT: 64 IN

## 2023-01-26 DIAGNOSIS — Z98.84 S/P LAPAROSCOPIC SLEEVE GASTRECTOMY: ICD-10-CM

## 2023-01-26 DIAGNOSIS — E66.01 MORBID OBESITY WITH BMI OF 40.0-44.9, ADULT (HCC): ICD-10-CM

## 2023-01-26 DIAGNOSIS — K44.9 HIATAL HERNIA: ICD-10-CM

## 2023-01-26 DIAGNOSIS — R73.03 PREDIABETES: ICD-10-CM

## 2023-01-26 DIAGNOSIS — E88.81 METABOLIC SYNDROME: Primary | ICD-10-CM

## 2023-01-26 DIAGNOSIS — I10 ESSENTIAL HYPERTENSION: ICD-10-CM

## 2023-01-26 DIAGNOSIS — E78.2 MIXED HYPERLIPIDEMIA: ICD-10-CM

## 2023-01-26 PROBLEM — K21.9 CHRONIC GERD: Status: RESOLVED | Noted: 2022-07-28 | Resolved: 2023-01-26

## 2023-01-26 PROCEDURE — 99024 POSTOP FOLLOW-UP VISIT: CPT | Performed by: SURGERY

## 2023-01-26 NOTE — PROGRESS NOTES
[facility]) Physicians   [employer] [facility] [employer]  Alyssia Reyes MD, [facility] [address], [address] [facility] [address], [address] [place], [place] [address] [address] . Phone: [phone]  Fax: [phone]       The patient is a 50 y.o. female who returns today for follow up. 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 9) with comorbidity ([address]    Metabolic syndrome    Hiatal hernia    Ventral hernia without obstruction or gangrene    S/P laparoscopic sleeve gastrectomy        Vitals:    01/26/23 0856   BP: 122/78   Pulse: 87   Resp: 18   SpO2: 98%   Weight: 201 lb (91.2 kg)   Height: 5' 4\" (1.626 m)       Lab Results   Component Value Date/Time    WBC 8.6 12/06/2022 05:16 AM    RBC 4.72 12/06/2022 05:16 AM    HGB 12.2 12/06/2022 05:16 AM    HCT 36.2 12/06/2022 05:16 AM    MCV 76.6 12/06/2022 05:16 AM    MCH 25.8 12/06/2022 05:16 AM    MCHC 33.7 12/06/2022 05:16 AM    MPV 8.7 12/06/2022 05:16 AM    NEUTOPHILPCT 63.8 08/08/2022 01:53 PM    LYMPHOPCT 27.7 08/08/2022 01:53 PM    MONOPCT 4.3 08/08/2022 01:53 PM    EOSRELPCT 3.3 08/08/2022 01:53 PM    BASOPCT 0.9 08/08/2022 01:53 PM    NEUTROABS 4.2 08/08/2022 01:53 PM    LYMPHSABS 1.8 08/08/2022 01:53 PM    MONOSABS 0.3 08/08/2022 01:53 PM    EOSABS 0.2 08/08/2022 01:53 PM     Lab Results   Component Value Date/Time     12/06/2022 01:52 PM    K 4.0 12/06/2022 01:52 PM    CL 99 12/06/2022 01:52 PM    CO2 28 12/06/2022 01:52 PM    ANIONGAP 10 12/06/2022 01:52 PM    GLUCOSE 96 12/06/2022 01:52 PM    BUN 10 12/06/2022 01:52 PM    CREATININE 0.7 12/06/2022 01:52 PM    LABGLOM >60 12/06/2022 01:52 PM    GFRAA >60 08/08/2022 01:53 PM    GFRAA >60 03/05/2013 08:12 AM    CALCIUM 9.8 12/06/2022 01:52 PM    PROT 7.3 11/25/2022 02:30 PM    PROT 7.0 03/05/2013 08:12 AM    LABALBU 4.5 11/25/2022 02:30 PM    AGRATIO 1.6 11/25/2022 02:30 PM    BILITOT 0.5 11/25/2022 02:30 PM    ALKPHOS 110 11/25/2022 02:30 PM    ALT 21 11/25/2022 02:30 PM    AST 15 11/25/2022 02:30 PM    GLOB 2.5 06/28/2021 09:55 AM     Lab Results   Component Value Date/Time    CHOL 210 08/08/2022 01:53 PM    TRIG 91 08/08/2022 01:53 PM    HDL 60 08/08/2022 01:53 PM    HDL 86 10/21/2011 08:44 AM    LDLCALC 132 08/08/2022 01:53 PM    LABVLDL 18 08/08/2022 01:53 PM     Lab Results   Component Value Date/Time    TSHREFLEX 3.27 08/08/2022 01:53 PM     Lab Results   Component Value Date/Time    IRON 109 08/08/2022 01:53 PM    TIBC 334 08/08/2022 01:53 PM    LABIRON 33 08/08/2022 01:53 PM     Lab Results   Component Value Date/Time    [id number] 269 08/08/2022 01:53 PM    FOLATE 4.23 08/08/2022 01:53 PM     Lab Results   Component Value Date/Time    VITD25 29.5 08/08/2022 01:53 PM     Lab Results   Component Value Date/Time    LABA1C 5.8 08/08/2022 01:53 PM    .8 08/08/2022 01:53 PM        The patient's current Body mass index is 34.5 kg/m². (1/26/23). Since her last visit she has lost 17 lbs since last visit and total of 52 lbs. Nehal Garcia underwent dietary counseling, and I have reviewed and agree with the dietary counseling, and I have reviewed and agree with the diet plan. There are no changes in the patients medical history or physical exam.     No GERD, BP meds are weaned off gradually. Denies nausea, vomiting, fevers, chills, hiccups, shoulder pain, heartburn, dysphagia wound drainage/bulge nor change in color around incision. Bowels working ok and making urine. The incisions healing well.  Overall I'm really pleased with Nehal Garcia recovery. Pathology results were discussed with the patient. María Elena Lj advised to sign  release form for utilizing the 3 months complimentary membership in the [address] [place] [address] starting after 6 weeks post op. I did explain thoroughly to the patient that compliance with  post op diet and other recommendations are integral part to improve the chances of successful weight loss and also not following it could end with serious health complications. I advised María Elena Lj to gradually advance activity and  to call if there are any questions or concerns. María Elena Lj will follow up in 8 weeks. Please note that some or all of this report was generated using voice recognition software. Please notify me in case of any questions about the content of this document, as some errors in transcription may have occurred .       Electronically signed by Marleny Templeton MD , FACS, FASMBS  on 1/26/2023 at 9:24 AM

## 2023-01-26 NOTE — PROGRESS NOTES
Dietary Assessment Note      Vitals:   Vitals:    23 0856   BP: 122/78   Pulse: 87   Resp: 18   SpO2: 98%   Weight: 201 lb (91.2 kg)   Height: 5' 4\" (1.626 m)    Patient lost 17 lbs over 5 weeks. Total Weight Loss: 52 lbs    Labs reviewed: no lab studies available for review at time of visit    Protein intake: 60-80 grams/day using shake in AM and protein water 1/day    Fluid intake: 48-64 oz/day 3 bottles/day     Multivitamin/mineral intake: fusion capsule w/ iron    Calcium intake:  reviewed today    Other: folic acid     Exercise:  walking     Nutrition Assessment: 7 weeks post-op visit. Tracking with [employer] 430-530 kcal/day  Breakfast: Protein shake   Snack: None  Lunch: 2 oz Turkey/cheese roll up   Snack: None  Dinner: 2 oz Chili + baby bell cheese  Snack: Protein 20     Amount able to eat per sittin oz    Following 30/30/30 rule: Eating over 30 minutes. Waits 30 minutes between eating and drinking.     Food Intolerances/issues: none    Client Concerns: none    Goals:   - Advance to phase 4 diet   - Start Calcium Chews 2/ day  - Set timers to remember to eat every 3 hours    Plan: Follow up at 4 months post op and as needed    Haeys Wise RD, LD

## 2023-02-06 RX ORDER — SPIRONOLACTONE 25 MG/1
TABLET ORAL
Qty: 90 TABLET | Refills: 1 | Status: SHIPPED | OUTPATIENT
Start: 2023-02-06

## 2023-02-06 NOTE — TELEPHONE ENCOUNTER
Refill Request     CONFIRM preferrred pharmacy with the patient. If Mail Order Rx - Pend for 90 day refill. Last Seen: Last Seen Department: 11/17/2022  Last Seen by PCP: 11/17/2022    Last Written: 9/1/22 1 refill    If no future appointment scheduled, route STAFF MESSAGE with patient name to the [facility] for scheduling. Next Appointment:   Future Appointments   Date Time Provider Mag Wilson   3/23/2023  9:30 AM MD Sarah Frankel [facility]   4/18/2023  8:00 AM Isaac Gomez MD [facility]       Message sent to [address] [place] [address] to schedule appt with patient?   NO      Requested Prescriptions     Pending Prescriptions Disp Refills    spironolactone (ALDACTONE) 25 MG tablet [Pharmacy Med Name: Borjas Rj  TAB 25MG] 90 tablet 1     Sig: TAKE 1 TABLET DAILY

## 2023-03-23 ENCOUNTER — OFFICE VISIT (OUTPATIENT)
Dept: BARIATRICS/WEIGHT MGMT | Age: 48
End: 2023-03-23

## 2023-03-23 VITALS
OXYGEN SATURATION: 98 % | RESPIRATION RATE: 18 BRPM | DIASTOLIC BLOOD PRESSURE: 66 MMHG | WEIGHT: 187 LBS | HEART RATE: 76 BPM | HEIGHT: 64 IN | SYSTOLIC BLOOD PRESSURE: 111 MMHG | BODY MASS INDEX: 31.92 KG/M2

## 2023-03-23 DIAGNOSIS — Z98.84 S/P LAPAROSCOPIC SLEEVE GASTRECTOMY: Primary | ICD-10-CM

## 2023-03-23 DIAGNOSIS — K44.9 HIATAL HERNIA: ICD-10-CM

## 2023-03-23 DIAGNOSIS — E78.2 MIXED HYPERLIPIDEMIA: ICD-10-CM

## 2023-03-23 DIAGNOSIS — R73.03 PREDIABETES: ICD-10-CM

## 2023-03-23 DIAGNOSIS — E88.81 METABOLIC SYNDROME: ICD-10-CM

## 2023-03-23 DIAGNOSIS — I10 ESSENTIAL HYPERTENSION: ICD-10-CM

## 2023-03-23 NOTE — PATIENT INSTRUCTIONS
Patient received dietary handouts and education.     Goals:   - Track 1847-7316 kcal, 60-90 grams protein, 40-47 grams fat,  grams carb, 2000 mg sodium  - Eat by 9 AM and space meals ~3 hours apart    - Be consistent with walking and increase intensity

## 2023-03-23 NOTE — PROGRESS NOTES
Dietary Assessment Note      Vitals:   Vitals:    23 0927   BP: 111/66   Pulse: 76   Resp: 18   SpO2: 98%   Weight: 187 lb (84.8 kg)   Height: 5' 4\" (1.626 m)    Patient lost 14 lbs over 8 weeks. Total Weight Loss: 66 lbs    Labs reviewed: no lab studies available for review at time of visit    Protein intake: 60 grams/day     Fluid intake: 48-64 oz/day 3 bottles/day     Multivitamin/mineral intake: 1/day fusion capsule w/ iron    Calcium intake: 2/day fusion calcium chews     Other: folic acid     Exercise:  walking   4X/week    Nutrition Assessment: 4 months post-op visit. Tracking with [employer] ~700 kcal/day. Wake up 7:30 AM. Works at 9:30 AM-6:30 PM  Breakfast: None  Snack: 12 PM: Greek yogurt (20 grams protein) OR egg w/ ham/cheese  Lunch:  2:30 PM: 4 oz Turkey/cheese roll up + sometimes fruit   Snack: 6:30 PM: Applesauce OR celery w/ lf ranch/lf sour cream  Dinner: 8:30 PM: Burger carla w/ cheese + broccoli   Snack: 9:30 PM: carb balance ice cream bar  Bed around 12 AM, feels rested      Amount able to eat per sittin/2 cup     Following 3030 rule: Eating over 30 minutes. Waits 30 minutes between eating and drinking.     Food Intolerances/issues: none    Client Concerns: none    Goals:   - Track 3835-4748 kcal, 60-90 grams protein, 40-47 grams fat,  grams carb, 2000 mg sodium  - Eat by 9 AM and space meals ~3 hours apart    - Be consistent with walking and increase intensity     Plan: Follow up at 6 months post op and as needed    Kacie Rene, RD, LD
12/06/2022 01:52 PM    K 4.0 12/06/2022 01:52 PM    CL 99 12/06/2022 01:52 PM    CO2 28 12/06/2022 01:52 PM    ANIONGAP 10 12/06/2022 01:52 PM    GLUCOSE 96 12/06/2022 01:52 PM    BUN 10 12/06/2022 01:52 PM    CREATININE 0.7 12/06/2022 01:52 PM    LABGLOM >60 12/06/2022 01:52 PM    GFRAA >60 08/08/2022 01:53 PM    GFRAA >60 03/05/2013 08:12 AM    CALCIUM 9.8 12/06/2022 01:52 PM    PROT 7.3 11/25/2022 02:30 PM    PROT 7.0 03/05/2013 08:12 AM    LABALBU 4.5 11/25/2022 02:30 PM    AGRATIO 1.6 11/25/2022 02:30 PM    BILITOT 0.5 11/25/2022 02:30 PM    ALKPHOS 110 11/25/2022 02:30 PM    ALT 21 11/25/2022 02:30 PM    AST 15 11/25/2022 02:30 PM    GLOB 2.5 06/28/2021 09:55 AM     Lab Results   Component Value Date/Time    CHOL 210 08/08/2022 01:53 PM    TRIG 91 08/08/2022 01:53 PM    HDL 60 08/08/2022 01:53 PM    HDL 86 10/21/2011 08:44 AM    LDLCALC 132 08/08/2022 01:53 PM    LABVLDL 18 08/08/2022 01:53 PM     Lab Results   Component Value Date/Time    TSHREFLEX 3.27 08/08/2022 01:53 PM     Lab Results   Component Value Date/Time    IRON 109 08/08/2022 01:53 PM    TIBC 334 08/08/2022 01:53 PM    LABIRON 33 08/08/2022 01:53 PM     Lab Results   Component Value Date/Time    GYVGIZER37 269 08/08/2022 01:53 PM    FOLATE 4.23 08/08/2022 01:53 PM     Lab Results   Component Value Date/Time    VITD25 29.5 08/08/2022 01:53 PM     Lab Results   Component Value Date/Time    LABA1C 5.8 08/08/2022 01:53 PM    .8 08/08/2022 01:53 PM         Current Outpatient Medications:     spironolactone (ALDACTONE) 25 MG tablet, TAKE 1 TABLET DAILY, Disp: 90 tablet, Rfl: 1    metoprolol succinate (TOPROL XL) 25 MG extended release tablet, TAKE 1 TABLET NIGHTLY, Disp: 90 tablet, Rfl: 0    ondansetron (ZOFRAN-ODT) 8 MG TBDP disintegrating tablet, Place 1 tablet under the tongue every 8 hours as needed for Nausea or Vomiting, Disp: 30 tablet, Rfl: 0    ondansetron (ZOFRAN-ODT) 8 MG TBDP disintegrating tablet, Place 1 tablet under the tongue

## 2023-04-05 RX ORDER — ASPIRIN 81 MG/1
TABLET, COATED ORAL
Qty: 90 TABLET | Refills: 1 | Status: SHIPPED | OUTPATIENT
Start: 2023-04-05

## 2023-04-05 NOTE — TELEPHONE ENCOUNTER
Last ov- 09/28/22 [facility]   No Upcoming ov- attempted to reach to schedule   Cbc- 12/06/22

## 2023-04-18 ENCOUNTER — OFFICE VISIT (OUTPATIENT)
Dept: FAMILY MEDICINE CLINIC | Age: 48
End: 2023-04-18

## 2023-04-18 VITALS
SYSTOLIC BLOOD PRESSURE: 114 MMHG | OXYGEN SATURATION: 99 % | WEIGHT: 181 LBS | HEART RATE: 60 BPM | DIASTOLIC BLOOD PRESSURE: 70 MMHG | BODY MASS INDEX: 31.07 KG/M2

## 2023-04-18 DIAGNOSIS — Z01.419 ENCOUNTER FOR GYNECOLOGICAL EXAMINATION WITHOUT ABNORMAL FINDING: ICD-10-CM

## 2023-04-18 DIAGNOSIS — I49.9 IRREGULAR HEART BEAT: ICD-10-CM

## 2023-04-18 DIAGNOSIS — I10 ESSENTIAL HYPERTENSION: ICD-10-CM

## 2023-04-18 DIAGNOSIS — Z12.31 ENCOUNTER FOR SCREENING MAMMOGRAM FOR MALIGNANT NEOPLASM OF BREAST: ICD-10-CM

## 2023-04-18 DIAGNOSIS — Z00.00 ROUTINE GENERAL MEDICAL EXAMINATION AT A HEALTH CARE FACILITY: Primary | ICD-10-CM

## 2023-04-18 DIAGNOSIS — Z12.11 COLON CANCER SCREENING: ICD-10-CM

## 2023-04-18 ASSESSMENT — PATIENT HEALTH QUESTIONNAIRE - PHQ9
1. LITTLE INTEREST OR PLEASURE IN DOING THINGS: 0
SUM OF ALL RESPONSES TO PHQ QUESTIONS 1-9: 0
SUM OF ALL RESPONSES TO PHQ QUESTIONS 1-9: 0
2. FEELING DOWN, DEPRESSED OR HOPELESS: 0
SUM OF ALL RESPONSES TO PHQ9 QUESTIONS 1 & 2: 0
SUM OF ALL RESPONSES TO PHQ QUESTIONS 1-9: 0
SUM OF ALL RESPONSES TO PHQ QUESTIONS 1-9: 0

## 2023-04-18 ASSESSMENT — ENCOUNTER SYMPTOMS
GASTROINTESTINAL NEGATIVE: 1
RESPIRATORY NEGATIVE: 1

## 2023-04-18 NOTE — PATIENT INSTRUCTIONS
your teeth twice a day, and wear a seat belt in the car. Where can you learn more? Go to http://www.winkler.com/ and enter P072 to learn more about \"Well Visit, Ages 25 to 72: Care Instructions. \"  Current as of: November 14, 2022               Content Version: 13.6  © 2055-9937 [employer]wise, Incorporated. Care instructions adapted under license by [facility] ([facility]). If you have questions about a medical condition or this instruction, always ask your healthcare professional. [address] any warranty or liability for your use of this information.

## 2023-04-18 NOTE — PROGRESS NOTES
Smokeless tobacco: Never   Substance Use Topics    Alcohol use: Not Currently     Alcohol/week: 21.0 standard drinks     Types: 15 Cans of beer, 6 Shots of liquor per week     Comment: 3 days    Drug use: No       Objective   LMP 09/23/2021 (Approximate) Comment: over a year  Wt Readings from Last 3 Encounters:   03/23/23 187 lb (84.8 kg)   01/26/23 201 lb (91.2 kg)   12/15/22 218 lb (98.9 kg)     There were no vitals filed for this visit. Physical Exam  Vitals and nursing note reviewed. Constitutional:       Appearance: Normal appearance. HENT:      Head: Normocephalic and atraumatic. Right Ear: Tympanic membrane, ear canal and external ear normal. There is no impacted cerumen. Left Ear: Tympanic membrane, ear canal and external ear normal. There is no impacted cerumen. Nose: Nose normal. No congestion or rhinorrhea. Mouth/Throat:      Mouth: Mucous membranes are moist.      Pharynx: Oropharynx is clear. No oropharyngeal exudate or posterior oropharyngeal erythema. Eyes:      General: No scleral icterus. Right eye: No discharge. Left eye: No discharge. Conjunctiva/sclera: Conjunctivae normal.      Pupils: Pupils are equal, round, and reactive to light. Neck:      Vascular: No carotid bruit. Cardiovascular:      Rate and Rhythm: Normal rate and regular rhythm. Extrasystoles are present. Heart sounds: Normal heart sounds. No murmur heard. No friction rub. No gallop. Pulmonary:      Effort: Pulmonary effort is normal. No respiratory distress. Breath sounds: Normal breath sounds. No stridor. No wheezing, rhonchi or rales. Abdominal:      General: Bowel sounds are normal.      Palpations: Abdomen is soft. There is no mass. Tenderness: There is no abdominal tenderness. There is no right CVA tenderness or left CVA tenderness. Musculoskeletal:      Cervical back: Neck supple. No muscular tenderness. Right lower leg: No edema.       Left

## 2023-04-19 NOTE — TELEPHONE ENCOUNTER
Refill Request     CONFIRM preferred pharmacy with the patient. If Mail Order Rx - Pend for 90 day refill. Last Seen: Last Seen Department: 4/18/2023  Last Seen by PCP: 4/18/2023    Last Written: 3/8/2021 60 with 0     If no future appointment scheduled:  Review the last OV with PCP and review information for follow-up visit,  Route STAFF MESSAGE with patient name to the LTAC, located within [facility] for scheduling with the following information:            -  Timing of next visit           -  Visit type ie Physical, OV, etc           -  Diagnoses/Reason ie. COPD, HTN - Do not use MEDICATION, Follow-up or CHECK UP - Give reason for visit      Next Appointment:   Future Appointments   Date Time Provider Mag Wilson   5/25/2023  9:00 AM Rebecca Chinchilla MD Ala Confer [facility]   4/19/2024  9:00 AM MD MALCOM Hodges  [facility]       Message sent to [address] [place] [address] to schedule appt with patient?   NO      Requested Prescriptions     Pending Prescriptions Disp Refills    metoprolol succinate (TOPROL XL) 50 MG extended release tablet 90 tablet 0     Sig: Take 1 tablet by mouth nightly

## 2023-04-21 RX ORDER — METOPROLOL SUCCINATE 50 MG/1
50 TABLET, EXTENDED RELEASE ORAL NIGHTLY
Qty: 90 TABLET | Refills: 0 | OUTPATIENT
Start: 2023-04-21

## 2023-05-25 ENCOUNTER — OFFICE VISIT (OUTPATIENT)
Dept: BARIATRICS/WEIGHT MGMT | Age: 48
End: 2023-05-25
Payer: COMMERCIAL

## 2023-05-25 VITALS
HEIGHT: 64 IN | OXYGEN SATURATION: 99 % | HEART RATE: 76 BPM | WEIGHT: 171 LBS | DIASTOLIC BLOOD PRESSURE: 73 MMHG | BODY MASS INDEX: 29.19 KG/M2 | SYSTOLIC BLOOD PRESSURE: 125 MMHG | RESPIRATION RATE: 18 BRPM

## 2023-05-25 DIAGNOSIS — R73.03 PREDIABETES: ICD-10-CM

## 2023-05-25 DIAGNOSIS — E66.01 MORBID OBESITY WITH BMI OF 40.0-44.9, ADULT (HCC): ICD-10-CM

## 2023-05-25 DIAGNOSIS — E78.2 MIXED HYPERLIPIDEMIA: ICD-10-CM

## 2023-05-25 DIAGNOSIS — I10 ESSENTIAL HYPERTENSION: ICD-10-CM

## 2023-05-25 DIAGNOSIS — Z98.84 S/P LAPAROSCOPIC SLEEVE GASTRECTOMY: ICD-10-CM

## 2023-05-25 DIAGNOSIS — E88.81 METABOLIC SYNDROME: ICD-10-CM

## 2023-05-25 DIAGNOSIS — Z98.84 S/P LAPAROSCOPIC SLEEVE GASTRECTOMY: Primary | ICD-10-CM

## 2023-05-25 LAB
25(OH)D3 SERPL-MCNC: 54.2 NG/ML
ALBUMIN SERPL-MCNC: 4.4 G/DL (ref 3.4–5)
ALBUMIN/GLOB SERPL: 1.6 {RATIO} (ref 1.1–2.2)
ALP SERPL-CCNC: 79 U/L (ref 40–129)
ALT SERPL-CCNC: 14 U/L (ref 10–40)
ANION GAP SERPL CALCULATED.3IONS-SCNC: 15 MMOL/L (ref 3–16)
AST SERPL-CCNC: 14 U/L (ref 15–37)
BASOPHILS # BLD: 0 K/UL (ref 0–0.2)
BASOPHILS NFR BLD: 0.7 %
BILIRUB SERPL-MCNC: 0.4 MG/DL (ref 0–1)
BUN SERPL-MCNC: 13 MG/DL (ref 7–20)
CALCIUM SERPL-MCNC: 10.3 MG/DL (ref 8.3–10.6)
CHLORIDE SERPL-SCNC: 102 MMOL/L (ref 99–110)
CHOLEST SERPL-MCNC: 236 MG/DL (ref 0–199)
CO2 SERPL-SCNC: 26 MMOL/L (ref 21–32)
CREAT SERPL-MCNC: 0.7 MG/DL (ref 0.6–1.1)
DEPRECATED RDW RBC AUTO: 13.7 % (ref 12.4–15.4)
EOSINOPHIL # BLD: 0.2 K/UL (ref 0–0.6)
EOSINOPHIL NFR BLD: 3.1 %
FOLATE SERPL-MCNC: >20 NG/ML (ref 4.78–24.2)
GFR SERPLBLD CREATININE-BSD FMLA CKD-EPI: >60 ML/MIN/{1.73_M2}
GLUCOSE SERPL-MCNC: 61 MG/DL (ref 70–99)
HCT VFR BLD AUTO: 44.6 % (ref 36–48)
HDLC SERPL-MCNC: 59 MG/DL (ref 40–60)
HGB BLD-MCNC: 14.7 G/DL (ref 12–16)
INR PPP: 1.02 (ref 0.84–1.16)
IRON SATN MFR SERPL: 37 % (ref 15–50)
IRON SERPL-MCNC: 92 UG/DL (ref 37–145)
LDLC SERPL CALC-MCNC: 158 MG/DL
LYMPHOCYTES # BLD: 1.5 K/UL (ref 1–5.1)
LYMPHOCYTES NFR BLD: 28.1 %
MCH RBC QN AUTO: 25.6 PG (ref 26–34)
MCHC RBC AUTO-ENTMCNC: 32.8 G/DL (ref 31–36)
MCV RBC AUTO: 77.9 FL (ref 80–100)
MONOCYTES # BLD: 0.3 K/UL (ref 0–1.3)
MONOCYTES NFR BLD: 5.3 %
NEUTROPHILS # BLD: 3.4 K/UL (ref 1.7–7.7)
NEUTROPHILS NFR BLD: 62.8 %
PLATELET # BLD AUTO: 248 K/UL (ref 135–450)
PMV BLD AUTO: 9 FL (ref 5–10.5)
POTASSIUM SERPL-SCNC: 4.5 MMOL/L (ref 3.5–5.1)
PROT SERPL-MCNC: 7.1 G/DL (ref 6.4–8.2)
PROTHROMBIN TIME: 13.4 SEC (ref 11.5–14.8)
RBC # BLD AUTO: 5.73 M/UL (ref 4–5.2)
SODIUM SERPL-SCNC: 143 MMOL/L (ref 136–145)
TIBC SERPL-MCNC: 246 UG/DL (ref 260–445)
TRIGL SERPL-MCNC: 97 MG/DL (ref 0–150)
TSH SERPL DL<=0.005 MIU/L-ACNC: 0.64 UIU/ML (ref 0.27–4.2)
VIT B12 SERPL-MCNC: 1260 PG/ML (ref 211–911)
VLDLC SERPL CALC-MCNC: 19 MG/DL
WBC # BLD AUTO: 5.4 K/UL (ref 4–11)

## 2023-05-25 PROCEDURE — 3074F SYST BP LT 130 MM HG: CPT | Performed by: SURGERY

## 2023-05-25 PROCEDURE — 99214 OFFICE O/P EST MOD 30 MIN: CPT | Performed by: SURGERY

## 2023-05-25 PROCEDURE — 3078F DIAST BP <80 MM HG: CPT | Performed by: SURGERY

## 2023-05-25 NOTE — PROGRESS NOTES
[facility]) Physicians   [employer]  Nevin Parikh MD, [facility], [address], [address] [place] [id number] . Phone: [phone]  Fax: [phone]            Chief Complaint   Patient presents with    Caney Poser Follow Up     6mo s/p sleeve 12/5/22           HPI:    Chintan Noe is a very pleasant 50 y.o.  female , Body mass index is 29.35 kg/m². Jodee Cruz And multiple medical problems who is presenting for bariatric follow up care. Schuyler Fontana is s/p laparoscopic sleeve gastrectomy by me 12/2022. Initial Weight: 253 lbs, Weight Loss: 82 lbs. Comes today to the clinic without any complaints. Patient denies any nausea, vomiting, fevers, chills, shortness of breath, chest pain, constipation or urinary symptoms. Denies any heartburn nor dysphagia. Patient informed us today that they are taking the multivitamins as instructed. Patient denies any tingling, weakness,  numbness nor any neurological symptoms. Schuyler Fontana is feeling very well, and is very active. Patient is very pleased with the weight loss and resolution of co-morbid conditions. 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 Ask me about. Hypertension     Localized osteoarthrosis, lower leg 2/17/2016    Mixed hyperlipidemia 8/18/2022    Obesity     Thyroid disease      Past Surgical History:   Procedure Laterality Date    BREAST BIOPSY      COLONOSCOPY  2006    Ask me about.     HIATAL HERNIA REPAIR N/A 12/5/2022    LAPAROSCOPIC HIATAL HERNIA REPAIR performed by Charles Dougherty MD at [address] [facility] N/A 12/5/2022    LAPAROSCOPIC SLEEVE GASTRECTOMY performed by Charles Dougherty MD at [facility] N/A 12/5/2022    LAPAROSCOPIC UMBILICAL HERNIA REPAIR performed by Michael Stephen

## 2023-05-25 NOTE — PROGRESS NOTES
Dietary Assessment Note      Vitals:   Vitals:    23 0858   BP: 125/73   Pulse: 76   Resp: 18   SpO2: 99%   Weight: 171 lb (77.6 kg)   Height: 5' 4\" (1.626 m)    Patient lost 16 lbs over 2 months. Total Weight Loss: 82 lbs    Labs reviewed: no lab studies available for review at time of visit    Protein intake: 65-70 grams/day     Fluid intake: 48-64 oz/day 3 bottles/day     Multivitamin/mineral intake: 1/day fusion capsule w/ iron    Calcium intake: 2/day fusion calcium chews     Other: folic acid     Exercise: PF + walking in park + bike riding 45 minutes 5X/week    Nutrition Assessment: 6 months post-op visit. Tracking with [place] ~800 kcal/day. Wake up 7:30 AM. Works at 9:30 AM-6:30 PM  Breakfast: 8-9 AM: [place] yogurt (25 grams protein)  Snack: 12 PM: 4 oz Turkey/cheese roll up + sometimes fruit   Lunch:  2:30 PM: Baby bell cheese + grapes   Snack: 6:30 PM: Baby bell cheese + grapes   Dinner: 8:30 PM: Grilled chix + broccoli   Snack: None  Bed around 11-11:30 PM, feels rested      Amount able to eat per sittin/2 cup     Following  rule: Eating over 30 minutes. Waits 30 minutes between eating and drinking.     Food Intolerances/issues: none    Client Concerns: none    Goals:   - Continue current exercise and eating patterns including protein and plants with all meals and snacks    Plan: Follow up at 8 months post op and as needed    Romulo Wagner RD, LD

## 2023-05-25 NOTE — PATIENT INSTRUCTIONS
Patient received dietary handouts and education.     Goals:   - Continue current exercise and eating patterns including protein and plants with all meals and snacks

## 2023-05-26 LAB
EST. AVERAGE GLUCOSE BLD GHB EST-MCNC: 114 MG/DL
HBA1C MFR BLD: 5.6 %

## 2023-05-28 LAB
A-TOCOPHEROL VIT E SERPL-MCNC: 11.5 MG/L (ref 5.5–18)
ANNOTATION COMMENT IMP: NORMAL
BETA+GAMMA TOCOPHEROL SERPL-MCNC: 1.4 MG/L (ref 0–6)
RETINYL PALMITATE SERPL-MCNC: 0.03 MG/L (ref 0–0.1)
VIT A SERPL-MCNC: 0.48 MG/L (ref 0.3–1.2)
VIT B1 BLD-MCNC: 203 NMOL/L (ref 70–180)

## 2023-07-05 RX ORDER — LEVOTHYROXINE SODIUM 0.1 MG/1
TABLET ORAL
Qty: 90 TABLET | Refills: 1 | Status: SHIPPED | OUTPATIENT
Start: 2023-07-05

## 2023-07-05 NOTE — TELEPHONE ENCOUNTER
.Refill Request     CONFIRM preferred pharmacy with the patient. If Mail Order Rx - Pend for 90 day refill. Last Seen: Last Seen Department: 4/18/2023  Last Seen by PCP: 4/18/2023    Last Written: 5-13-22 90 with 3     If no future appointment scheduled:  Review the last OV with PCP and review information for follow-up visit,  Route STAFF MESSAGE with patient name to the [facility] for scheduling with the following information:            -  Timing of next visit           -  Visit type ie Physical, OV, etc           -  Diagnoses/Reason ie. COPD, HTN - Do not use MEDICATION, Follow-up or CHECK UP - Give reason for visit      Next Appointment:   Future Appointments   Date Time Provider [address]   7/13/2023  9:00 AM MD Amaris Reina [facility]   4/19/2024  9:00 AM Yariel Dickey MD [facility]       Message sent to [address] [place] [address] to schedule appt with patient?   N/A      Requested Prescriptions     Pending Prescriptions Disp Refills    levothyroxine (SYNTHROID) 100 MCG tablet [Pharmacy Med Name: SYNTHROID TAB 100MCG] 90 tablet 1     Sig: TAKE 1 TABLET DAILY

## 2023-07-13 ENCOUNTER — OFFICE VISIT (OUTPATIENT)
Dept: BARIATRICS/WEIGHT MGMT | Age: 48
End: 2023-07-13
Payer: COMMERCIAL

## 2023-07-13 VITALS
OXYGEN SATURATION: 98 % | DIASTOLIC BLOOD PRESSURE: 70 MMHG | HEIGHT: 64 IN | RESPIRATION RATE: 18 BRPM | HEART RATE: 80 BPM | BODY MASS INDEX: 27.31 KG/M2 | WEIGHT: 160 LBS | SYSTOLIC BLOOD PRESSURE: 110 MMHG

## 2023-07-13 DIAGNOSIS — R73.03 PREDIABETES: ICD-10-CM

## 2023-07-13 DIAGNOSIS — E78.2 MIXED HYPERLIPIDEMIA: ICD-10-CM

## 2023-07-13 DIAGNOSIS — Z98.84 S/P LAPAROSCOPIC SLEEVE GASTRECTOMY: ICD-10-CM

## 2023-07-13 PROCEDURE — 3074F SYST BP LT 130 MM HG: CPT | Performed by: SURGERY

## 2023-07-13 PROCEDURE — 3078F DIAST BP <80 MM HG: CPT | Performed by: SURGERY

## 2023-07-13 PROCEDURE — 99214 OFFICE O/P EST MOD 30 MIN: CPT | Performed by: SURGERY

## 2023-07-13 NOTE — PROGRESS NOTES
Dietary Assessment Note      Vitals:   Vitals:    23 0902   BP: 110/70   Pulse: 80   Resp: 18   SpO2: 98%   Weight: 160 lb (72.6 kg)   Height: 5' 4\" (1.626 m)    Patient lost 11 lbs over 2 months. Total Weight Loss: 93 lbs    Labs reviewed: no lab studies available for review at time of visit    Protein intake: 70-75 grams/day same    Fluid intake: 48-64 oz/day 3 bottles/day     Multivitamin/mineral intake: 1/day fusion capsule w/ iron    Calcium intake: 2/day fusion calcium chews     Other:no    Exercise: PF + walking in park + bike riding 45 minutes 5X/week    Nutrition Assessment: 8 months post-op visit. Tracking with [employer] 59  N still about ~830 kcal/day. Wake up 7:30 AM. Works at 9:30 AM-6:30 PM  Breakfast: 8-9 AM: [place] yogurt (20-25 grams protein)  Snack: 12 PM: 4 oz Turkey/cheese roll up + sometimes fruit   Lunch:  2:30 PM: Baby bell cheese + grapes   Snack: 6:30 PM: Baby bell cheese + grapes   Some sweet cravings around 7-8   Dinner: 8:30 PM: Grilled chix + broccoli   Snack: nothing or low calorie breyer ice cream treat 140 felix , 2 g p, 11g carb, 5 g sugar, 11 g fat  Bed around 11-11:30 PM, feels rested      Amount able to eat per sittin/2 cup     Following 3030/30 rule: Eating over 30 minutes. Waits 30 minutes between eating and drinking.     Food Intolerances/issues: none    Client Concerns: none  Long term goal weight is 135-140    Goals:   - continue with current patterns  - suggest increasing calories by 150 cals per day   - consider increasing fluids on exercise days    Plan: Follow up at 8 months post op and as needed    Shiv Schafer, MS, RD, LD

## 2023-09-14 ENCOUNTER — OFFICE VISIT (OUTPATIENT)
Dept: BARIATRICS/WEIGHT MGMT | Age: 48
End: 2023-09-14
Payer: COMMERCIAL

## 2023-09-14 VITALS
HEIGHT: 64 IN | HEART RATE: 71 BPM | RESPIRATION RATE: 18 BRPM | SYSTOLIC BLOOD PRESSURE: 100 MMHG | OXYGEN SATURATION: 98 % | BODY MASS INDEX: 24.92 KG/M2 | WEIGHT: 146 LBS | DIASTOLIC BLOOD PRESSURE: 68 MMHG

## 2023-09-14 DIAGNOSIS — E78.2 MIXED HYPERLIPIDEMIA: ICD-10-CM

## 2023-09-14 DIAGNOSIS — Z98.84 S/P LAPAROSCOPIC SLEEVE GASTRECTOMY: Primary | ICD-10-CM

## 2023-09-14 PROCEDURE — 99214 OFFICE O/P EST MOD 30 MIN: CPT | Performed by: SURGERY

## 2023-09-14 NOTE — PROGRESS NOTES
Dietary Assessment Note      Vitals:   Vitals:    23 0902   Pulse: 71   Resp: 18   SpO2: 98%   Weight: 146 lb (66.2 kg)   Height: 5' 4\" (1.626 m)      Patient lost 14 lbs over 2 months. Total Weight Loss: 107 lbs    Labs reviewed: no lab studies available for review at time of visit    Protein intake: 70-75 grams/day still tracking in [employer] [facility]    Fluid intake: 48-64 oz/day 3 bottles/day      Multivitamin/mineral intake: 1/day fusion capsule w/ iron     Calcium intake: 2/day fusion calcium chews     Other: no    Exercise: PF + walking in park + bike riding 30 minutes 5x per week  Pt is resistance    Nutrition Assessment: 10 months post-op visit. Tracking with [employer] 59  N still about ~850 kcal/day. Recall:   Wake up 7:30 AM. Works at 9:30 AM-6:30 PM  Breakfast: 8-9 AM: Ratio yogurt (20-25 grams protein)  Snack: 12 PM: 4 oz Turkey/cheese roll up + fruit   Lunch:  2:30 PM: Baby bell cheese + grapes   Snack: 6:30 PM: Baby bell cheese + sometimes grapes    Walks around 7pm  Less sweet cravings around 7-8 if so will have   sf pudding about 8pm  Dinner: 8:30 PM: Grilled chix + broccoli   Snack: twice per week breyers low carb treat, otherwise nothing    Bed around 11-11:30 PM, feels rested      Amount able to eat per sittin/2 cup     Following 3030/30 rule: Eating over 30 minutes. Waits 30 minutes between eating and drinking.     Food Intolerances/issues: none    Client Concerns: none  Long term goal weight is 135-140     Excess skin - some infections irritation in belly button and lower abdomen fold     Goals:   -1100 felix 60-80g protein - suggest increasing calories by 150 cals per day   - continue with current eating patterns   - consider increasing fluids on exercise days    Plan: Follow up at 12 months post op and as needed    Wyatt Schreiber, MS, RD, LD

## 2023-09-14 NOTE — PATIENT INSTRUCTIONS
Patient received dietary handouts and education.    -1100 felix 60-80g protein - suggest increasing calories by 150 cals per day   - continue with current eating patterns   - consider increasing fluids on exercise days

## 2023-09-22 ENCOUNTER — E-VISIT (OUTPATIENT)
Dept: PRIMARY CARE CLINIC | Age: 48
End: 2023-09-22
Payer: COMMERCIAL

## 2023-09-22 DIAGNOSIS — B37.2 CANDIDIASIS, INTERTRIGO: Primary | ICD-10-CM

## 2023-09-22 PROCEDURE — 99421 OL DIG E/M SVC 5-10 MIN: CPT | Performed by: NURSE PRACTITIONER

## 2023-09-24 RX ORDER — NYSTATIN 100000 [USP'U]/G
POWDER TOPICAL
Qty: 30 G | Refills: 1 | Status: SHIPPED | OUTPATIENT
Start: 2023-09-24

## 2023-10-06 RX ORDER — ASPIRIN 81 MG/1
TABLET, COATED ORAL
Qty: 30 TABLET | Refills: 0 | Status: SHIPPED | OUTPATIENT
Start: 2023-10-06

## 2023-10-16 RX ORDER — SPIRONOLACTONE 25 MG/1
TABLET ORAL
Qty: 90 TABLET | Refills: 1 | Status: SHIPPED | OUTPATIENT
Start: 2023-10-16

## 2023-10-16 NOTE — TELEPHONE ENCOUNTER
Refill Request     CONFIRM preferred pharmacy with the patient. If Mail Order Rx - Pend for 90 day refill. Last Seen: Last Seen Department: 4/18/2023  Last Seen by PCP: 4/18/2023    Last Written: 02/26/23 90 with 1 refills    If no future appointment scheduled:  Review the last OV with PCP and review information for follow-up visit,  Route STAFF MESSAGE with patient name to the [facility] for scheduling with the following information:            -  Timing of next visit           -  Visit type ie Physical, OV, etc           -  Diagnoses/Reason ie. COPD, HTN - Do not use MEDICATION, Follow-up or CHECK UP - Give reason for visit      Next Appointment:   Future Appointments   Date Time Provider [facility] [address]   11/16/2023  9:00 AM Mulu Carranza MD [facility]   4/19/2024  9:00 AM Chula Quach MD [facility] [place] [facility] [place]       Message sent to [address] [place] [address] to schedule appt with patient?   NO      Requested Prescriptions     Pending Prescriptions Disp Refills    spironolactone (ALDACTONE) 25 MG tablet [Pharmacy Med Name: Cha Sharma  TAB 25MG] 90 tablet 1     Sig: TAKE 1 TABLET DAILY

## 2023-11-01 NOTE — TELEPHONE ENCOUNTER
Last OV:10/2/2022  No upcoming OV  CMP:5/25/2023          FRONT- pt needs appt to get refills on medications

## 2023-11-06 NOTE — TELEPHONE ENCOUNTER
11/6- called pt @[phone]. Unable to make contact. Unable to leave vm. Mail box full. Will try again at a later date.

## 2023-11-06 NOTE — TELEPHONE ENCOUNTER
Last OV: 10/2/2022  No upcoming OV  CMP:5/25/2023      FRONT- Pt needs appt to get medication refill

## 2023-11-07 NOTE — TELEPHONE ENCOUNTER
11/7- called pt @@[phone]. Unable to make contact. Unable to LVM- Inbox is full. Pt needs apt w/ [facility] for medication refill.

## 2023-11-08 NOTE — TELEPHONE ENCOUNTER
11/8- Second attempt. Called pt @[phone]. Unable to make contact. Unable to leave vm. Mail box full. Will try again at a later date.

## 2023-11-08 NOTE — TELEPHONE ENCOUNTER
FYI There are two encounters for the pt regarding her aspirin. Will continue updating the newest encounter on  board. Will not continue responding to this message.     Janet Noe    11/8/23  9:31 AM  Note     11/8- Second attempt. Called pt @[phone]. Unable to make contact. Unable to leave vm. Mail box full. Will try again at a later date.         November 6, 2023  Janet Noe    11/6/23 11:00 AM  Note     11/6- called pt @[phone]. Unable to make contact. Unable to leave vm. Mail box full. Will try again at a later date.

## 2023-11-10 NOTE — TELEPHONE ENCOUNTER
11/10- Third attempt. Called pt @[phone]. Unable to make contact. Unable to leave . Mail box full. Sent letter.

## 2023-11-13 RX ORDER — ASPIRIN 81 MG/1
81 TABLET ORAL DAILY
Qty: 30 TABLET | Refills: 0 | OUTPATIENT
Start: 2023-11-13

## 2023-11-14 RX ORDER — ASPIRIN 81 MG/1
TABLET, COATED ORAL
Qty: 30 TABLET | Refills: 0 | Status: SHIPPED | OUTPATIENT
Start: 2023-11-14

## 2023-11-16 ENCOUNTER — OFFICE VISIT (OUTPATIENT)
Dept: BARIATRICS/WEIGHT MGMT | Age: 48
End: 2023-11-16

## 2023-11-16 VITALS
WEIGHT: 140 LBS | SYSTOLIC BLOOD PRESSURE: 104 MMHG | DIASTOLIC BLOOD PRESSURE: 62 MMHG | HEART RATE: 51 BPM | RESPIRATION RATE: 18 BRPM | HEIGHT: 64 IN | OXYGEN SATURATION: 96 % | BODY MASS INDEX: 23.9 KG/M2

## 2023-11-16 DIAGNOSIS — Z98.84 S/P LAPAROSCOPIC SLEEVE GASTRECTOMY: ICD-10-CM

## 2023-11-16 DIAGNOSIS — R23.8 SKIN IRRITATION: ICD-10-CM

## 2023-11-16 NOTE — PROGRESS NOTES
Dietary Assessment Note      Vitals:   Vitals:    11/16/23 0909   BP: 104/62   Pulse: 51   Resp: 18   SpO2: 96%   Weight: 63.5 kg (140 lb)   Height: 1.626 m (5' 4\")    Patient lost 6 lbs over past 2 months. Total Weight Loss: 113 lbs    Labs reviewed: No new nutrition related labs      Protein intake: 60-80 grams/day sometimes over 80    Fluid intake: 48-64 oz/day    Multivitamin/mineral intake: 1 Fusion capsule with Fe     Calcium intake:  2 Fusion chews     Other: none    Exercise: Walking sometimes     Nutrition Assessment: ~ 1 year s/p sleeve post-op visit. Still tracks with the [facility]cia and gets ~ 1000 calories per day. Breakfast: ratio 25 protein yogurt     Snack: nothing     Lunch: chicken salad (no bread) with grapes/apple and babybel cheese     Snack: handful of nuts OR string/babybel cheese OR both     Dinner: chicken breast with broccoli/green beans/cauliflower     Snack: klondike KWESI ice cream 2 x week OR nothing     Fluids: Drinks decaf iced tea with sweet n low and water, decaf coffee with sweet n low and SF creamer     Amount able to eat per sitting: 3.5-4 oz of protein and 1/4 cup of side     Following 30/30/30 rule: Eating over 30 minutes. Waits 30 minutes between eating and drinking.      Food Intolerances/issues: none     Client Concerns: wants to lose another 5 lbs but overall comfortable with CBW     Goals:   Aim for 1200 calories   Aim for 3 oz protein and 1/2 cup of side   Try to continue walking during the winter     Plan: F/U per Provider     Michael Erickson, MCKINLEY, LD

## 2023-11-27 NOTE — TELEPHONE ENCOUNTER
.Refill Request     CONFIRM preferred pharmacy with the patient. If Mail Order Rx - Pend for 90 day refill. Last Seen: Last Seen Department: 4/18/2023  Last Seen by PCP: 4/18/2023    Last Written: 7-5-23 90 with 1     If no future appointment scheduled:  Review the last OV with PCP and review information for follow-up visit,  Route STAFF MESSAGE with patient name to the [facility] for scheduling with the following information:            -  Timing of next visit           -  Visit type ie Physical, OV, etc           -  Diagnoses/Reason ie. COPD, HTN - Do not use MEDICATION, Follow-up or CHECK UP - Give reason for visit      Next Appointment:   Future Appointments   Date Time Provider [address]   4/19/2024  9:00 AM MD MALCOM Gomez  [place] [facility]   5/9/2024  9:00 AM MD Thomas Davis [facility]       Message sent to [address] [place] [address] to schedule appt with patient?   N/A      Requested Prescriptions     Pending Prescriptions Disp Refills    levothyroxine (SYNTHROID) 100 MCG tablet [Pharmacy Med Name: SYNTHROID TAB 100MCG] 90 tablet 1     Sig: TAKE 1 TABLET DAILY

## 2023-11-29 RX ORDER — LEVOTHYROXINE SODIUM 0.1 MG/1
TABLET ORAL
Qty: 90 TABLET | Refills: 1 | Status: SHIPPED | OUTPATIENT
Start: 2023-11-29

## 2024-02-13 NOTE — PROGRESS NOTES
panniculectomy with mastopexy for functional improvement. Additionally, would benefit from auto-augmentation for improvement in volume without implant as well as FdL abdominoplasty. Will provide cosmetic pricing and submit to insurance and then schedule.    The complexity of body contouring procedures and wound healing physiology were discussed with the patient.  She was counseled on ideal medical optimization (nutrition, weight stability, etc.).  We also discussed the pros & cons of staging for multiple procedures including controlling tension from various sites and postoperative care managment. Clinical photos were obtained. The risks, benefits, alternatives, outcomes, personnel involved were discussed and all questions were answered in a satisfactory manner according to the patient. Specifically,the risks including, but not limited to: bleeding possibly requiring transfusion orreoperation, infection, seroma, nonhealing of wounds, poor cosmetic outcome, scarring, VTE (DVT/PE), and death were discussed.      Akira Schaffer MD  [facility]  [phone]  02/14/24

## 2024-02-14 ENCOUNTER — OFFICE VISIT (OUTPATIENT)
Dept: SURGERY | Age: 49
End: 2024-02-14
Payer: COMMERCIAL

## 2024-02-14 VITALS
HEART RATE: 70 BPM | BODY MASS INDEX: 24.24 KG/M2 | HEIGHT: 64 IN | OXYGEN SATURATION: 100 % | RESPIRATION RATE: 16 BRPM | TEMPERATURE: 98.2 F | DIASTOLIC BLOOD PRESSURE: 76 MMHG | SYSTOLIC BLOOD PRESSURE: 125 MMHG | WEIGHT: 142 LBS

## 2024-02-14 DIAGNOSIS — N64.81 BREAST PTOSIS: ICD-10-CM

## 2024-02-14 DIAGNOSIS — M79.3 PANNICULITIS: Primary | ICD-10-CM

## 2024-02-14 PROCEDURE — 99204 OFFICE O/P NEW MOD 45 MIN: CPT | Performed by: SURGERY

## 2024-02-14 RX ORDER — HEPARIN SODIUM 5000 [USP'U]/ML
5000 INJECTION, SOLUTION INTRAVENOUS; SUBCUTANEOUS
OUTPATIENT
Start: 2024-02-14 | End: 2024-02-14

## 2024-02-14 RX ORDER — SODIUM CHLORIDE 9 MG/ML
INJECTION, SOLUTION INTRAVENOUS CONTINUOUS
OUTPATIENT
Start: 2024-02-14

## 2024-02-14 RX ORDER — SODIUM CHLORIDE 9 MG/ML
INJECTION, SOLUTION INTRAVENOUS PRN
OUTPATIENT
Start: 2024-02-14

## 2024-02-14 RX ORDER — SODIUM CHLORIDE 0.9 % (FLUSH) 0.9 %
5-40 SYRINGE (ML) INJECTION PRN
OUTPATIENT
Start: 2024-02-14

## 2024-02-14 RX ORDER — SODIUM CHLORIDE 0.9 % (FLUSH) 0.9 %
5-40 SYRINGE (ML) INJECTION EVERY 12 HOURS SCHEDULED
OUTPATIENT
Start: 2024-02-14

## 2024-02-15 ENCOUNTER — TELEPHONE (OUTPATIENT)
Dept: SURGERY | Age: 49
End: 2024-02-15

## 2024-02-15 NOTE — TELEPHONE ENCOUNTER
The patient was in the office to see Dr. Schaffer yesterday.    PLAN: Discussed options with Bita.  Would benefit from panniculectomy with mastopexy for functional improvement. Additionally, would benefit from auto-augmentation for improvement in volume without implant as well as FdL abdominoplasty. Will provide cosmetic pricing and submit to insurance and then schedule.     I received a surgery letter.     I lmom for the patient at the home number listed. I requested a call back to discuss needed medical records.     I will leave this phone note open.

## 2024-02-16 NOTE — TELEPHONE ENCOUNTER
The patient returned my call mentioned below. We discussed needed medical records in regards to issues with excess skin under her pannus.  She stated that she has discussed the issues and been treated by the office of her PCP (Kayla Perez MD) She is aware that I can view the records in Epic and print anything that might be helpful. The patient has photos of her infected belly button and uses the medication prescribed following her visit on 9-. The patient was provided an insurance update.     I spoke with Roxanna SILVERIO at [facility] ([phone]) to see if CPT Code 09382 or 82302 require pre certification. The codes do not require pre certification, but pre determination is recommended, which I started during our call. I will submit clinicals via the [facility] provider portal. The clinicals that I submit are saved on my computer.     Pending Case ID # [id number]    Date Range 3- to 3-    I will leave this phone note open.

## 2024-02-23 NOTE — TELEPHONE ENCOUNTER
I received a fax from [facility] dated 2- asking that colored photos be emailed to [email or web address]. DONE.    I will leave this phone note open.

## 2024-03-06 NOTE — TELEPHONE ENCOUNTER
I spoke with Brandi NOLEN at [facility] ([phone]) to follow up on Pending Case ID # [id number]. She stated that the case is PENDING. She verified that they received clinicals and colored photos as of 2-. She stated that the turn around time is 15 calendar days.     Call Reference # [id number]     I will leave this phone note open.

## 2024-03-14 NOTE — TELEPHONE ENCOUNTER
I spoke with Mayela NGUYEN at [facility] ([phone]) to follow up on Pending Case ID # [id number]. She stated that the case is PENDING. She verified that they received clinicals and colored photos as of 2-. She stated that the turn around time is 15 calendar days. However, today is day 20. She stated that the case was VOIDED on 2- and she has reopened it today. I asked that the case be EXPEDITED and explained that this is unacceptable.      Call Reference # [id number]     I will leave this phone note open.

## 2024-03-19 NOTE — TELEPHONE ENCOUNTER
Patient returning Ivett's phone call, she said it would be okay for Ivett to leave a detailed voicemail or if she can call her back in the morning prior to 9 am that would work too. She works 930-6pm

## 2024-03-19 NOTE — TELEPHONE ENCOUNTER
I spoke with Gabino OMALLEY () at [facility] ([phone]) to follow up on Pending Case ID # [id number]. Gabino stated that the case was VOIDED again on 3- due to poor quality photos.     I need to obtain new pictures and then submit via email to reopen the case.     I lmom for the patient at the home number listed. I requested a call back to discuss next steps.    I will leave this phone note open.

## 2024-03-20 NOTE — TELEPHONE ENCOUNTER
I spoke with the patient at the home number listed to discuss the insurance information below. We dicussed the need for new pictures as mentioned below. The patient will stop into the office on 4-8-2024 to have new pictures taken. The patient does not need an appointment and will ask for me when she arrives.     I will leave this phone note open.

## 2024-04-09 NOTE — TELEPHONE ENCOUNTER
I spoke with Stefany NUNES at [facility] ([phone]) to follow up on Pending Case ID # [id number] to discuss the updated colored photos that I have. She stated that I can submit new photos only under the same case number and the case will be reopened. She asked that I upload the photos into the [facility] portal. The pictures that I attached are saved on my computer.     Call Reference # [id number]    I lmom for the patient at the home number listed to provide an insurance update.     I will leave this phone note open.

## 2024-04-12 RX ORDER — SPIRONOLACTONE 25 MG/1
TABLET ORAL
Qty: 90 TABLET | Refills: 0 | Status: SHIPPED | OUTPATIENT
Start: 2024-04-12

## 2024-04-12 NOTE — TELEPHONE ENCOUNTER
Refill Request     CONFIRM preferred pharmacy with the patient.    If Mail Order Rx - Pend for 90 day refill.      Last Seen: Last Seen Department: 4/18/2023  Last Seen by PCP: Visit date not found    Last Written: 10/16/23 90 with 1 refill     If no future appointment scheduled:  Review the last OV with PCP and review information for follow-up visit,  Route STAFF MESSAGE with patient name to the  Pool for scheduling with the following information:            -  Timing of next visit           -  Visit type ie Physical, OV, etc           -  Diagnoses/Reason ie. COPD, HTN - Do not use MEDICATION, Follow-up or CHECK UP - Give reason for visit      Next Appointment:   Future Appointments   Date Time Provider Department Center   4/19/2024  9:00 AM Kayla Perez MD [facility]   5/9/2024  9:00 AM Charly Magana MD [facility]       Message sent to  to schedule appt with patient?  NO      Requested Prescriptions     Pending Prescriptions Disp Refills    spironolactone (ALDACTONE) 25 MG tablet [Pharmacy Med Name: SPIRONOLACT  TAB 25MG] 90 tablet 1     Sig: TAKE 1 TABLET DAILY

## 2024-04-16 ASSESSMENT — PATIENT HEALTH QUESTIONNAIRE - PHQ9
3. TROUBLE FALLING OR STAYING ASLEEP: NOT AT ALL
SUM OF ALL RESPONSES TO PHQ QUESTIONS 1-9: 0
5. POOR APPETITE OR OVEREATING: NOT AT ALL
1. LITTLE INTEREST OR PLEASURE IN DOING THINGS: NOT AT ALL
4. FEELING TIRED OR HAVING LITTLE ENERGY: NOT AT ALL
2. FEELING DOWN, DEPRESSED OR HOPELESS: NOT AT ALL
SUM OF ALL RESPONSES TO PHQ9 QUESTIONS 1 & 2: 0
SUM OF ALL RESPONSES TO PHQ9 QUESTIONS 1 & 2: 0
9. THOUGHTS THAT YOU WOULD BE BETTER OFF DEAD, OR OF HURTING YOURSELF: NOT AT ALL
1. LITTLE INTEREST OR PLEASURE IN DOING THINGS: NOT AT ALL
SUM OF ALL RESPONSES TO PHQ QUESTIONS 1-9: 0
2. FEELING DOWN, DEPRESSED OR HOPELESS: NOT AT ALL
10. IF YOU CHECKED OFF ANY PROBLEMS, HOW DIFFICULT HAVE THESE PROBLEMS MADE IT FOR YOU TO DO YOUR WORK, TAKE CARE OF THINGS AT HOME, OR GET ALONG WITH OTHER PEOPLE: NOT DIFFICULT AT ALL
8. MOVING OR SPEAKING SO SLOWLY THAT OTHER PEOPLE COULD HAVE NOTICED. OR THE OPPOSITE, BEING SO FIGETY OR RESTLESS THAT YOU HAVE BEEN MOVING AROUND A LOT MORE THAN USUAL: NOT AT ALL
7. TROUBLE CONCENTRATING ON THINGS, SUCH AS READING THE NEWSPAPER OR WATCHING TELEVISION: NOT AT ALL

## 2024-04-18 NOTE — TELEPHONE ENCOUNTER
I spoke with Roxanna SILVERIO at [facility] ([phone]) to follow up on Pending Case ID # [id number] to discuss the updated colored photos that I have. She stated that she shows the case VOIDED. I advised that new colored pictures were uploaded on 4-9-2024 and that I was told the case would be reopened. She can see the clinicals and updated colored pictures. She stated that she will reopen the case and assigned the department. I asked that the case be EXPEDITED.      Call Reference # [id number]     I will leave this phone note open.

## 2024-04-19 ENCOUNTER — OFFICE VISIT (OUTPATIENT)
Dept: FAMILY MEDICINE CLINIC | Age: 49
End: 2024-04-19
Payer: COMMERCIAL

## 2024-04-19 VITALS
BODY MASS INDEX: 24.07 KG/M2 | HEIGHT: 64 IN | TEMPERATURE: 97.1 F | WEIGHT: 141 LBS | SYSTOLIC BLOOD PRESSURE: 110 MMHG | OXYGEN SATURATION: 99 % | HEART RATE: 65 BPM | DIASTOLIC BLOOD PRESSURE: 70 MMHG

## 2024-04-19 DIAGNOSIS — R23.8 SKIN IRRITATION: ICD-10-CM

## 2024-04-19 DIAGNOSIS — B37.2 CANDIDAL INTERTRIGO: ICD-10-CM

## 2024-04-19 DIAGNOSIS — Z00.00 ROUTINE GENERAL MEDICAL EXAMINATION AT A HEALTH CARE FACILITY: Primary | ICD-10-CM

## 2024-04-19 DIAGNOSIS — Z98.84 S/P LAPAROSCOPIC SLEEVE GASTRECTOMY: ICD-10-CM

## 2024-04-19 LAB
25(OH)D3 SERPL-MCNC: 60.4 NG/ML
ALBUMIN SERPL-MCNC: 4.9 G/DL (ref 3.4–5)
ALBUMIN/GLOB SERPL: 2 {RATIO} (ref 1.1–2.2)
ALP SERPL-CCNC: 59 U/L (ref 40–129)
ALT SERPL-CCNC: 17 U/L (ref 10–40)
ANION GAP SERPL CALCULATED.3IONS-SCNC: 10 MMOL/L (ref 3–16)
AST SERPL-CCNC: 15 U/L (ref 15–37)
BASOPHILS # BLD: 0 K/UL (ref 0–0.2)
BASOPHILS NFR BLD: 0.8 %
BILIRUB SERPL-MCNC: 0.5 MG/DL (ref 0–1)
BUN SERPL-MCNC: 18 MG/DL (ref 7–20)
CALCIUM SERPL-MCNC: 10.6 MG/DL (ref 8.3–10.6)
CHLORIDE SERPL-SCNC: 100 MMOL/L (ref 99–110)
CHOLEST SERPL-MCNC: 333 MG/DL (ref 0–199)
CO2 SERPL-SCNC: 28 MMOL/L (ref 21–32)
CREAT SERPL-MCNC: 0.6 MG/DL (ref 0.6–1.1)
DEPRECATED RDW RBC AUTO: 13.3 % (ref 12.4–15.4)
EOSINOPHIL # BLD: 0.1 K/UL (ref 0–0.6)
EOSINOPHIL NFR BLD: 2.7 %
FOLATE SERPL-MCNC: >20 NG/ML (ref 4.78–24.2)
GFR SERPLBLD CREATININE-BSD FMLA CKD-EPI: >90 ML/MIN/{1.73_M2}
GLUCOSE SERPL-MCNC: 81 MG/DL (ref 70–99)
HCT VFR BLD AUTO: 41.9 % (ref 36–48)
HDLC SERPL-MCNC: 94 MG/DL (ref 40–60)
HGB BLD-MCNC: 14.3 G/DL (ref 12–16)
INR PPP: 0.97 (ref 0.85–1.15)
IRON SATN MFR SERPL: 38 % (ref 15–50)
IRON SERPL-MCNC: 114 UG/DL (ref 37–145)
LDLC SERPL CALC-MCNC: 220 MG/DL
LYMPHOCYTES # BLD: 1.8 K/UL (ref 1–5.1)
LYMPHOCYTES NFR BLD: 34.1 %
MCH RBC QN AUTO: 26.9 PG (ref 26–34)
MCHC RBC AUTO-ENTMCNC: 34.1 G/DL (ref 31–36)
MCV RBC AUTO: 78.9 FL (ref 80–100)
MONOCYTES # BLD: 0.2 K/UL (ref 0–1.3)
MONOCYTES NFR BLD: 4.7 %
NEUTROPHILS # BLD: 3 K/UL (ref 1.7–7.7)
NEUTROPHILS NFR BLD: 57.7 %
PLATELET # BLD AUTO: 234 K/UL (ref 135–450)
PMV BLD AUTO: 8.2 FL (ref 5–10.5)
POTASSIUM SERPL-SCNC: 3.8 MMOL/L (ref 3.5–5.1)
PROT SERPL-MCNC: 7.4 G/DL (ref 6.4–8.2)
PROTHROMBIN TIME: 13.1 SEC (ref 11.9–14.9)
RBC # BLD AUTO: 5.31 M/UL (ref 4–5.2)
SODIUM SERPL-SCNC: 138 MMOL/L (ref 136–145)
TIBC SERPL-MCNC: 299 UG/DL (ref 260–445)
TRIGL SERPL-MCNC: 94 MG/DL (ref 0–150)
TSH SERPL DL<=0.005 MIU/L-ACNC: 2.35 UIU/ML (ref 0.27–4.2)
VIT B12 SERPL-MCNC: >2000 PG/ML (ref 211–911)
VLDLC SERPL CALC-MCNC: 19 MG/DL
WBC # BLD AUTO: 5.2 K/UL (ref 4–11)

## 2024-04-19 PROCEDURE — 99396 PREV VISIT EST AGE 40-64: CPT | Performed by: FAMILY MEDICINE

## 2024-04-19 SDOH — ECONOMIC STABILITY: FOOD INSECURITY: WITHIN THE PAST 12 MONTHS, YOU WORRIED THAT YOUR FOOD WOULD RUN OUT BEFORE YOU GOT MONEY TO BUY MORE.: NEVER TRUE

## 2024-04-19 SDOH — ECONOMIC STABILITY: FOOD INSECURITY: WITHIN THE PAST 12 MONTHS, THE FOOD YOU BOUGHT JUST DIDN'T LAST AND YOU DIDN'T HAVE MONEY TO GET MORE.: NEVER TRUE

## 2024-04-19 SDOH — ECONOMIC STABILITY: INCOME INSECURITY: HOW HARD IS IT FOR YOU TO PAY FOR THE VERY BASICS LIKE FOOD, HOUSING, MEDICAL CARE, AND HEATING?: NOT HARD AT ALL

## 2024-04-19 SDOH — ECONOMIC STABILITY: HOUSING INSECURITY
IN THE LAST 12 MONTHS, WAS THERE A TIME WHEN YOU DID NOT HAVE A STEADY PLACE TO SLEEP OR SLEPT IN A SHELTER (INCLUDING NOW)?: NO

## 2024-04-19 ASSESSMENT — ANXIETY QUESTIONNAIRES
7. FEELING AFRAID AS IF SOMETHING AWFUL MIGHT HAPPEN: NOT AT ALL
GAD7 TOTAL SCORE: 3
1. FEELING NERVOUS, ANXIOUS, OR ON EDGE: SEVERAL DAYS
4. TROUBLE RELAXING: SEVERAL DAYS
5. BEING SO RESTLESS THAT IT IS HARD TO SIT STILL: NOT AT ALL
6. BECOMING EASILY ANNOYED OR IRRITABLE: NOT AT ALL
3. WORRYING TOO MUCH ABOUT DIFFERENT THINGS: SEVERAL DAYS
IF YOU CHECKED OFF ANY PROBLEMS ON THIS QUESTIONNAIRE, HOW DIFFICULT HAVE THESE PROBLEMS MADE IT FOR YOU TO DO YOUR WORK, TAKE CARE OF THINGS AT HOME, OR GET ALONG WITH OTHER PEOPLE: NOT DIFFICULT AT ALL
2. NOT BEING ABLE TO STOP OR CONTROL WORRYING: NOT AT ALL

## 2024-04-19 NOTE — TELEPHONE ENCOUNTER
I spoke with Blane at [facility] ([phone]) to follow up on Pending Case ID # [id number]. The case is currently under REVIEW and clinicals and colored pictures have been received. The case has been ESCALATED.      Call Reference # [id number]     I will leave this phone note open.

## 2024-04-19 NOTE — PROGRESS NOTES
Well Adult Note  Name: Bita Brady Today’s Date: 2024   MRN: [id number] Sex: Female   Age: 48 y.o. Ethnicity: Non- / Non    : 1975 Race: White (non-)      Bita Brady is here for well adult exam.  History:  She has lost over 110 lbs since having sleeve gastrectomy. She is very pleased with her results, but reports since she has lost weight, the excess skin on her abdomen has led to recurrent rashes in her umbilicus as well as folds under her pannus. She is currently using nystatin powder, which has helped, but problem continues to reoccur frequently, at least every few days. She has had an evaluation for skin removal and is awaiting word back on insurance approval.     Review of Systems   Constitutional: Negative.    HENT: Negative.     Eyes: Negative.    Respiratory: Negative.     Cardiovascular: Negative.    Gastrointestinal: Negative.    Genitourinary: Negative.    Musculoskeletal: Negative.    Skin:  Positive for rash.   Neurological: Negative.    Psychiatric/Behavioral: Negative.         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 Authorizing Provider   spironolactone (ALDACTONE) 25 MG tablet TAKE 1 TABLET DAILY Yes Kayla Perez MD   levothyroxine (SYNTHROID) 100 MCG tablet TAKE 1 TABLET DAILY Yes Kayla Perez MD   ASPIRIN LOW DOSE 81 MG EC tablet TAKE 1 TABLET BY MOUTH EVERY DAY *MAKE APPOINTMENT FOR MORE REFILLS* Yes Oliverio Cantrell MD   nystatin (MYCOSTATIN) 362771 UNIT/GM powder Apply topically 4 times daily. Yes Luz Marina Garay APRN         Past Medical History:   Diagnosis Date    Chronic GERD 2022    Chronic GERD 2022    Disease of both eyes characterized by increased eye pressure 2018    Essential hypertension 10/30/2011    Glaucoma     Have a Cupped Optic Nerve.  Ask

## 2024-04-20 PROBLEM — E66.01 SEVERE OBESITY (BMI 35.0-39.9) WITH COMORBIDITY (HCC): Status: RESOLVED | Noted: 2022-12-05 | Resolved: 2024-04-20

## 2024-04-20 PROBLEM — E66.01 MORBID OBESITY WITH BMI OF 40.0-44.9, ADULT (HCC): Status: RESOLVED | Noted: 2019-04-18 | Resolved: 2024-04-20

## 2024-04-20 LAB
EST. AVERAGE GLUCOSE BLD GHB EST-MCNC: 105.4 MG/DL
HBA1C MFR BLD: 5.3 %

## 2024-04-20 ASSESSMENT — ENCOUNTER SYMPTOMS
RESPIRATORY NEGATIVE: 1
GASTROINTESTINAL NEGATIVE: 1
EYES NEGATIVE: 1

## 2024-04-22 LAB — VIT B1 BLD-MCNC: 195 NMOL/L (ref 70–180)

## 2024-04-23 LAB
A-TOCOPHEROL VIT E SERPL-MCNC: 16.4 MG/L (ref 5.5–18)
ANNOTATION COMMENT IMP: NORMAL
BETA+GAMMA TOCOPHEROL SERPL-MCNC: 1 MG/L (ref 0–6)
RETINYL PALMITATE SERPL-MCNC: 0.04 MG/L (ref 0–0.1)
VIT A SERPL-MCNC: 0.88 MG/L (ref 0.3–1.2)

## 2024-05-06 RX ORDER — ATORVASTATIN CALCIUM 40 MG/1
40 TABLET, FILM COATED ORAL DAILY
Qty: 90 TABLET | Refills: 1 | Status: SHIPPED | OUTPATIENT
Start: 2024-05-06

## 2024-05-09 ENCOUNTER — OFFICE VISIT (OUTPATIENT)
Dept: BARIATRICS/WEIGHT MGMT | Age: 49
End: 2024-05-09
Payer: COMMERCIAL

## 2024-05-09 VITALS
RESPIRATION RATE: 18 BRPM | HEIGHT: 64 IN | DIASTOLIC BLOOD PRESSURE: 66 MMHG | HEART RATE: 77 BPM | OXYGEN SATURATION: 99 % | WEIGHT: 142 LBS | BODY MASS INDEX: 24.24 KG/M2 | SYSTOLIC BLOOD PRESSURE: 110 MMHG

## 2024-05-09 DIAGNOSIS — Z98.84 S/P LAPAROSCOPIC SLEEVE GASTRECTOMY: ICD-10-CM

## 2024-05-09 PROCEDURE — 99214 OFFICE O/P EST MOD 30 MIN: CPT | Performed by: SURGERY

## 2024-05-09 NOTE — PROGRESS NOTES
Dietary Assessment Note      Vitals:   Vitals:    05/09/24 0855   Resp: 18   Weight: 64.4 kg (142 lb)   Height: 1.626 m (5' 4\")    Patient gained 2 lbs over 6 mos.    Total Weight Loss: 111 lbs    Labs reviewed:    Latest Reference Range & Units 04/19/24 09:58   Vitamin B-12 211 - 911 pg/mL >2000 (H)   (H): Data is abnormally high   Latest Reference Range & Units 04/19/24 09:58   Cholesterol, Total 0 - 199 mg/dL 333 (H)   HDL Cholesterol 40 - 60 mg/dL 94 (H)   LDL Cholesterol <100 mg/dL 220 (H)   (H): Data is abnormally high   Latest Reference Range & Units 04/19/24 09:58   Vitamin B1,Whole Blood 70 - 180 nmol/L 195 (H)   (H): Data is abnormally high    Protein intake: ~80 grams/day     Fluid intake: 64 oz water, decaf tea    Multivitamin/mineral intake: 1 Fusion capsule with Fe      Calcium intake:  2 Fusion chews     Other: none    Exercise: yes walking 30 min/day     Nutrition Assessment: 1 year 6 mos post-op visit. Tracking ~1200 kcal  B: ratio yogurt   S: grapes OR celery + PB  L: chx salad + celery + babybell   D: lean beef/chx + veg  S: sf pudding OR klondike KWESI ice cream OR nothing     Amount able to eat per sitting: 3.5-4 oz of protein and 1/4 cup of side     Following 30/30/30 rule: Eating over 30 minutes. Waits 30 minutes between eating and drinking.     Food Intolerances/issues: none    Client Concerns: none    Goals:   - Continue diet and exercise   Pt goal: maintain weight     Plan: f/u per provider    CORBIN MODI, MS, RD, LD  
1      Review of Systems - History obtained from the patient  General ROS: negative  Psychological ROS: negative  Ophthalmic ROS: negative  Neurological ROS: negative  ENT ROS: negative  Allergy and Immunology ROS: negative  Hematological and Lymphatic ROS: negative  Endocrine ROS: negative  Breast ROS: negative  Respiratory ROS: negative  Cardiovascular ROS: negative  Gastrointestinal ROS:negative  Genito-Urinary ROS: negative  Musculoskeletal ROS: negative   Skin ROS: negative    Physical Exam     Constitutional: Patient is oriented to person, place, and time. Patient appears well-developed and well-nourished. Patient is active and cooperative.  Non-toxic appearance. No distress.   HENT:   Head: Normocephalic and atraumatic. Head is without abrasion and without laceration. Hair is normal.   Right Ear: External ear normal. No lacerations. No drainage, swelling .   Left Ear: External ear normal. No lacerations. No drainage, swelling.   Nose/Mouth: no abrasions nor lesions.  Eyes: Conjunctivae, EOM and lids are normal. Right eye exhibits no discharge. No foreign body present in the right eye. Left eye exhibits no discharge. No foreign body present in the left eye. No scleral icterus.   Neck:No JVD present.   Pulmonary/Chest: Effort normal. No accessory muscle usage or stridor. No apnea. No respiratory distress.   Cardiovascular: Normal rate and no JVD.   Abdominal: Normal appearance. Patient exhibits no distension.    Musculoskeletal: Normal range of motion. Patient exhibits no edema.   Neurological: Patient is alert and oriented to person, place, and time.  Skin: Skin is warm and dry. No abrasion and no rash noted. Patient is not diaphoretic. No cyanosis or erythema.   Psychiatric: Patient has a normal mood and affect. Speech is normal and behavior is normal.       A/P:    Bita was seen today for bariatrics post op follow up.    Diagnoses and all orders for this visit:    Body mass index (BMI) 24.0-24.9,

## 2024-06-16 RX ORDER — LEVOTHYROXINE SODIUM 0.1 MG/1
TABLET ORAL
Qty: 90 TABLET | Refills: 1 | Status: SHIPPED | OUTPATIENT
Start: 2024-06-16

## 2024-07-11 RX ORDER — SPIRONOLACTONE 25 MG/1
TABLET ORAL
Qty: 90 TABLET | Refills: 0 | Status: SHIPPED | OUTPATIENT
Start: 2024-07-11

## 2024-07-11 NOTE — TELEPHONE ENCOUNTER
Refill Request     CONFIRM preferred pharmacy with the patient.    If Mail Order Rx - Pend for 90 day refill.      Last Seen: Last Seen Department: 4/19/2024  Last Seen by PCP: 4/19/2024    Last Written: 4/12/24 90 with no refills     If no future appointment scheduled:  Review the last OV with PCP and review information for follow-up visit,  Route STAFF MESSAGE with patient name to the  Pool for scheduling with the following information:            -  Timing of next visit           -  Visit type ie Physical, OV, etc           -  Diagnoses/Reason ie. COPD, HTN - Do not use MEDICATION, Follow-up or CHECK UP - Give reason for visit      Next Appointment:   Future Appointments   Date Time Provider Department Center   5/8/2025  9:00 AM Charly Magana MD [facility]       Message sent to  to schedule appt with patient?  YES Return in 1 year (on 4/19/2025) for Annual physical       Requested Prescriptions     Pending Prescriptions Disp Refills    spironolactone (ALDACTONE) 25 MG tablet [Pharmacy Med Name: SPIRONOLACT  TAB 25MG] 90 tablet 0     Sig: TAKE 1 TABLET DAILY

## 2024-10-04 DIAGNOSIS — E78.2 MIXED HYPERLIPIDEMIA: Primary | ICD-10-CM

## 2024-10-05 NOTE — TELEPHONE ENCOUNTER
Refill Request     CONFIRM preferred pharmacy with the patient.    If Mail Order Rx - Pend for 90 day refill.      Last Seen: Last Seen Department: 4/19/2024  Last Seen by PCP: 4/19/2024    Last Written: 5/6/2024    If no future appointment scheduled:  Review the last OV with PCP and review information for follow-up visit,  Route STAFF MESSAGE with patient name to the  Pool for scheduling with the following information:            -  Timing of next visit           -  Visit type ie Physical, OV, etc           -  Diagnoses/Reason ie. COPD, HTN - Do not use MEDICATION, Follow-up or CHECK UP - Give reason for visit      Next Appointment:   Future Appointments   Date Time Provider Department Center   10/16/2024 10:15 AM Amando Schaffer MD [facility]   5/8/2025  9:00 AM Charly Magana MD [facility]       Message sent to  to schedule appt with patient?  NO      Requested Prescriptions     Pending Prescriptions Disp Refills    atorvastatin (LIPITOR) 40 MG tablet [Pharmacy Med Name: ATORVASTATIN TAB 40MG] 90 tablet 1     Sig: TAKE 1 TABLET DAILY

## 2024-10-07 RX ORDER — ATORVASTATIN CALCIUM 40 MG/1
40 TABLET, FILM COATED ORAL DAILY
Qty: 90 TABLET | Refills: 1 | Status: SHIPPED | OUTPATIENT
Start: 2024-10-07

## 2024-10-09 RX ORDER — SPIRONOLACTONE 25 MG/1
TABLET ORAL
Qty: 90 TABLET | Refills: 0 | Status: SHIPPED | OUTPATIENT
Start: 2024-10-09

## 2024-10-09 NOTE — TELEPHONE ENCOUNTER
Refill Request     CONFIRM preferred pharmacy with the patient.    If Mail Order Rx - Pend for 90 day refill.      Last Seen: Last Seen Department: 4/19/2024  Last Seen by PCP: Visit date not found    Last Written: 7/11/24 90 with no refills     If no future appointment scheduled:  Review the last OV with PCP and review information for follow-up visit,  Route STAFF MESSAGE with patient name to the  Pool for scheduling with the following information:            -  Timing of next visit           -  Visit type ie Physical, OV, etc           -  Diagnoses/Reason ie. COPD, HTN - Do not use MEDICATION, Follow-up or CHECK UP - Give reason for visit      Next Appointment:   Future Appointments   Date Time Provider Department Center   10/16/2024 10:15 AM Amando Schaffer MD [facility]   5/8/2025  9:00 AM Charly Magana MD [facility]       Message sent to  to schedule appt with patient?  YES Return in 1 year (on 4/19/2025) for Annual physical.       Requested Prescriptions     Pending Prescriptions Disp Refills    spironolactone (ALDACTONE) 25 MG tablet [Pharmacy Med Name: SPIRONOLACT  TAB 25MG] 90 tablet 0     Sig: TAKE 1 TABLET DAILY

## 2024-10-16 ENCOUNTER — OFFICE VISIT (OUTPATIENT)
Dept: SURGERY | Age: 49
End: 2024-10-16
Payer: COMMERCIAL

## 2024-10-16 VITALS
BODY MASS INDEX: 25.03 KG/M2 | RESPIRATION RATE: 16 BRPM | HEIGHT: 64 IN | DIASTOLIC BLOOD PRESSURE: 84 MMHG | HEART RATE: 68 BPM | WEIGHT: 146.6 LBS | SYSTOLIC BLOOD PRESSURE: 130 MMHG

## 2024-10-16 DIAGNOSIS — N64.81 BREAST PTOSIS: ICD-10-CM

## 2024-10-16 DIAGNOSIS — M79.3 PANNICULITIS: Primary | ICD-10-CM

## 2024-10-16 PROCEDURE — 99214 OFFICE O/P EST MOD 30 MIN: CPT | Performed by: SURGERY

## 2024-10-16 RX ORDER — HEPARIN SODIUM 5000 [USP'U]/ML
5000 INJECTION, SOLUTION INTRAVENOUS; SUBCUTANEOUS
OUTPATIENT
Start: 2024-10-16 | End: 2024-10-16

## 2024-10-16 RX ORDER — SODIUM CHLORIDE, SODIUM LACTATE, POTASSIUM CHLORIDE, CALCIUM CHLORIDE 600; 310; 30; 20 MG/100ML; MG/100ML; MG/100ML; MG/100ML
INJECTION, SOLUTION INTRAVENOUS CONTINUOUS
OUTPATIENT
Start: 2024-10-16

## 2024-10-16 RX ORDER — SODIUM CHLORIDE 0.9 % (FLUSH) 0.9 %
5-40 SYRINGE (ML) INJECTION EVERY 12 HOURS SCHEDULED
OUTPATIENT
Start: 2024-10-16

## 2024-10-16 RX ORDER — SODIUM CHLORIDE 9 MG/ML
INJECTION, SOLUTION INTRAVENOUS PRN
OUTPATIENT
Start: 2024-10-16

## 2024-10-16 RX ORDER — SODIUM CHLORIDE 0.9 % (FLUSH) 0.9 %
5-40 SYRINGE (ML) INJECTION PRN
OUTPATIENT
Start: 2024-10-16

## 2024-10-16 NOTE — PROGRESS NOTES
[facility]    Consultation requested by: Charly Magana MD    CC: Body contouring    HPI: 48 y.o. female with a PMHx as delineated below who presents in consultation for body contouring. She underwent a sleeve gastrectomy with Dr. Magana losing a total of 100 lbs.  Since that time, she notes that she is having issues with excess skin under her pannus.  She has had issues with recurrent infections both beneath her pannus as well as at her umbilicus.  She was on prescription topical antimicrobial therapy from her PCP without any improvement. She notes that there is a significant odor as well as near constant discomfort in these areas.  Given these factors, she was referred for evaluation. She additionally notes significant descent of her breasts as well as irritation and skin breakdown that has been refractory to her antimicrobial therapy.    Since her last evaluation, she notes that there has not been any changes in her medical history.    Bariatric surgery: Yes / date: 12/22 (Type: sleeve gastrectomy)    Max weight (lbs): 253  Lowest weight (lbs): 139  Current (lbs): 146 (142)  Weight change in the past 3 months: No  Max BMI: 43  Current BMI: 25.1 (24.4)    History of DVT/PE: No  Previous body contouring procedures: No  Smoking: No    Last Mammogram: 9/23 - new one PENDING    Current bra size: 38DDD  Desired bra size: Smaller  Pregnancies/miscarriages: 0 / 0  Breast feeding: no future plans    Past Medical History:   Diagnosis Date    Chronic GERD 07/28/2022    Chronic GERD 07/28/2022    Disease of both eyes characterized by increased eye pressure 04/23/2018    Essential hypertension 10/30/2011    Glaucoma     Have a Cupped Optic Nerve.  Ask me about.    Hiatal hernia     Hypertension     Localized osteoarthrosis, lower leg 02/17/2016    Mixed hyperlipidemia 08/18/2022    Morbid obesity with BMI of 40.0-44.9, adult 04/18/2019    Obesity     Prediabetes 04/18/2019    Severe obesity (BMI

## 2024-10-17 ENCOUNTER — TELEPHONE (OUTPATIENT)
Dept: SURGERY | Age: 49
End: 2024-10-17

## 2024-10-17 NOTE — TELEPHONE ENCOUNTER
The patient was in the office to see Dr. Schaffer yesterday.    PLAN: Discussed options with Bita.  Would benefit from panniculectomy with mastopexy for functional improvement. Additionally, would benefit from auto-augmentation for improvement in volume without implant as well as FdL abdominoplasty. She would like to move forward with the abdominal aspect initially and then discuss her breasts in the future in a staged manner. Will submit to insurance and provide cosmetic pricing and schedule.     I received a surgery letter.    I lmom for the patient at the home number listed. I requested a call back to discuss the surgical plan listed and needed medical records.     I will leave this phone note open.

## 2024-10-22 NOTE — TELEPHONE ENCOUNTER
I spoke with the patient at the home number listed to discuss surgery dates in January. The patient will decide how she would like to move forward and call me back.      I will leave this phone note open.

## 2024-10-24 ENCOUNTER — PREP FOR PROCEDURE (OUTPATIENT)
Dept: SURGERY | Age: 49
End: 2024-10-24

## 2024-10-24 DIAGNOSIS — M79.3 PANNICULITIS: ICD-10-CM

## 2024-10-24 NOTE — TELEPHONE ENCOUNTER
The patient called to move forward with scheduling surgery. The patient is now scheduled for surgery with  on 1-.     The patient is aware of H&P.    The patient is scheduled for her post op appointment 2-6-2025.     I will submit the case request to [facility] today.     I will email the surgery information, instructions and cosmetic pricing to the patient today at [email or web address]     I will close this phone note.

## 2024-12-06 RX ORDER — LEVOTHYROXINE SODIUM 100 UG/1
TABLET ORAL
Qty: 90 TABLET | Refills: 1 | Status: SHIPPED | OUTPATIENT
Start: 2024-12-06

## 2024-12-06 NOTE — TELEPHONE ENCOUNTER
Refill Request     CONFIRM preferred pharmacy with the patient.    If Mail Order Rx - Pend for 90 day refill.      Last Seen: Last Seen Department: 4/19/2024  Last Seen by PCP: 4/19/2024    Last Written: 6/16/24    If no future appointment scheduled:  Review the last OV with PCP and review information for follow-up visit,  Route STAFF MESSAGE with patient name to the  Pool for scheduling with the following information:            -  Timing of next visit           -  Visit type ie Physical, OV, etc           -  Diagnoses/Reason ie. COPD, HTN - Do not use MEDICATION, Follow-up or CHECK UP - Give reason for visit      Next Appointment:   Future Appointments   Date Time Provider Department Center   1/14/2025  3:30 PM Kayla Perez MD [facility]   1/23/2025  9:00 AM Prachi Torres APRN - CNP [facility]   2/6/2025  1:00 PM Prachi Torres APRN - CNP [facility]   5/8/2025  9:00 AM Charly Magana MD [facility]       Message sent to  to schedule appt with patient?  NO      Requested Prescriptions     Pending Prescriptions Disp Refills    levothyroxine (SYNTHROID) 100 MCG tablet [Pharmacy Med Name: SYNTHROID TAB 100MCG] 90 tablet 1     Sig: TAKE 1 TABLET DAILY

## 2024-12-31 RX ORDER — SPIRONOLACTONE 25 MG/1
TABLET ORAL
Qty: 90 TABLET | Refills: 1 | Status: SHIPPED | OUTPATIENT
Start: 2024-12-31

## 2024-12-31 NOTE — TELEPHONE ENCOUNTER
Refill Request     CONFIRM preferred pharmacy with the patient.    If Mail Order Rx - Pend for 90 day refill.      Last Seen: Last Seen Department: 4/19/2024  Last Seen by PCP: Visit date not found    Last Written: 10/9/24 90 with no refills     If no future appointment scheduled:  Review the last OV with PCP and review information for follow-up visit,  Route STAFF MESSAGE with patient name to the  Pool for scheduling with the following information:            -  Timing of next visit           -  Visit type ie Physical, OV, etc           -  Diagnoses/Reason ie. COPD, HTN - Do not use MEDICATION, Follow-up or CHECK UP - Give reason for visit      Next Appointment:   Future Appointments   Date Time Provider Department Center   1/14/2025  3:30 PM Kayla Perez MD [facility]   1/23/2025  9:00 AM Prachi Torres APRN - CNP [facility]   2/6/2025  1:00 PM Prachi Torres APRN - CNP [facility]   5/8/2025  9:00 AM Charly Magana MD [facility]       Message sent to  to schedule appt with patient?  NO      Requested Prescriptions     Pending Prescriptions Disp Refills    spironolactone (ALDACTONE) 25 MG tablet [Pharmacy Med Name: SPIRONOLACT  TAB 25MG] 90 tablet 0     Sig: TAKE 1 TABLET DAILY

## 2025-01-14 ENCOUNTER — OFFICE VISIT (OUTPATIENT)
Dept: FAMILY MEDICINE CLINIC | Age: 50
End: 2025-01-14
Payer: COMMERCIAL

## 2025-01-14 VITALS
BODY MASS INDEX: 25.47 KG/M2 | DIASTOLIC BLOOD PRESSURE: 78 MMHG | TEMPERATURE: 97 F | OXYGEN SATURATION: 98 % | WEIGHT: 149.2 LBS | HEIGHT: 64 IN | SYSTOLIC BLOOD PRESSURE: 110 MMHG | HEART RATE: 66 BPM

## 2025-01-14 DIAGNOSIS — Z01.818 PREOPERATIVE EXAMINATION: Primary | ICD-10-CM

## 2025-01-14 DIAGNOSIS — L98.7 EXCESS SKIN OF ABDOMEN: ICD-10-CM

## 2025-01-14 DIAGNOSIS — E03.9 HYPOTHYROIDISM, UNSPECIFIED TYPE: ICD-10-CM

## 2025-01-14 DIAGNOSIS — R61 NIGHT SWEATS: ICD-10-CM

## 2025-01-14 DIAGNOSIS — Z01.818 PREOPERATIVE EXAMINATION: ICD-10-CM

## 2025-01-14 PROCEDURE — 99214 OFFICE O/P EST MOD 30 MIN: CPT | Performed by: FAMILY MEDICINE

## 2025-01-14 PROCEDURE — 93000 ELECTROCARDIOGRAM COMPLETE: CPT | Performed by: FAMILY MEDICINE

## 2025-01-14 RX ORDER — SPIRONOLACTONE 25 MG/1
TABLET ORAL
Qty: 90 TABLET | Refills: 3 | Status: SHIPPED | OUTPATIENT
Start: 2025-01-14

## 2025-01-14 RX ORDER — GARLIC 180 MG
1 TABLET, DELAYED RELEASE (ENTERIC COATED) ORAL DAILY
COMMUNITY
Start: 2025-01-14

## 2025-01-14 SDOH — ECONOMIC STABILITY: FOOD INSECURITY: WITHIN THE PAST 12 MONTHS, THE FOOD YOU BOUGHT JUST DIDN'T LAST AND YOU DIDN'T HAVE MONEY TO GET MORE.: NEVER TRUE

## 2025-01-14 SDOH — ECONOMIC STABILITY: FOOD INSECURITY: WITHIN THE PAST 12 MONTHS, YOU WORRIED THAT YOUR FOOD WOULD RUN OUT BEFORE YOU GOT MONEY TO BUY MORE.: NEVER TRUE

## 2025-01-14 NOTE — PROGRESS NOTES
No    Patient Active Problem List   Diagnosis    Hypothyroid    Right knee pain    Patellofemoral arthrosis    Localized osteoarthrosis, lower leg    Family history of malignant neoplasm of digestive organ    Disease of both eyes characterized by increased eye pressure    Mixed hyperlipidemia    Metabolic syndrome    Hiatal hernia    Ventral hernia without obstruction or gangrene    S/P laparoscopic sleeve gastrectomy    Body mass index (BMI) 24.0-24.9, adult    Panniculitis       Past Medical History:   Diagnosis Date    Chronic GERD 07/28/2022    Chronic GERD 07/28/2022    Disease of both eyes characterized by increased eye pressure 04/23/2018    Essential hypertension 10/30/2011    Glaucoma     Have a Cupped Optic Nerve.  Ask me about.    Hiatal hernia     Hypertension     Localized osteoarthrosis, lower leg 02/17/2016    Mixed hyperlipidemia 08/18/2022    Morbid obesity with BMI of 40.0-44.9, adult 04/18/2019    Obesity     Prediabetes 04/18/2019    Severe obesity (BMI 35.0-39.9) with comorbidity 12/05/2022    Thyroid disease      Past Surgical History:   Procedure Laterality Date    BREAST BIOPSY      COLONOSCOPY  2006    Ask me about.    HIATAL HERNIA REPAIR N/A 12/5/2022    LAPAROSCOPIC HIATAL HERNIA REPAIR performed by Charly Magana MD at [facility] OR    SLEEVE GASTRECTOMY N/A 12/5/2022    LAPAROSCOPIC SLEEVE GASTRECTOMY performed by Charly Magana MD at [facility] OR    UMBILICAL HERNIA REPAIR N/A 12/5/2022    LAPAROSCOPIC UMBILICAL HERNIA REPAIR performed by Charly Magana MD at [facility] OR    UPPER GASTROINTESTINAL ENDOSCOPY N/A 9/23/2022    EGD BIOPSY performed by Charly Magana MD at [facility] ASC ENDOSCOPY     Family History   Problem Relation Age of Onset    Arthritis Mother         Rheumatoid    Cancer Mother         Colon Cancer    Heart Disease Father         Quad Bypass    High Blood Pressure Father     Brain Cancer Sister 60    Heart Disease Maternal Grandmother 47    Heart Disease Maternal Grandfather

## 2025-01-14 NOTE — H&P (VIEW-ONLY)
Preoperative Consultation      Bita Brady  YOB: 1975    Date of Service:  1/14/2025    Vitals:    01/14/25 1534   BP: 110/78   Pulse: 66   Temp: 97 °F (36.1 °C)   SpO2: 98%   Weight: 67.7 kg (149 lb 3.2 oz)   Height: 1.626 m (5' 4.02\")      Wt Readings from Last 2 Encounters:   01/14/25 67.7 kg (149 lb 3.2 oz)   10/16/24 66.5 kg (146 lb 9.6 oz)     BP Readings from Last 3 Encounters:   01/14/25 110/78   10/16/24 130/84   05/09/24 110/66        Chief Complaint   Patient presents with    Pre-op Exam     1.30.25  Dr Schaffer   Skin removal and tummy tuck   @ [facility]          Allergies   Allergen Reactions    Nickel Other (See Comments)     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    Codeine Nausea Only, Rash and Nausea And Vomiting     Outpatient Medications Marked as Taking for the 1/14/25 encounter (Office Visit) with Kayla Perez MD   Medication Sig Dispense Refill    spironolactone (ALDACTONE) 25 MG tablet TAKE 1 TABLET DAILY 90 tablet 1    levothyroxine (SYNTHROID) 100 MCG tablet TAKE 1 TABLET DAILY 90 tablet 1    atorvastatin (LIPITOR) 40 MG tablet TAKE 1 TABLET DAILY 90 tablet 1    ASPIRIN LOW DOSE 81 MG EC tablet TAKE 1 TABLET BY MOUTH EVERY DAY *MAKE APPOINTMENT FOR MORE REFILLS* (Patient taking differently: Hold days before surgery 1.30.25) 30 tablet 0    nystatin (MYCOSTATIN) 587889 UNIT/GM powder Apply topically 4 times daily. 30 g 1       This patient presents to the office today for a preoperative consultation at the request of surgeon, Dr. Schaffer, who plans on performing Panniculectomy, Ciklj-re-Xkb abdominoplasty on January 30 at [facility].  The current problem began 2 years ago, and symptoms have been worsening with time.  Conservative therapy: N/A.    Planned anesthesia: General   Known anesthesia problems: None   Bleeding risk: No recent or remote history of abnormal bleeding  Personal or FH of DVT/PE: No    Patient objection to receiving blood products:

## 2025-01-15 LAB
ALBUMIN SERPL-MCNC: 4.6 G/DL (ref 3.4–5)
ALBUMIN/GLOB SERPL: 2 {RATIO} (ref 1.1–2.2)
ALP SERPL-CCNC: 67 U/L (ref 40–129)
ALT SERPL-CCNC: 20 U/L (ref 10–40)
ANION GAP SERPL CALCULATED.3IONS-SCNC: 11 MMOL/L (ref 3–16)
APTT BLD: 31.1 SEC (ref 22.1–36.4)
AST SERPL-CCNC: 18 U/L (ref 15–37)
BILIRUB SERPL-MCNC: 0.5 MG/DL (ref 0–1)
BUN SERPL-MCNC: 12 MG/DL (ref 7–20)
CALCIUM SERPL-MCNC: 10.6 MG/DL (ref 8.3–10.6)
CHLORIDE SERPL-SCNC: 102 MMOL/L (ref 99–110)
CO2 SERPL-SCNC: 27 MMOL/L (ref 21–32)
CREAT SERPL-MCNC: 0.7 MG/DL (ref 0.6–1.1)
DEPRECATED RDW RBC AUTO: 13.1 % (ref 12.4–15.4)
GFR SERPLBLD CREATININE-BSD FMLA CKD-EPI: >90 ML/MIN/{1.73_M2}
GLUCOSE SERPL-MCNC: 79 MG/DL (ref 70–99)
HCT VFR BLD AUTO: 41.8 % (ref 36–48)
HGB BLD-MCNC: 14 G/DL (ref 12–16)
INR PPP: 1 (ref 0.85–1.15)
MCH RBC QN AUTO: 26.7 PG (ref 26–34)
MCHC RBC AUTO-ENTMCNC: 33.5 G/DL (ref 31–36)
MCV RBC AUTO: 79.8 FL (ref 80–100)
PLATELET # BLD AUTO: 233 K/UL (ref 135–450)
PMV BLD AUTO: 8.9 FL (ref 5–10.5)
POTASSIUM SERPL-SCNC: 4.5 MMOL/L (ref 3.5–5.1)
PROT SERPL-MCNC: 6.9 G/DL (ref 6.4–8.2)
PROTHROMBIN TIME: 13.4 SEC (ref 11.9–14.9)
RBC # BLD AUTO: 5.23 M/UL (ref 4–5.2)
SODIUM SERPL-SCNC: 140 MMOL/L (ref 136–145)
WBC # BLD AUTO: 6.2 K/UL (ref 4–11)

## 2025-01-23 ENCOUNTER — OFFICE VISIT (OUTPATIENT)
Dept: SURGERY | Age: 50
End: 2025-01-23
Payer: COMMERCIAL

## 2025-01-23 VITALS
HEART RATE: 66 BPM | SYSTOLIC BLOOD PRESSURE: 110 MMHG | WEIGHT: 149 LBS | BODY MASS INDEX: 25.44 KG/M2 | DIASTOLIC BLOOD PRESSURE: 78 MMHG | HEIGHT: 64 IN

## 2025-01-23 DIAGNOSIS — M79.3 PANNICULITIS: Primary | ICD-10-CM

## 2025-01-23 PROCEDURE — 99214 OFFICE O/P EST MOD 30 MIN: CPT

## 2025-01-23 NOTE — PROGRESS NOTES
PERRLA/EOMI; hearing appears within normal limits  NECK: Supple with trachea in midline, no masses  BREAST: Right larger than Left   R  Ptosis ndgndrndanddndend:nd nd2nd Palpable masses: No     Nipple retraction: No     Palpable axillary lymphadenopathy: No     SN-N: 33.6 cm     N-IMF: 12 cm     Breast width: 15.8 cm         L  Ptosis ndgndrndanddndend:nd nd2nd Palpable masses: No     Nipple retraction: No     Palpable axillary lymphadenopathy: No     SN-N: 33.2 cm     N-IMF: 10.4 cm     Breast width: 17 cm  ABD: soft/NT/ND  Grade 1 pannus with significant excess in both the horizontal and vertical dimensions  No palpable hernia  EXT: No lymphedema noted  NEURO: No focal deficits, no obvious CN deficits    IMP: 49 y.o. female with panniculitis and breast ptosis  PLAN:  We discussed in depth pre-op and post op instructions and surgical plan. We discussed she is to stop all supplements and NSAIDS 7 days prior to surgery. We discussed the need to optimize protein to  grams per day following surgery. We discussed restrictions are to be in place 6 weeks which includes no lifting over 5 pounds, no pulling or pushing. No sweeping or running vacuum. We discussed ANISH drain care and potential for wound vac placement. We discussed she would have the abdominal drains in place for 2 weeks.  .  We discussed the need for someone to be with patient following surgery. Patient agrees with surgical plan and wishes to proceed with scheduled surgery.      Prachi Torres, APRN-CNP  [facility]  [phone]  01/23/25

## 2025-01-24 RX ORDER — M-VIT,TX,IRON,MINS/CALC/FOLIC 27MG-0.4MG
1 TABLET ORAL DAILY
COMMUNITY

## 2025-01-24 NOTE — PROGRESS NOTES
eating/ drinking as you will have very specific instructions to follow.  If you did not receive these, call your surgeon's office immediately.     5. MEDICATIONS:  Take the following medications with a SMALL sip of water: Levothyroxine   Use your usual dose of inhalers the morning of surgery. BRING your rescue inhaler with you to hospital.   Anesthesia does NOT want you to take insulin the morning of surgery. They will control your blood sugar while you are at the hospital. Please contact your ordering physician for instructions regarding your insulin the night before your procedure. If you have an insulin pump, please keep it set on basal rate.   Bariatric patient's call your surgeon if on diabetic medications as some may need to be stopped 1 week prior to surgery    6. Do not swallow additional water when brushing teeth. No gum, candy, mints, or ice chips. Refrain from smoking or at least decrease the amount on day of surgery.    7. Morning of surgery:   Take a shower with an antibacterial soap (i.e., Safeguard or Dial) OR your physician may have instructed you to use Hibiclens.  Dress in loose, comfortable clothing appropriate for redressing after your procedure.   Do not wear jewelry (including body piercings), make-up (especially NO eye make-up), fingernail polish (NO toenail polish if foot/leg surgery), lotion, powders, or metal hairclips.   Do not shave or wax for 72 hours prior to procedure near your operative site. Shaving with a razor can irritate your skin and make it easier to develop an infection. On the day of your procedure, any hair that needs to be removed near the surgical site will be 'clipped' by a healthcare worker using a special clipper designed to avoid skin irritation.    8. Dentures, glasses, or contacts will need to be removed before your procedure. Bring cases for your glasses, contacts, dentures, or hearing aids to protect them while you are in surgery.      9. If you use a CPAP, please  bring it with you on the day of your procedure.    10. If you use oxygen at home, please bring your oxygen tank with you to hospital..     11. We recommend that valuable personal belongings such as cash, cell phones, e-tablets, or jewelry, be left at home during your stay. The hospital will not be responsible for valuables that are not secured in the hospital safe. However, if your insurance requires a co-pay, you may want to bring a method of payment, i.e., Check or credit card, if you wish to pay your co-pay the day of surgery.      12. If you are to stay overnight, you may bring a bag with personal items. Please have any large items you may need brought in by your family after your arrival to your hospital room.    13. If you have a Living Will or Durable Power of , please bring a copy on the day of your procedure.     How we keep you safe and work to prevent surgical site infections:   1. Health care workers should always check your ID bracelet to verify your name and birth date. You will be asked many times to state your name, date of birth, and allergies.    2. Health care workers should always clean their hands with soap or alcohol gel before providing care to you. It is okay to ask anyone if they cleaned their hands before they touch you.    3. You will be actively involved in verifying the type of procedure you are having and ensuring the correct surgical site. This will be confirmed multiple times prior to your procedure. Do NOT nahomi your surgery site UNLESS instructed to by your surgeon.     4. When you are in the operating room, your surgical site will be cleansed with a special soap, and in most cases, you will be given an antibiotic before the surgery begins.      What to expect AFTER your procedure?  1. Immediately following your procedure, your will be taken to the PACU for the first phase of your recovery.  Your nurse will help you recover from any potential side effects of anesthesia, such

## 2025-01-24 NOTE — PROGRESS NOTES
1/24/2025 0936 AM:      PREP FOR PROCEDURE INSTRUCTIONS/TS:         PRE-OP ORDERS/CONSENT TO BE ENTERED BY SURGEON'S OFFICE/TS.    H&P IN EPIC NOTES 1/14/2025, LABS IN EPIC 1/14/2025, AND EKG TRACING IN EPIC 1/14/2025/TS    1/24/2025 1003 AM:  LMOR W/INSTRUCTIONS AND EMAILED TO PT AND LMOR REQUESTING PT TO RETURN CALL TO [facility] PAT/TS    Notified DR JOSHI'S OFFICE UNABLE TO REACH PT/TS    1/24/2025 1439 pm:    LMOR REQUESTING PT TO RETURN CALL TO [facility] PAT/TS

## 2025-01-24 NOTE — PROGRESS NOTES
1/24/2025 1003 AM:    PRE-OP INSTRUCTIONS FOR SURGICAL PATIENTS          Our Pre-admission Testing Nurses tried and were unable to reach you today.  Please read the attached instructions AND listen to your voicemail. PLEASE CALL [phone].    Follow all instructions provided to you from your surgeon's office, including your ARRIVAL TIME.   Arrange for someone to drive you home and be with you for the first 24 hours after discharge.     NOTE: at this time ONLY 2 ADULTS may accompany you. NO CHILDREN UNDER THE AGE OF 16.    One person encouraged to stay at hospital entire time if outpatient surgery  Enter the MAIN entrance located on [address] and report to the surgical desk on the LEFT side of the lobby. Please park in the parking garage or there is free  Parking available after 7am for your use.    Bring your insurance card & photo ID with you to register.  Bring your medication list with you with dose and frequency listed (including over the counter medications)  Contact your ordering physician/surgeon for medication instructions as soon as possible, especially if taking blood thinners, aspirin, heart, or diabetic medication.   STOP VITAMINS/SUPPLEMENTS/NON-STEROIDAL ANTI-INFLAMMATORY MEDICATIONS 7 DAYS PRIOR TO PROCEDURE.   Bariatric surgical patients need to call your surgeon if on diabetic medications (as some may need to be stopped 1-week preop)  A Pre-Surgical History and Physical MUST be completed WITHIN 30 DAYS OR LESS prior to your procedure by your Physician or an Urgent Care.  DO NOT EAT ANYTHING 8 hours prior to arrival for surgery.  You may have sips of WATER ONLY (up to 8 ounces) 4 hours prior to your arrival for surgery. Then nothing further 4 hours prior to arriving at hospital.     CONTACT DOCTOR AND FOLLOW INSTRUCTIONS FOR WHEN TO STOP/HOLD ALL BLOOD THINNERS PRIOR TO PROCEDURE-INCLUDES ASPIRIN, ELIQUIS, XARELTO, PLAVIX, COUMADIN.  IF TAKING SYNTHROID IN AM, TAKE DAY OF PROCEDURE WITH

## 2025-01-27 ENCOUNTER — TELEPHONE (OUTPATIENT)
Dept: SURGERY | Age: 50
End: 2025-01-27

## 2025-01-27 NOTE — TELEPHONE ENCOUNTER
I returned the patients call mentioned below. I lmom and advised that I sent a message to Mormon SHARON and I assume all is good.    I will close this phone note.

## 2025-01-27 NOTE — TELEPHONE ENCOUNTER
Patient called requesting to speak to Ivett.     Patient states she spoke to Marina at [facility] and she spoke with Ana Luisa at [facility] on Friday with Pre-Admission.         Patient can be reached at [phone].

## 2025-01-28 NOTE — PROGRESS NOTES
1/28/25 @ 1028 Message sent to Dr. Schaffer's , Ivett, requesting orders/ consent be placed /ag   QUETIAPINE 100 MG Oral Tab 90 tablet 0 3/11/2023

## 2025-01-29 ENCOUNTER — PREP FOR PROCEDURE (OUTPATIENT)
Dept: SURGERY | Age: 50
End: 2025-01-29

## 2025-01-29 RX ORDER — SODIUM CHLORIDE 9 MG/ML
INJECTION, SOLUTION INTRAVENOUS PRN
Status: CANCELLED | OUTPATIENT
Start: 2025-01-30

## 2025-01-29 RX ORDER — SODIUM CHLORIDE 0.9 % (FLUSH) 0.9 %
5-40 SYRINGE (ML) INJECTION PRN
Status: CANCELLED | OUTPATIENT
Start: 2025-01-30

## 2025-01-29 RX ORDER — SODIUM CHLORIDE, SODIUM LACTATE, POTASSIUM CHLORIDE, CALCIUM CHLORIDE 600; 310; 30; 20 MG/100ML; MG/100ML; MG/100ML; MG/100ML
INJECTION, SOLUTION INTRAVENOUS CONTINUOUS
Status: CANCELLED | OUTPATIENT
Start: 2025-01-29

## 2025-01-29 RX ORDER — SODIUM CHLORIDE 0.9 % (FLUSH) 0.9 %
5-40 SYRINGE (ML) INJECTION EVERY 12 HOURS SCHEDULED
Status: CANCELLED | OUTPATIENT
Start: 2025-01-30

## 2025-01-29 RX ORDER — HEPARIN SODIUM 5000 [USP'U]/ML
5000 INJECTION, SOLUTION INTRAVENOUS; SUBCUTANEOUS
Status: CANCELLED | OUTPATIENT
Start: 2025-01-30 | End: 2025-01-30

## 2025-01-30 ENCOUNTER — ANESTHESIA (OUTPATIENT)
Dept: OPERATING ROOM | Age: 50
End: 2025-01-30
Payer: COMMERCIAL

## 2025-01-30 ENCOUNTER — HOSPITAL ENCOUNTER (OUTPATIENT)
Age: 50
Setting detail: OBSERVATION
Discharge: HOME | End: 2025-01-31
Attending: SURGERY | Admitting: SURGERY
Payer: COMMERCIAL

## 2025-01-30 ENCOUNTER — ANESTHESIA EVENT (OUTPATIENT)
Dept: OPERATING ROOM | Age: 50
End: 2025-01-30
Payer: COMMERCIAL

## 2025-01-30 DIAGNOSIS — M79.3 PANNICULITIS: ICD-10-CM

## 2025-01-30 DIAGNOSIS — G89.18 ACUTE POST-OPERATIVE PAIN: Primary | ICD-10-CM

## 2025-01-30 PROCEDURE — 6360000002 HC RX W HCPCS: Performed by: STUDENT IN AN ORGANIZED HEALTH CARE EDUCATION/TRAINING PROGRAM

## 2025-01-30 PROCEDURE — 96372 THER/PROPH/DIAG INJ SC/IM: CPT

## 2025-01-30 PROCEDURE — 6370000000 HC RX 637 (ALT 250 FOR IP): Performed by: STUDENT IN AN ORGANIZED HEALTH CARE EDUCATION/TRAINING PROGRAM

## 2025-01-30 PROCEDURE — 2580000003 HC RX 258: Performed by: SURGERY

## 2025-01-30 PROCEDURE — 3700000000 HC ANESTHESIA ATTENDED CARE: Performed by: SURGERY

## 2025-01-30 PROCEDURE — 49591 RPR AA HRN 1ST < 3 CM RDC: CPT | Performed by: SURGERY

## 2025-01-30 PROCEDURE — 6360000002 HC RX W HCPCS

## 2025-01-30 PROCEDURE — 6360000002 HC RX W HCPCS: Performed by: SURGERY

## 2025-01-30 PROCEDURE — 2709999900 HC NON-CHARGEABLE SUPPLY: Performed by: SURGERY

## 2025-01-30 PROCEDURE — 2580000003 HC RX 258: Performed by: ANESTHESIOLOGY

## 2025-01-30 PROCEDURE — MISCABD ABDOMINOPLAST-RESECTION, PLCTN, REPSTN, LIPPO: Performed by: SURGERY

## 2025-01-30 PROCEDURE — 6370000000 HC RX 637 (ALT 250 FOR IP): Performed by: ANESTHESIOLOGY

## 2025-01-30 PROCEDURE — 7100000001 HC PACU RECOVERY - ADDTL 15 MIN: Performed by: SURGERY

## 2025-01-30 PROCEDURE — 2500000003 HC RX 250 WO HCPCS: Performed by: STUDENT IN AN ORGANIZED HEALTH CARE EDUCATION/TRAINING PROGRAM

## 2025-01-30 PROCEDURE — 3600000014 HC SURGERY LEVEL 4 ADDTL 15MIN: Performed by: SURGERY

## 2025-01-30 PROCEDURE — 3600000004 HC SURGERY LEVEL 4 BASE: Performed by: SURGERY

## 2025-01-30 PROCEDURE — 7100000000 HC PACU RECOVERY - FIRST 15 MIN: Performed by: SURGERY

## 2025-01-30 PROCEDURE — 88305 TISSUE EXAM BY PATHOLOGIST: CPT

## 2025-01-30 PROCEDURE — 15830 EXC EXCESSIVE SKIN ABDOMEN: CPT | Performed by: SURGERY

## 2025-01-30 PROCEDURE — 2720000010 HC SURG SUPPLY STERILE: Performed by: SURGERY

## 2025-01-30 PROCEDURE — G0378 HOSPITAL OBSERVATION PER HR: HCPCS

## 2025-01-30 PROCEDURE — 6360000002 HC RX W HCPCS: Performed by: ANESTHESIOLOGY

## 2025-01-30 PROCEDURE — 3700000001 HC ADD 15 MINUTES (ANESTHESIA): Performed by: SURGERY

## 2025-01-30 PROCEDURE — 2500000003 HC RX 250 WO HCPCS

## 2025-01-30 PROCEDURE — 2500000003 HC RX 250 WO HCPCS: Performed by: SURGERY

## 2025-01-30 RX ORDER — GLYCOPYRROLATE 0.2 MG/ML
INJECTION INTRAMUSCULAR; INTRAVENOUS
Status: DISCONTINUED | OUTPATIENT
Start: 2025-01-30 | End: 2025-01-30 | Stop reason: SDUPTHER

## 2025-01-30 RX ORDER — PROPOFOL 10 MG/ML
INJECTION, EMULSION INTRAVENOUS
Status: DISCONTINUED | OUTPATIENT
Start: 2025-01-30 | End: 2025-01-30 | Stop reason: SDUPTHER

## 2025-01-30 RX ORDER — DIAZEPAM 5 MG/1
5 TABLET ORAL EVERY 6 HOURS PRN
Status: DISCONTINUED | OUTPATIENT
Start: 2025-01-30 | End: 2025-01-31

## 2025-01-30 RX ORDER — SODIUM CHLORIDE, SODIUM LACTATE, POTASSIUM CHLORIDE, CALCIUM CHLORIDE 600; 310; 30; 20 MG/100ML; MG/100ML; MG/100ML; MG/100ML
INJECTION, SOLUTION INTRAVENOUS CONTINUOUS
Status: DISCONTINUED | OUTPATIENT
Start: 2025-01-30 | End: 2025-01-30 | Stop reason: HOSPADM

## 2025-01-30 RX ORDER — SODIUM CHLORIDE 0.9 % (FLUSH) 0.9 %
10 SYRINGE (ML) INJECTION EVERY 12 HOURS SCHEDULED
Status: DISCONTINUED | OUTPATIENT
Start: 2025-01-30 | End: 2025-01-31 | Stop reason: HOSPADM

## 2025-01-30 RX ORDER — SODIUM CHLORIDE 0.9 % (FLUSH) 0.9 %
5-40 SYRINGE (ML) INJECTION EVERY 12 HOURS SCHEDULED
Status: DISCONTINUED | OUTPATIENT
Start: 2025-01-30 | End: 2025-01-30 | Stop reason: HOSPADM

## 2025-01-30 RX ORDER — OXYCODONE HYDROCHLORIDE 5 MG/1
5 TABLET ORAL
Status: COMPLETED | OUTPATIENT
Start: 2025-01-30 | End: 2025-01-30

## 2025-01-30 RX ORDER — MIDAZOLAM HYDROCHLORIDE 1 MG/ML
INJECTION, SOLUTION INTRAMUSCULAR; INTRAVENOUS
Status: DISCONTINUED | OUTPATIENT
Start: 2025-01-30 | End: 2025-01-30 | Stop reason: SDUPTHER

## 2025-01-30 RX ORDER — HEPARIN SODIUM 5000 [USP'U]/ML
5000 INJECTION, SOLUTION INTRAVENOUS; SUBCUTANEOUS EVERY 8 HOURS SCHEDULED
Status: DISCONTINUED | OUTPATIENT
Start: 2025-01-31 | End: 2025-01-31

## 2025-01-30 RX ORDER — NALOXONE HYDROCHLORIDE 0.4 MG/ML
INJECTION, SOLUTION INTRAMUSCULAR; INTRAVENOUS; SUBCUTANEOUS PRN
Status: DISCONTINUED | OUTPATIENT
Start: 2025-01-30 | End: 2025-01-30 | Stop reason: HOSPADM

## 2025-01-30 RX ORDER — ROCURONIUM BROMIDE 10 MG/ML
INJECTION, SOLUTION INTRAVENOUS
Status: DISCONTINUED | OUTPATIENT
Start: 2025-01-30 | End: 2025-01-30 | Stop reason: SDUPTHER

## 2025-01-30 RX ORDER — ONDANSETRON 2 MG/ML
4 INJECTION INTRAMUSCULAR; INTRAVENOUS EVERY 6 HOURS PRN
Status: DISCONTINUED | OUTPATIENT
Start: 2025-01-30 | End: 2025-01-31 | Stop reason: HOSPADM

## 2025-01-30 RX ORDER — SODIUM CHLORIDE, SODIUM LACTATE, POTASSIUM CHLORIDE, CALCIUM CHLORIDE 600; 310; 30; 20 MG/100ML; MG/100ML; MG/100ML; MG/100ML
INJECTION, SOLUTION INTRAVENOUS CONTINUOUS
Status: DISCONTINUED | OUTPATIENT
Start: 2025-01-30 | End: 2025-01-30 | Stop reason: SDUPTHER

## 2025-01-30 RX ORDER — LIDOCAINE HYDROCHLORIDE 20 MG/ML
INJECTION, SOLUTION INTRAVENOUS
Status: DISCONTINUED | OUTPATIENT
Start: 2025-01-30 | End: 2025-01-30 | Stop reason: SDUPTHER

## 2025-01-30 RX ORDER — LABETALOL HYDROCHLORIDE 5 MG/ML
10 INJECTION, SOLUTION INTRAVENOUS
Status: DISCONTINUED | OUTPATIENT
Start: 2025-01-30 | End: 2025-01-30 | Stop reason: HOSPADM

## 2025-01-30 RX ORDER — MEPERIDINE HYDROCHLORIDE 25 MG/ML
12.5 INJECTION INTRAMUSCULAR; INTRAVENOUS; SUBCUTANEOUS EVERY 5 MIN PRN
Status: DISCONTINUED | OUTPATIENT
Start: 2025-01-30 | End: 2025-01-30 | Stop reason: HOSPADM

## 2025-01-30 RX ORDER — FENTANYL CITRATE 50 UG/ML
INJECTION, SOLUTION INTRAMUSCULAR; INTRAVENOUS
Status: DISCONTINUED | OUTPATIENT
Start: 2025-01-30 | End: 2025-01-30 | Stop reason: SDUPTHER

## 2025-01-30 RX ORDER — SODIUM CHLORIDE 0.9 % (FLUSH) 0.9 %
5-40 SYRINGE (ML) INJECTION PRN
Status: DISCONTINUED | OUTPATIENT
Start: 2025-01-30 | End: 2025-01-30 | Stop reason: HOSPADM

## 2025-01-30 RX ORDER — FAMOTIDINE 10 MG/ML
INJECTION, SOLUTION INTRAVENOUS
Status: DISCONTINUED | OUTPATIENT
Start: 2025-01-30 | End: 2025-01-30 | Stop reason: SDUPTHER

## 2025-01-30 RX ORDER — PROCHLORPERAZINE EDISYLATE 5 MG/ML
5 INJECTION INTRAMUSCULAR; INTRAVENOUS
Status: DISCONTINUED | OUTPATIENT
Start: 2025-01-30 | End: 2025-01-30 | Stop reason: HOSPADM

## 2025-01-30 RX ORDER — HYDROMORPHONE HYDROCHLORIDE 1 MG/ML
0.25 INJECTION, SOLUTION INTRAMUSCULAR; INTRAVENOUS; SUBCUTANEOUS EVERY 4 HOURS PRN
Status: DISCONTINUED | OUTPATIENT
Start: 2025-01-30 | End: 2025-01-31

## 2025-01-30 RX ORDER — GINSENG 100 MG
CAPSULE ORAL PRN
Status: DISCONTINUED | OUTPATIENT
Start: 2025-01-30 | End: 2025-01-30

## 2025-01-30 RX ORDER — HYDRALAZINE HYDROCHLORIDE 20 MG/ML
10 INJECTION INTRAMUSCULAR; INTRAVENOUS
Status: DISCONTINUED | OUTPATIENT
Start: 2025-01-30 | End: 2025-01-30 | Stop reason: HOSPADM

## 2025-01-30 RX ORDER — HYDROMORPHONE HYDROCHLORIDE 1 MG/ML
0.5 INJECTION, SOLUTION INTRAMUSCULAR; INTRAVENOUS; SUBCUTANEOUS EVERY 5 MIN PRN
Status: COMPLETED | OUTPATIENT
Start: 2025-01-30 | End: 2025-01-30

## 2025-01-30 RX ORDER — HEPARIN SODIUM 5000 [USP'U]/ML
5000 INJECTION, SOLUTION INTRAVENOUS; SUBCUTANEOUS
Status: COMPLETED | OUTPATIENT
Start: 2025-01-30 | End: 2025-01-30

## 2025-01-30 RX ORDER — ONDANSETRON 2 MG/ML
INJECTION INTRAMUSCULAR; INTRAVENOUS
Status: DISCONTINUED | OUTPATIENT
Start: 2025-01-30 | End: 2025-01-30 | Stop reason: SDUPTHER

## 2025-01-30 RX ORDER — SODIUM CHLORIDE 9 MG/ML
INJECTION, SOLUTION INTRAVENOUS PRN
Status: DISCONTINUED | OUTPATIENT
Start: 2025-01-30 | End: 2025-01-31 | Stop reason: HOSPADM

## 2025-01-30 RX ORDER — DIPHENHYDRAMINE HYDROCHLORIDE 50 MG/ML
INJECTION INTRAMUSCULAR; INTRAVENOUS
Status: DISCONTINUED | OUTPATIENT
Start: 2025-01-30 | End: 2025-01-30 | Stop reason: SDUPTHER

## 2025-01-30 RX ORDER — SODIUM CHLORIDE 9 MG/ML
INJECTION, SOLUTION INTRAVENOUS PRN
Status: DISCONTINUED | OUTPATIENT
Start: 2025-01-30 | End: 2025-01-30 | Stop reason: HOSPADM

## 2025-01-30 RX ORDER — GINSENG 100 MG
CAPSULE ORAL PRN
Status: DISCONTINUED | OUTPATIENT
Start: 2025-01-31 | End: 2025-01-31 | Stop reason: HOSPADM

## 2025-01-30 RX ORDER — ONDANSETRON 4 MG/1
4 TABLET, ORALLY DISINTEGRATING ORAL EVERY 8 HOURS PRN
Status: DISCONTINUED | OUTPATIENT
Start: 2025-01-30 | End: 2025-01-31 | Stop reason: HOSPADM

## 2025-01-30 RX ORDER — OXYCODONE HYDROCHLORIDE 5 MG/1
5 TABLET ORAL EVERY 4 HOURS PRN
Status: DISCONTINUED | OUTPATIENT
Start: 2025-01-30 | End: 2025-01-31

## 2025-01-30 RX ORDER — ONDANSETRON 2 MG/ML
4 INJECTION INTRAMUSCULAR; INTRAVENOUS
Status: DISCONTINUED | OUTPATIENT
Start: 2025-01-30 | End: 2025-01-30 | Stop reason: HOSPADM

## 2025-01-30 RX ORDER — FIBRINOGEN HUMAN, HUMAN THROMBIN 90; 500 [IU]/ML; [IU]/ML
SOLUTION TOPICAL PRN
Status: DISCONTINUED | OUTPATIENT
Start: 2025-01-30 | End: 2025-01-30 | Stop reason: HOSPADM

## 2025-01-30 RX ORDER — SODIUM CHLORIDE 0.9 % (FLUSH) 0.9 %
10 SYRINGE (ML) INJECTION PRN
Status: DISCONTINUED | OUTPATIENT
Start: 2025-01-30 | End: 2025-01-31 | Stop reason: HOSPADM

## 2025-01-30 RX ADMIN — MEPERIDINE HYDROCHLORIDE 12.5 MG: 25 INJECTION INTRAMUSCULAR; INTRAVENOUS; SUBCUTANEOUS at 10:50

## 2025-01-30 RX ADMIN — HYDROMORPHONE HYDROCHLORIDE 0.5 MG: 1 INJECTION, SOLUTION INTRAMUSCULAR; INTRAVENOUS; SUBCUTANEOUS at 11:52

## 2025-01-30 RX ADMIN — PROPOFOL 120 MG: 10 INJECTION, EMULSION INTRAVENOUS at 07:53

## 2025-01-30 RX ADMIN — TRANEXAMIC ACID 1000 MG: 1 INJECTION, SOLUTION INTRAVENOUS at 08:01

## 2025-01-30 RX ADMIN — FENTANYL CITRATE 50 MCG: 50 INJECTION, SOLUTION INTRAMUSCULAR; INTRAVENOUS at 10:17

## 2025-01-30 RX ADMIN — SODIUM CHLORIDE, POTASSIUM CHLORIDE, SODIUM LACTATE AND CALCIUM CHLORIDE: 600; 310; 30; 20 INJECTION, SOLUTION INTRAVENOUS at 08:15

## 2025-01-30 RX ADMIN — DIPHENHYDRAMINE HYDROCHLORIDE 12.5 MG: 50 INJECTION INTRAMUSCULAR; INTRAVENOUS at 08:51

## 2025-01-30 RX ADMIN — WATER 2000 MG: 1 INJECTION INTRAMUSCULAR; INTRAVENOUS; SUBCUTANEOUS at 08:01

## 2025-01-30 RX ADMIN — ROCURONIUM BROMIDE 20 MG: 10 INJECTION, SOLUTION INTRAVENOUS at 09:01

## 2025-01-30 RX ADMIN — HYDROMORPHONE HYDROCHLORIDE 0.5 MG: 1 INJECTION, SOLUTION INTRAMUSCULAR; INTRAVENOUS; SUBCUTANEOUS at 14:42

## 2025-01-30 RX ADMIN — FENTANYL CITRATE 50 MCG: 50 INJECTION, SOLUTION INTRAMUSCULAR; INTRAVENOUS at 09:02

## 2025-01-30 RX ADMIN — WATER 2000 MG: 1 INJECTION INTRAMUSCULAR; INTRAVENOUS; SUBCUTANEOUS at 17:11

## 2025-01-30 RX ADMIN — LIDOCAINE HYDROCHLORIDE 60 MG: 20 INJECTION, SOLUTION INTRAVENOUS at 07:51

## 2025-01-30 RX ADMIN — PROPOFOL 50 MG: 10 INJECTION, EMULSION INTRAVENOUS at 09:56

## 2025-01-30 RX ADMIN — ONDANSETRON 4 MG: 2 INJECTION, SOLUTION INTRAMUSCULAR; INTRAVENOUS at 17:24

## 2025-01-30 RX ADMIN — PROPOFOL 50 MG: 10 INJECTION, EMULSION INTRAVENOUS at 08:26

## 2025-01-30 RX ADMIN — ROCURONIUM BROMIDE 10 MG: 10 INJECTION, SOLUTION INTRAVENOUS at 09:33

## 2025-01-30 RX ADMIN — ROCURONIUM BROMIDE 20 MG: 10 INJECTION, SOLUTION INTRAVENOUS at 08:17

## 2025-01-30 RX ADMIN — FENTANYL CITRATE 50 MCG: 50 INJECTION, SOLUTION INTRAMUSCULAR; INTRAVENOUS at 07:57

## 2025-01-30 RX ADMIN — OXYCODONE HYDROCHLORIDE 5 MG: 5 TABLET ORAL at 21:29

## 2025-01-30 RX ADMIN — FAMOTIDINE 20 MG: 10 INJECTION, SOLUTION INTRAVENOUS at 08:25

## 2025-01-30 RX ADMIN — ONDANSETRON 4 MG: 2 INJECTION INTRAMUSCULAR; INTRAVENOUS at 09:45

## 2025-01-30 RX ADMIN — GLYCOPYRROLATE 0.2 MG: 0.2 INJECTION INTRAMUSCULAR; INTRAVENOUS at 09:06

## 2025-01-30 RX ADMIN — HEPARIN SODIUM 5000 UNITS: 5000 INJECTION INTRAVENOUS; SUBCUTANEOUS at 07:18

## 2025-01-30 RX ADMIN — OXYCODONE HYDROCHLORIDE 5 MG: 5 TABLET ORAL at 17:11

## 2025-01-30 RX ADMIN — ROCURONIUM BROMIDE 50 MG: 10 INJECTION, SOLUTION INTRAVENOUS at 07:54

## 2025-01-30 RX ADMIN — SODIUM CHLORIDE, POTASSIUM CHLORIDE, SODIUM LACTATE AND CALCIUM CHLORIDE: 600; 310; 30; 20 INJECTION, SOLUTION INTRAVENOUS at 07:35

## 2025-01-30 RX ADMIN — SUGAMMADEX 200 MG: 100 INJECTION, SOLUTION INTRAVENOUS at 10:13

## 2025-01-30 RX ADMIN — HYDROMORPHONE HYDROCHLORIDE 0.5 MG: 1 INJECTION, SOLUTION INTRAMUSCULAR; INTRAVENOUS; SUBCUTANEOUS at 10:44

## 2025-01-30 RX ADMIN — PROPOFOL 30 MG: 10 INJECTION, EMULSION INTRAVENOUS at 07:54

## 2025-01-30 RX ADMIN — MEPERIDINE HYDROCHLORIDE 12.5 MG: 25 INJECTION INTRAMUSCULAR; INTRAVENOUS; SUBCUTANEOUS at 11:13

## 2025-01-30 RX ADMIN — WATER 2000 MG: 1 INJECTION INTRAMUSCULAR; INTRAVENOUS; SUBCUTANEOUS at 23:38

## 2025-01-30 RX ADMIN — DIAZEPAM 5 MG: 5 TABLET ORAL at 22:44

## 2025-01-30 RX ADMIN — FENTANYL CITRATE 50 MCG: 50 INJECTION, SOLUTION INTRAMUSCULAR; INTRAVENOUS at 07:52

## 2025-01-30 RX ADMIN — ROCURONIUM BROMIDE 10 MG: 10 INJECTION, SOLUTION INTRAVENOUS at 08:51

## 2025-01-30 RX ADMIN — HYDROMORPHONE HYDROCHLORIDE 0.5 MG: 1 INJECTION, SOLUTION INTRAMUSCULAR; INTRAVENOUS; SUBCUTANEOUS at 11:01

## 2025-01-30 RX ADMIN — MIDAZOLAM HYDROCHLORIDE 2 MG: 1 INJECTION, SOLUTION INTRAMUSCULAR; INTRAVENOUS at 07:42

## 2025-01-30 RX ADMIN — OXYCODONE HYDROCHLORIDE 5 MG: 5 TABLET ORAL at 12:58

## 2025-01-30 ASSESSMENT — PAIN SCALES - GENERAL
PAINLEVEL_OUTOF10: 7
PAINLEVEL_OUTOF10: 6
PAINLEVEL_OUTOF10: 5
PAINLEVEL_OUTOF10: 4
PAINLEVEL_OUTOF10: 5
PAINLEVEL_OUTOF10: 9
PAINLEVEL_OUTOF10: 4
PAINLEVEL_OUTOF10: 9
PAINLEVEL_OUTOF10: 8
PAINLEVEL_OUTOF10: 7
PAINLEVEL_OUTOF10: 8
PAINLEVEL_OUTOF10: 7
PAINLEVEL_OUTOF10: 5

## 2025-01-30 ASSESSMENT — PAIN DESCRIPTION - DESCRIPTORS
DESCRIPTORS: BURNING;ACHING
DESCRIPTORS: BURNING;CRAMPING
DESCRIPTORS: ACHING;CRAMPING
DESCRIPTORS: BURNING
DESCRIPTORS: BURNING

## 2025-01-30 ASSESSMENT — PAIN DESCRIPTION - FREQUENCY
FREQUENCY: CONTINUOUS
FREQUENCY: CONTINUOUS

## 2025-01-30 ASSESSMENT — PAIN DESCRIPTION - ORIENTATION
ORIENTATION: MID
ORIENTATION: MID;LEFT;RIGHT
ORIENTATION: RIGHT;LEFT;MID

## 2025-01-30 ASSESSMENT — PAIN DESCRIPTION - PAIN TYPE
TYPE: SURGICAL PAIN
TYPE: ACUTE PAIN;SURGICAL PAIN

## 2025-01-30 ASSESSMENT — PAIN - FUNCTIONAL ASSESSMENT
PAIN_FUNCTIONAL_ASSESSMENT: PREVENTS OR INTERFERES SOME ACTIVE ACTIVITIES AND ADLS
PAIN_FUNCTIONAL_ASSESSMENT: 0-10
PAIN_FUNCTIONAL_ASSESSMENT: ACTIVITIES ARE NOT PREVENTED
PAIN_FUNCTIONAL_ASSESSMENT: ACTIVITIES ARE NOT PREVENTED
PAIN_FUNCTIONAL_ASSESSMENT: 0-10

## 2025-01-30 ASSESSMENT — PAIN DESCRIPTION - LOCATION
LOCATION: ABDOMEN

## 2025-01-30 ASSESSMENT — PAIN DESCRIPTION - ONSET: ONSET: ON-GOING

## 2025-01-30 NOTE — BRIEF OP NOTE
Brief Postoperative Note      Patient: Bita Brady  YOB: 1975  MRN: [id number]    Date of Procedure: 1/30/2025    Pre-Op Diagnosis Codes:      * Panniculitis [M79.3]    Post-Op Diagnosis: Same, + umbilical hernia repair of recurrent umbilical hernia        Procedure(s):  Panniculectomy, Buypy-ry-Wre abdominoplasty  ., umbilical hernia repair of recurrent umbilical hernia     Surgeon(s):  Amando Schaffer MD Kundu, Neilendu, MD    Assistant:  Surgical Assistant: Dee Chowdhury  Resident: Jade Perez MD    Anesthesia: General    Estimated Blood Loss (mL): less than 200     Complications: None    Specimens:   ID Type Source Tests Collected by Time Destination   A : PANNUS Tissue Tissue SURGICAL PATHOLOGY Amando Schaffer MD 1/30/2025 1004        Implants:  * No implants in log *      Drains:   Closed/Suction Drain Right Abdomen Bulb (Active)   Site Description Clean, dry & intact 01/30/25 0930   Dressing Status New dressing applied 01/30/25 0930   Drain Status To bulb suction 01/30/25 0930       Closed/Suction Drain Left Abdomen Bulb (Active)   Site Description Clean, dry & intact 01/30/25 0930   Dressing Status New dressing applied 01/30/25 0930   Drain Status To bulb suction 01/30/25 0930       Findings:  Infection Present At Time Of Surgery (PATOS) (choose all levels that have infection present):  No infection present  Other Findings: Umbilical hernia repair of recurrent umbilical hernia performed. 2 ANISH drains at bilateral lateral abdomen. [place] out at Post op check. Patient to be admitted    Electronically signed by Jade Perez MD on 1/30/2025 at 10:39 AM

## 2025-01-30 NOTE — PROGRESS NOTES
PACU Transfer Note    Vitals:    01/30/25 1432   BP: 134/82   Pulse: 70   Resp: 14   Temp: 98.3 °F (36.8 °C)   SpO2: 97%       In: 2485 [P.O.:390; I.V.:2035]  Out: 438 [Urine:213; Drains:75]    Pain assessment:  present - adequately treated  Pain Level: 7    Report given to Receiving unit RN bedside in pacu    1/30/2025 2:45 PM

## 2025-01-30 NOTE — PROGRESS NOTES
Patient brought to room 5319 from PACU. Patient is A&O x 4. VSS. Patient oriented to the room all safety measures in place. Patient given IS and SCDs at this time. Admission orders released and patient 4 eyes completed. Admission documentation completed. No other needs are noted at this time.    [x] Bed alarm on and cord plugged into wall  [x] Bed in lowest position  [x] Call light and bedside table within reach  [x] Patient educated on all safety measures  []Oxygen connected to wall (if applicable)     Nurse 1 Esignature: Electronically signed by Vern Layton RN on 1/30/25 at 4:19 PM EST  Nurse 2 Esignature: Electronically signed by Naida Brooks RN on 1/30/25 at 9:40 PM EST

## 2025-01-30 NOTE — ANESTHESIA POSTPROCEDURE EVALUATION
Department of Anesthesiology  Postprocedure Note    Patient: Bita Brady  MRN: [id number]  YOB: 1975  Date of evaluation: 1/30/2025    Procedure Summary       Date: 01/30/25 Room / Location: [place] [address] / [facility]    Anesthesia Start: 0746 Anesthesia Stop: 1034    Procedures:       Panniculectomy, Pebmy-ou-Hzx abdominoplasty (Abdomen)      . (Abdomen) Diagnosis:       Panniculitis      (Panniculitis [M79.3])    Surgeons: Amando Schaffer MD Responsible Provider: Jose Grewal MD    Anesthesia Type: General ASA Status: 2            Anesthesia Type: General    Anel Phase I: Anel Score: 10    Anel Phase II:      Anesthesia Post Evaluation    Patient location during evaluation: PACU  Patient participation: complete - patient participated  Level of consciousness: awake and alert  Airway patency: patent  Nausea & Vomiting: no nausea and no vomiting  Cardiovascular status: hemodynamically stable  Respiratory status: acceptable  Hydration status: euvolemic  Multimodal analgesia pain management approach  Pain management: satisfactory to patient    No notable events documented.

## 2025-01-30 NOTE — PROGRESS NOTES
Patient arrived calm CO of being cold and medium to sever pain report from CRNA of procedure, Bed is to stay a current position per Dr Schaffer until further orders

## 2025-01-30 NOTE — OP NOTE
[facility]    NAME: Bita Brady   MRN: [id number]  DATE: 1/30/2025     AGE: 49 y.o.     PREOPERATIVE DIAGNOSIS:  Panniculitis, rectus diastasis     POSTOPERATIVE DIAGNOSIS:  Same, recurrent umbilical hernia     PROCEDURES:  1.  Panniculectomy  2.  Repair of recurrent umbilical hernia (<3 cm)  3.  Odpta-zt-Fnb abdominoplasty with rectus plication     SURGEON:  Amando Schaffer MD     ASSISTANTS: Jade ePrez (PGY1), Dee Chowdhury (SA)     ANESTHESIA:  General (1.5 hours cosmetic)     ESTIMATED BLOOD LOSS:  50  mL.     DRAINS:  2 15F round     SPECIMENS:  Panniculus (5.6 lbs).     OPERATIVE INDICATIONS:  This is a 49 y.o. 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  The patient has had problems with panniculitis and presented for discussion regarding options. Additionally, she desired improvement in the upper contour of her abdomen with a ttrqp-ik-mqb abdominoplasty. We discussed the differences between a panniculectomy and abdominoplasty and had a thorough discussion regarding the risks, benefits, alternatives, outcomes, personnel involved with the patient and family.  After all questions were answered, the patient elected to proceed with the above procedures.       OPERATIVE PROCEDURE:  The patient was brought to the operating room and placed in the supine position on the operating room table.  She then underwent general endotracheal anesthesia without difficulty and was then prepped and draped in the usual sterile manner.  A timeout was performed confirming the patient and the procedure to be performed.  The operation commenced by incising along the inferior marking with a 10 blade.  The subcutaneous tissue was dissected to the anterior abdominal wall with electrocautery.  Hemostasis was obtained with a combination of clips, ties, and electrocautery.  Dissection then continued in a cranial direction until the superior incision. Using a

## 2025-01-30 NOTE — PROGRESS NOTES
Department of Surgery  Post Op Note    Subjective: Pt states only has pain with ambulation, she is OOB in chair (beachchair position), tolerating ice/fluids well, no flatus or BM, states she is not hungry but will order small dinner    Procedure: Panniculectomy, Xilnc-nr-Bgw abdominoplasty  ., umbilical hernia repair of recurrent umbilical hernia     Objective:  Anesthesia type: General      I/O    Intra op    Post op     Fluids          EBL  minimal       Urine         Exam: VITALS:  /82   Pulse 70   Temp 98.3 °F (36.8 °C) (Oral)   Resp 14   Ht 1.626 m (5' 4\")   Wt 65.8 kg (145 lb)   LMP 09/23/2021 (Approximate) Comment: over a year  SpO2 97%   BMI 24.89 kg/m²   24HR INTAKE/OUTPUT:    Intake/Output Summary (Last 24 hours) at 1/30/2025 1532  Last data filed at 1/30/2025 1500  Gross per 24 hour   Intake 2485 ml   Output 538 ml   Net 1947 ml     DRAIN/TUBE OUTPUT:  Closed/Suction Drain Right Abdomen Bulb-Output (ml): 20 ml  Closed/Suction Drain Left Abdomen Bulb-Output (ml): 55 ml  Post-op vital signs:  Stable   Constitutional: oriented x 3, No distress.   Cardiovascular: well-perfused  Pulmonary: Effort normal and breath sounds normal. No respiratory distress.   Musculoskeletal: no edema, moves all extremities, beach chair position  : mendoza in place with good UO  Abdomen: 2 ANISH drains to bulb suction present, incisions c/d/I, abdominal binder with foam in place, umbilical site with xeroform and Tegaderm   Neurological: She is alert and oriented to person, place, and time. No deficits noted  Skin: Skin is warm and dry.   Psychiatric: normal affect, appropriate behavior    Assessment and Plan  This patient presents to [facility] on 01/30/2025 for a scheduled panniculectomy, whtxc-xb-cpm abdominoplasty and umbilical hernia repair of a recurrent umbilical hernia  The current problem began 2 years ago, and symptoms have been worsening with time. POD #0    Pain management: MMPC  Diet - Regular, 4 carbs  :  Urine

## 2025-01-30 NOTE — PROGRESS NOTES
4 Eyes Skin Assessment     NAME:  Bita Brady  YOB: 1975  MEDICAL RECORD NUMBER:  [id number]    The patient is being assessed for  Post-Op Surgical    I agree that at least one RN has performed a thorough Head to Toe Skin Assessment on the patient. ALL assessment sites listed below have been assessed.      Areas assessed by both nurses:    Head, Face, Ears, Shoulders, Back, Chest, Arms, Elbows, Hands, Sacrum. Buttock, Coccyx, Ischium, Legs. Feet and Heels, and Under Medical Devices         Does the Patient have a Wound? No noted wound(s)       Anam Prevention initiated by RN: No  Wound Care Orders initiated by RN: No    Pressure Injury (Stage 3,4, Unstageable, DTI, NWPT, and Complex wounds) if present, place Wound referral order by RN under : No    New Ostomies, if present place, Ostomy referral order under : No     Nurse 1 eSignature: Electronically signed by Vern Layton RN on 1/30/25 at 4:18 PM EST    **SHARE this note so that the co-signing nurse can place an eSignature**    Nurse 2 eSignature: Electronically signed by Naida Brooks RN on 1/30/25 at 9:40 PM EST

## 2025-01-30 NOTE — PROGRESS NOTES
Patient meets criteria to transfer to floor per Anel Score. There are no available beds at this time.  Placed in CLINICAL DISCHARGE.      Vitals changed to every hour and Head - to - toe assessments changed to every 2 hours.  Family updated.    In: 1940 [P.O.:30; I.V.:1850]  Out: 303     Vitals:    01/30/25 1230   BP: (!) 140/94   Pulse: 76   Resp: 12   Temp: 98.5 °F (36.9 °C)   SpO2: 95%

## 2025-01-30 NOTE — ANESTHESIA PRE PROCEDURE
Department of Anesthesiology  Preprocedure Note       Name:  Bita Brady   Age:  49 y.o.  :  1975                                          MRN:  [id number]         Date:  2025      Surgeon: Surgeon(s):  Amando Schaffer MD    Procedure: Procedure(s):  Panniculectomy, Yntwl-np-Abd abdominoplasty  .    Medications prior to admission:   Prior to Admission medications    Medication Sig Start Date End Date Taking? Authorizing Provider   Multiple Vitamins-Minerals (THERAPEUTIC MULTIVITAMIN-MINERALS) tablet Take 1 tablet by mouth daily    Provider, MD Harsha   spironolactone (ALDACTONE) 25 MG tablet Take 1 Tablet Daily 25   Kayla Perez MD   Black Cohosh 40 MG CAPS Take 1 tablet by mouth daily  Patient not taking: Reported on 2025   Kayla Perez MD   levothyroxine (SYNTHROID) 100 MCG tablet TAKE 1 TABLET DAILY 24   Kayla Perez MD   atorvastatin (LIPITOR) 40 MG tablet TAKE 1 TABLET DAILY 10/7/24   Kelly Bridges APRN - CNP   ASPIRIN LOW DOSE 81 MG EC tablet TAKE 1 TABLET BY MOUTH EVERY DAY *MAKE APPOINTMENT FOR MORE REFILLS*  Patient not taking: Reported on 23   Oliverio Cantrell MD   nystatin (MYCOSTATIN) 312006 UNIT/GM powder Apply topically 4 times daily.  Patient taking differently: as needed Apply topically 4 times daily. 23   Luz Marina Garay APRN       Current medications:    Current Facility-Administered Medications   Medication Dose Route Frequency Provider Last Rate Last Admin   • lactated ringers infusion   IntraVENous Continuous Lev Rowell MD       • sodium chloride flush 0.9 % injection 5-40 mL  5-40 mL IntraVENous 2 times per day Amando Schaffer MD       • sodium chloride flush 0.9 % injection 5-40 mL  5-40 mL IntraVENous PRN Amando Schaffer MD       • 0.9 % sodium chloride infusion   IntraVENous PRN Amando Schaffer MD       • ceFAZolin (ANCEF) 2,000 mg in sterile water 20 mL IV syringe  2,000 mg IntraVENous On Call to OR Karime

## 2025-01-31 VITALS
RESPIRATION RATE: 14 BRPM | HEART RATE: 74 BPM | WEIGHT: 145 LBS | BODY MASS INDEX: 24.75 KG/M2 | OXYGEN SATURATION: 97 % | DIASTOLIC BLOOD PRESSURE: 84 MMHG | SYSTOLIC BLOOD PRESSURE: 135 MMHG | TEMPERATURE: 98.6 F | HEIGHT: 64 IN

## 2025-01-31 LAB
ALBUMIN SERPL-MCNC: 3.9 G/DL (ref 3.4–5)
ANION GAP SERPL CALCULATED.3IONS-SCNC: 11 MMOL/L (ref 3–16)
BASOPHILS # BLD: 0 K/UL (ref 0–0.2)
BASOPHILS NFR BLD: 0.4 %
BUN SERPL-MCNC: 10 MG/DL (ref 7–20)
CALCIUM SERPL-MCNC: 9.4 MG/DL (ref 8.3–10.6)
CHLORIDE SERPL-SCNC: 102 MMOL/L (ref 99–110)
CO2 SERPL-SCNC: 25 MMOL/L (ref 21–32)
CREAT SERPL-MCNC: 0.7 MG/DL (ref 0.6–1.1)
DEPRECATED RDW RBC AUTO: 12.9 % (ref 12.4–15.4)
EOSINOPHIL # BLD: 0.2 K/UL (ref 0–0.6)
EOSINOPHIL NFR BLD: 1.9 %
GFR SERPLBLD CREATININE-BSD FMLA CKD-EPI: >90 ML/MIN/{1.73_M2}
GLUCOSE SERPL-MCNC: 103 MG/DL (ref 70–99)
HCT VFR BLD AUTO: 39.2 % (ref 36–48)
HGB BLD-MCNC: 13 G/DL (ref 12–16)
LYMPHOCYTES # BLD: 0.8 K/UL (ref 1–5.1)
LYMPHOCYTES NFR BLD: 9 %
MAGNESIUM SERPL-MCNC: 1.53 MG/DL (ref 1.8–2.4)
MCH RBC QN AUTO: 26.4 PG (ref 26–34)
MCHC RBC AUTO-ENTMCNC: 33.2 G/DL (ref 31–36)
MCV RBC AUTO: 79.4 FL (ref 80–100)
MONOCYTES # BLD: 0.5 K/UL (ref 0–1.3)
MONOCYTES NFR BLD: 5.5 %
NEUTROPHILS # BLD: 7 K/UL (ref 1.7–7.7)
NEUTROPHILS NFR BLD: 83.2 %
PHOSPHATE SERPL-MCNC: 2.6 MG/DL (ref 2.5–4.9)
PLATELET # BLD AUTO: 199 K/UL (ref 135–450)
PMV BLD AUTO: 8.1 FL (ref 5–10.5)
POTASSIUM SERPL-SCNC: 3.9 MMOL/L (ref 3.5–5.1)
RBC # BLD AUTO: 4.94 M/UL (ref 4–5.2)
SODIUM SERPL-SCNC: 138 MMOL/L (ref 136–145)
WBC # BLD AUTO: 8.5 K/UL (ref 4–11)

## 2025-01-31 PROCEDURE — 80069 RENAL FUNCTION PANEL: CPT

## 2025-01-31 PROCEDURE — 6370000000 HC RX 637 (ALT 250 FOR IP): Performed by: STUDENT IN AN ORGANIZED HEALTH CARE EDUCATION/TRAINING PROGRAM

## 2025-01-31 PROCEDURE — 36415 COLL VENOUS BLD VENIPUNCTURE: CPT

## 2025-01-31 PROCEDURE — 83735 ASSAY OF MAGNESIUM: CPT

## 2025-01-31 PROCEDURE — 6360000002 HC RX W HCPCS

## 2025-01-31 PROCEDURE — 6360000002 HC RX W HCPCS: Performed by: STUDENT IN AN ORGANIZED HEALTH CARE EDUCATION/TRAINING PROGRAM

## 2025-01-31 PROCEDURE — 85025 COMPLETE CBC W/AUTO DIFF WBC: CPT

## 2025-01-31 PROCEDURE — 96372 THER/PROPH/DIAG INJ SC/IM: CPT

## 2025-01-31 PROCEDURE — G0378 HOSPITAL OBSERVATION PER HR: HCPCS

## 2025-01-31 PROCEDURE — 2500000003 HC RX 250 WO HCPCS: Performed by: STUDENT IN AN ORGANIZED HEALTH CARE EDUCATION/TRAINING PROGRAM

## 2025-01-31 RX ORDER — HEPARIN SODIUM 5000 [USP'U]/ML
5000 INJECTION, SOLUTION INTRAVENOUS; SUBCUTANEOUS EVERY 12 HOURS SCHEDULED
Status: DISCONTINUED | OUTPATIENT
Start: 2025-01-31 | End: 2025-01-31 | Stop reason: HOSPADM

## 2025-01-31 RX ORDER — DIAZEPAM 5 MG/1
5 TABLET ORAL EVERY 6 HOURS PRN
Status: DISCONTINUED | OUTPATIENT
Start: 2025-01-31 | End: 2025-01-31 | Stop reason: HOSPADM

## 2025-01-31 RX ORDER — OXYCODONE HYDROCHLORIDE 5 MG/1
5 TABLET ORAL EVERY 4 HOURS PRN
Status: DISCONTINUED | OUTPATIENT
Start: 2025-01-31 | End: 2025-01-31 | Stop reason: HOSPADM

## 2025-01-31 RX ORDER — CEPHALEXIN 500 MG/1
500 CAPSULE ORAL 4 TIMES DAILY
Qty: 40 CAPSULE | Refills: 0 | Status: SHIPPED | OUTPATIENT
Start: 2025-01-31 | End: 2025-02-10

## 2025-01-31 RX ORDER — OXYCODONE HYDROCHLORIDE 5 MG/1
5 TABLET ORAL EVERY 6 HOURS PRN
Qty: 20 TABLET | Refills: 0 | Status: SHIPPED | OUTPATIENT
Start: 2025-01-31 | End: 2025-02-05

## 2025-01-31 RX ORDER — OXYCODONE HYDROCHLORIDE 5 MG/1
10 TABLET ORAL EVERY 4 HOURS PRN
Status: DISCONTINUED | OUTPATIENT
Start: 2025-01-31 | End: 2025-01-31 | Stop reason: HOSPADM

## 2025-01-31 RX ORDER — DIAZEPAM 2 MG/1
2 TABLET ORAL EVERY 8 HOURS PRN
Qty: 14 TABLET | Refills: 0 | Status: SHIPPED | OUTPATIENT
Start: 2025-01-31 | End: 2025-02-10

## 2025-01-31 RX ADMIN — WATER 2000 MG: 1 INJECTION INTRAMUSCULAR; INTRAVENOUS; SUBCUTANEOUS at 08:41

## 2025-01-31 RX ADMIN — HEPARIN SODIUM 5000 UNITS: 5000 INJECTION INTRAVENOUS; SUBCUTANEOUS at 08:41

## 2025-01-31 RX ADMIN — SODIUM CHLORIDE, PRESERVATIVE FREE 10 ML: 5 INJECTION INTRAVENOUS at 08:41

## 2025-01-31 RX ADMIN — DIAZEPAM 5 MG: 5 TABLET ORAL at 08:41

## 2025-01-31 RX ADMIN — OXYCODONE HYDROCHLORIDE 5 MG: 5 TABLET ORAL at 03:29

## 2025-01-31 ASSESSMENT — PAIN DESCRIPTION - DESCRIPTORS: DESCRIPTORS: ACHING

## 2025-01-31 ASSESSMENT — PAIN SCALES - GENERAL: PAINLEVEL_OUTOF10: 8

## 2025-01-31 ASSESSMENT — PAIN DESCRIPTION - ORIENTATION: ORIENTATION: MID

## 2025-01-31 ASSESSMENT — PAIN DESCRIPTION - LOCATION: LOCATION: ABDOMEN;BACK

## 2025-01-31 NOTE — PROGRESS NOTES
Patient alert and oriented.    Abd binder with foam in place.  Bilateral toña drains in place with bloody output.  [place] in place with adequate UO.   Pain managed with PRN oxycodone.   Patient walked in the hallway in beach chair position. Tolerated well.     Vitals:    01/31/25 0342   BP: (!) 147/83   Pulse: 76   Resp: 16   Temp: 97.9 °F (36.6 °C)   SpO2: 94%

## 2025-01-31 NOTE — PROGRESS NOTES
Discharge order received. Patient informed of discharge order. Discharge instructions reviewed with patient and . Copy of discharge instructions given to patient. Meds to bed picked up by  patient. Patient and  verbalized understanding, denies needs or questions at this time. IV removed. All patient belongings packed and sent with patient upon discharge.

## 2025-01-31 NOTE — PLAN OF CARE
Problem: Discharge Planning  Goal: Discharge to home or other facility with appropriate resources  Outcome: Progressing  Flowsheets (Taken 1/31/2025 1206)  Discharge to home or other facility with appropriate resources:   Identify barriers to discharge with patient and caregiver   Arrange for needed discharge resources and transportation as appropriate     Problem: Pain  Goal: Verbalizes/displays adequate comfort level or baseline comfort level  Outcome: Progressing  Flowsheets (Taken 1/31/2025 1206)  Verbalizes/displays adequate comfort level or baseline comfort level:   Encourage patient to monitor pain and request assistance   Assess pain using appropriate pain scale   Administer analgesics based on type and severity of pain and evaluate response

## 2025-01-31 NOTE — DISCHARGE INSTRUCTIONS
[facility]    Akira Schaffer MD  [address] [facility] ([address])  [place] [address] [phone]    Post-op Instructions  ___________________________________________    Abdominoplasty , umbilical hernia repair    The following instructions will help you know what to expect in the days following your surgery. Do not, however, hesitate to call if you have questions or concerns.    Activities   No driving, lifting, or strenuous activity. Walking is necessary after you are discharged to prevent blood clots. Walk for 10 to 15 minutes every 2 hours.   Do not sit up directly from the reclining position. When getting out of bed, turn onto your side, slide your legs out of bed, and use your arms to push yourself upright. When sitting, use the spirometer (breathing device) 10 times every 2 hours minimum.   Wear your abdominal binder day and night for 3 weeks. Keep it snug. This reduces bruising and prevents fluid from accumulating under the flap.   During the first few days after surgery, most patients find it more comfortable to sleep in a flexed position. Use pillows to keep your head and shoulders elevated, and place a pillow under your knees. If you are not comfortable sleeping in this position, you may sleep flat. It is more important to get a good night’s rest   You may shower 48 hours after surgery. Be sure to replace your abdominal binder snugly after your shower. Do not tub bathe or use hot water in the shower. Warm water is okay.   Do not exercise or do any activity that raises your heart rate or blood pressure for 2 to 3 weeks. You may resume light activity such as walking and light lifting (up to 5 pounds) within the first few days after surgery. At your follow-up appointment you may discuss resuming any strenuous activity such as biking, swimming, aerobics, or weightlifting (usually three to six weeks). Also, when returning to more exercise, start slowly and gradually work up to

## 2025-01-31 NOTE — PROGRESS NOTES
Plastic Surgery   Daily Progress Note  Patient: Bita Brady      CC: Panniculectomy, Egono-jk-Qbv abdominoplasty, umbilical hernia repair of recurrent umbilical hernia     SUBJECTIVE:   Patient rested well overnight, states pain started to ramp up but pain meds are controlling it well, tolerating diet, has been OOB and ambulating, passing gas, no BM    ROS:   A 14 point review of systems was conducted, significant findings as noted above. All other systems negative.    OBJECTIVE:    PHYSICAL EXAM:    Vitals:    01/30/25 2247 01/31/25 0329 01/31/25 0342 01/31/25 0739   BP: 132/84  (!) 147/83 (!) 144/82   Pulse: 73  76 71   Resp: 18 22 16 15   Temp: 98.5 °F (36.9 °C)  97.9 °F (36.6 °C) 98 °F (36.7 °C)   TempSrc: Oral  Oral Oral   SpO2: 95%  94% 96%   Weight:       Height:           Vitals: Stable  General appearance: alert, no acute distress  Post-op vital signs:  Stable   Constitutional: oriented x 3, No distress.   Cardiovascular: well-perfused  Pulmonary: Effort normal and breath sounds normal. No respiratory distress.   Musculoskeletal: no edema, moves all extremities, beach chair position  : mendoza in place with good UO  Abdomen: 2 ANISH drains to bulb suction present, incisions c/d/I, abdominal binder with foam in place, umbilical site Tegaderm removed and Bacitracin applied  Neurological: She is alert and oriented to person, place, and time. No deficits noted  Skin: Skin is warm and dry.   Psychiatric: normal affect, appropriate behavior      ASSESSMENT & PLAN:   This patient [facility] for a scheduled panniculectomy, sizov-wg-xav abdominoplasty and umbilical hernia repair of a recurrent umbilical hernia  The current problem began 2 years ago, and symptoms have been worsening with time. POD #1    - Provide ANISH drain education  - Will d/c with 10 days Keflex  - Remove mendoza and void check 8 hours after  - Continue abdominal binder, support  - Education on beach chair and hunched

## 2025-02-01 NOTE — DISCHARGE SUMMARY
Physician Discharge Summary     Patient ID:  Bita Brady  [id number]  49 y.o.  1975    Admit date: 1/30/2025    Discharge date and time: 1/31/2025  3:50 PM     Admitting Physician: Amando Schaffer MD     Discharge Physician: Amando Schaffer MD    Admission Diagnoses: Panniculitis [M79.3]    Discharge Diagnoses: same    Admission Condition: good    Discharged Condition: stable    Indication for Admission: surgical intervention    Hospital Course: Ms. Bita Brady is a 48 yo female with PMHx including HTN, HLD, excess skin and infections under her pannus 2/2 losing 100 lbs following her sleeve gastrectomy with Dr. Magana 12/05/2022. She was admitted to [facility] on 01/30/2025 for a scheduled panniculectomy, smpfa-br-hwd abdominoplasty and umbilical hernia repair of a recurrent umbilical hernia.    The patient followed a normal post-operative recovery without incident. She is being discharged home in stable condition, with 2 ANISH drains, and will call Dr. Schaffer's office for a follow-up appointment.     Consults: N/A    Significant Diagnostic Studies: N/A    Treatments: surgery: 01/30/2025: Panniculectomy, Ppazn-gu-Ozl abdominoplasty, umbilical hernia repair of recurrent umbilical hernia     Discharge Exam:  Vitals: Stable  General appearance: alert, no acute distress  Post-op vital signs:  Stable   Constitutional: oriented x 3, No distress.   Cardiovascular: well-perfused  Pulmonary: Effort normal and breath sounds normal. No respiratory distress.   Musculoskeletal: no edema, moves all extremities well, no weakness, beach chair position while sitting, hunched while ambulating  : good UO  Abdomen: 2 ANISH drains to bulb suction present, incisions c/d/I and well approximated, abdominal binder with foam in place, umbilical site with Bacitracin  Neurological: She is alert and oriented to person, place, and time. No deficits noted  Skin: Skin is warm and dry.   Psychiatric: normal affect, appropriate

## 2025-02-03 ENCOUNTER — TELEPHONE (OUTPATIENT)
Dept: SURGERY | Age: 50
End: 2025-02-03

## 2025-02-03 ENCOUNTER — TELEPHONE (OUTPATIENT)
Dept: FAMILY MEDICINE CLINIC | Age: 50
End: 2025-02-03

## 2025-02-03 NOTE — TELEPHONE ENCOUNTER
Post Op Call    Patient is 4 days out from  Panniculectomy, Quhts-pt-Rle abdominoplasty  surgery     Patient states that he/she is felling: patient states she is doing fine.     Discharge medications include antibiotics and pain medications: yes, patient states she is just doing antibiotic.           Any signs of Fevers greater than 101.9?  Chills? Redness? Warmth? Increased Swelling of tissue? no    Does patient have drains in place? yes, patient has 2 drains.   Any questions about drains? no    Does patient have vac in place? no   Questions about vac?         Verify date of post op appt matches date of vac takedown? no                        Reinforce restrictions and use of compression/ dressings :yes, patient verbalized understanding.     Patient has post-op appointment scheduled for: 02/06/2025

## 2025-02-03 NOTE — TELEPHONE ENCOUNTER
Care Transitions Initial Follow Up Call    Outreach made within 2 business days of discharge: Yes    Patient: Bita Brady Patient : 1975   MRN: [id number]  Reason for Admission: panniculitis  Discharge Date: 25       Spoke with: patient had scheduled procedure, no TCM needed at this time. Patient f/u with surgeon     Discharge department/facility: [facility]    TCM Interactive Patient Contact:  Was patient able to fill all prescriptions: Yes  Was patient instructed to bring all medications to the follow-up visit: Yes  Is patient taking all medications as directed in the discharge summary? Yes  Does patient understand their discharge instructions: Yes  Does patient have questions or concerns that need addressed prior to 7-14 day follow up office visit: no    Additional needs identified to be addressed with provider  No needs identified             Scheduled appointment with PCP within 7-14 days    Follow Up  Future Appointments   Date Time Provider Department Center   2025  1:00 PM Prachi Torres APRN - CNP [facility]   2025  9:00 AM Charly Magana MD [facility]       Leydi Gardner RN

## 2025-02-06 ENCOUNTER — OFFICE VISIT (OUTPATIENT)
Dept: SURGERY | Age: 50
End: 2025-02-06

## 2025-02-06 VITALS
HEART RATE: 69 BPM | WEIGHT: 142.2 LBS | DIASTOLIC BLOOD PRESSURE: 77 MMHG | RESPIRATION RATE: 16 BRPM | BODY MASS INDEX: 24.28 KG/M2 | SYSTOLIC BLOOD PRESSURE: 117 MMHG | HEIGHT: 64 IN

## 2025-02-06 DIAGNOSIS — Z09 POSTOP CHECK: Primary | ICD-10-CM

## 2025-02-06 PROCEDURE — 99024 POSTOP FOLLOW-UP VISIT: CPT

## 2025-02-06 NOTE — PROGRESS NOTES
[facility]    PROCEDURE: 1.  Panniculectomy  2.  Repair of recurrent umbilical hernia (<3 cm)  3.  Hudyb-vc-Ezw abdominoplasty with rectus plication  DATE: 1/30/25    Bita Brady has been recovering well since her procedure. Pain has been well controlled with the prescribed pain management regimen.     EXAM    /77   Pulse 69   Resp 16   Ht 1.626 m (5' 4.02\")   Wt 64.5 kg (142 lb 3.2 oz)   LMP 09/23/2021 (Approximate) Comment: over a year  BMI 24.40 kg/m²       GEN: NAD   ABD: Incision site healing appropriately. No hematoma/seroma. Good contour. Umbilicus viable. Drain serosang.     PATHOLOGY: FINAL DIAGNOSIS:     Pannus, panniculectomy:   -Benign skin and underlying fibrovascular adipose tissue, compatible   clinical history of pannus.     IMP: 49 y.o.female s/p Panniculectomy, repair of recurrent umbilical hernia, qtcah-de-dgw abdominoplasty with rectus plication  PLAN: Overall doing well. She will follow up next week for potential drain removal. She is happy thus far. She will call with any issues in the interim.     Prachi Torres, APRN - CNP   [facility]  [phone]  02/06/25

## 2025-02-13 ENCOUNTER — OFFICE VISIT (OUTPATIENT)
Dept: SURGERY | Age: 50
End: 2025-02-13

## 2025-02-13 VITALS
DIASTOLIC BLOOD PRESSURE: 78 MMHG | BODY MASS INDEX: 26.15 KG/M2 | HEART RATE: 70 BPM | WEIGHT: 153.2 LBS | SYSTOLIC BLOOD PRESSURE: 126 MMHG | HEIGHT: 64 IN

## 2025-02-13 DIAGNOSIS — Z09 POSTOP CHECK: Primary | ICD-10-CM

## 2025-02-13 PROCEDURE — 99024 POSTOP FOLLOW-UP VISIT: CPT

## 2025-02-13 NOTE — PROGRESS NOTES
[facility]    PROCEDURE: 1.  Panniculectomy  2.  Repair of recurrent umbilical hernia (<3 cm)  3.  Gudgm-jz-Hdo abdominoplasty with rectus plication  DATE: 1/30/25    Bita Brady has been recovering well since her procedure. Pain has been well controlled without prescribed pain medications. The patient presents today for abdominal drain removal, however her right drain is still over 30 ml.     EXAM    /78   Pulse 70   Ht 1.626 m (5' 4.02\")   Wt 69.5 kg (153 lb 3.2 oz)   LMP 09/23/2021 (Approximate) Comment: over a year  BMI 26.28 kg/m²       GEN: NAD   ABD: Incision site healing appropriately. No hematoma/seroma. Good contour. Umbilicus viable. Drain serosang.     PATHOLOGY: FINAL DIAGNOSIS:     Pannus, panniculectomy:   -Benign skin and underlying fibrovascular adipose tissue, compatible   clinical history of pannus.     IMP: 49 y.o.female s/p Panniculectomy, repair of recurrent umbilical hernia, lestr-fe-faw abdominoplasty with rectus plication  PLAN: Overall doing well. Left drain removed. She will call once her right drain is under 30 ml for two consecutive days to schedule follow up for removal.  She is happy thus far. She will call with any issues in the interim.     Prachi Torres, UMBERTO - CNP   [facility] Plastic & Reconstructive Surgery  [phone]  02/13/25

## 2025-02-18 ENCOUNTER — OFFICE VISIT (OUTPATIENT)
Dept: SURGERY | Age: 50
End: 2025-02-18

## 2025-02-18 VITALS — BODY MASS INDEX: 25.06 KG/M2 | HEIGHT: 64 IN | WEIGHT: 146.8 LBS | HEART RATE: 69 BPM

## 2025-02-18 DIAGNOSIS — Z09 POSTOP CHECK: Primary | ICD-10-CM

## 2025-02-18 PROCEDURE — 99024 POSTOP FOLLOW-UP VISIT: CPT

## 2025-02-18 NOTE — PROGRESS NOTES
[facility]    PROCEDURE: 1.  Panniculectomy  2.  Repair of recurrent umbilical hernia (<3 cm)  3.  Bmvsi-he-Qbr abdominoplasty with rectus plication  DATE: 1/30/25    Bita Brady has been recovering well since her procedure. Pain has been well controlled without prescribed pain medications. The patient presents today for right abdominal drain removal, she reports she is under 30 ml for two consecutive days.     EXAM    Pulse 69   Ht 1.626 m (5' 4.02\")   Wt 66.6 kg (146 lb 12.8 oz)   LMP 09/23/2021 (Approximate) Comment: over a year  BMI 25.18 kg/m²       GEN: NAD   ABD: Incision site healing appropriately. No hematoma/seroma. Good contour. Umbilicus viable. Drain serosang.     PATHOLOGY: FINAL DIAGNOSIS:     Pannus, panniculectomy:   -Benign skin and underlying fibrovascular adipose tissue, compatible   clinical history of pannus.     IMP: 49 y.o.female s/p Panniculectomy, repair of recurrent umbilical hernia, hkpcl-nj-qcl abdominoplasty with rectus plication  PLAN: Overall doing well. Right drain removed. She will follow up with Dr. Schaffer in two weeks. She is happy thus far. She will call with any issues in the interim.     Prachi Torres APRN - CNP   [facility]astic & Reconstructive Surgery  [phone]  02/18/25

## 2025-03-03 ENCOUNTER — TELEPHONE (OUTPATIENT)
Dept: SURGERY | Age: 50
End: 2025-03-03

## 2025-03-03 NOTE — TELEPHONE ENCOUNTER
Patient called requesting a return to work letter. She states that she would need the letter by 3/6/25.    Return to work date: 3/13/25, with restrictions.    Letter can be emailed to: [email or web address]     Patient can be reached at: [phone]

## 2025-03-03 NOTE — TELEPHONE ENCOUNTER
Spoke to patient, return to work 3/13/25 without restrictions faxed to  [phone] per patient request   Hemigard Intro: Due to skin fragility and wound tension, it was decided to use HEMIGARD adhesive retention suture devices to permit a linear closure. The skin was cleaned and dried for a 6cm distance away from the wound. Excessive hair, if present, was removed to allow for adhesion.

## 2025-03-04 DIAGNOSIS — E78.2 MIXED HYPERLIPIDEMIA: ICD-10-CM

## 2025-03-04 RX ORDER — ATORVASTATIN CALCIUM 40 MG/1
40 TABLET, FILM COATED ORAL DAILY
Qty: 90 TABLET | Refills: 1 | Status: SHIPPED | OUTPATIENT
Start: 2025-03-04

## 2025-03-04 NOTE — TELEPHONE ENCOUNTER
Refill Request     CONFIRM preferred pharmacy with the patient.    If Mail Order Rx - Pend for 90 day refill.      Last Seen: Last Seen Department: 1/14/2025  Last Seen by PCP: 1/14/2025    Last Written: 10/07/24 90 with 1 refill     If no future appointment scheduled:  Review the last OV with PCP and review information for follow-up visit,  Route STAFF MESSAGE with patient name to the  Pool for scheduling with the following information:            -  Timing of next visit           -  Visit type ie Physical, OV, etc           -  Diagnoses/Reason ie. COPD, HTN - Do not use MEDICATION, Follow-up or CHECK UP - Give reason for visit      Next Appointment:   Future Appointments   Date Time Provider Department Center   3/12/2025  1:15 PM Amando Schaffer MD [facility]LAST[facility]   5/8/2025  9:00 AM Charly Magana MD [facility]       Message sent to  to schedule appt with patient?  NO      Requested Prescriptions     Pending Prescriptions Disp Refills    atorvastatin (LIPITOR) 40 MG tablet 90 tablet 1     Sig: Take 1 tablet by mouth daily

## 2025-03-11 NOTE — PROGRESS NOTES
[facility]    PROCEDURE: 1.  Panniculectomy  2.  Repair of recurrent umbilical hernia (<3 cm)  3.  Dicug-mv-Iaz abdominoplasty with rectus plication  DATE: 1/30/25    Bita Brady has been recovering well since her procedure. Pain has been well controlled without pain medications. She notes some volume and presents for evaluation.    EXAM    /82   Pulse 70   Ht 1.626 m (5' 4.02\")   Wt 66.6 kg (146 lb 12.8 oz)   LMP 09/23/2021 (Approximate) Comment: over a year  BMI 25.18 kg/m²      GEN: NAD   ABD: Incisions healing well  Good contour  Umbilicus viable  Small fluid wave noted    IMP: 49 y.o.female s/p panniculectomy with FdL abdominoplasty  PLAN: Aspiration of the abdomen was performed with sterile technique removing 85cc of fluid without difficulty.  Will follow-up in 2 weeks.    Akira Schaffer MD  [facility]astic [facility] Reconstructive [facility]  [phone]  03/12/25

## 2025-03-12 ENCOUNTER — OFFICE VISIT (OUTPATIENT)
Dept: SURGERY | Age: 50
End: 2025-03-12

## 2025-03-12 VITALS
DIASTOLIC BLOOD PRESSURE: 82 MMHG | SYSTOLIC BLOOD PRESSURE: 128 MMHG | BODY MASS INDEX: 25.06 KG/M2 | WEIGHT: 146.8 LBS | HEART RATE: 70 BPM | HEIGHT: 64 IN

## 2025-03-12 DIAGNOSIS — Z09 POSTOP CHECK: Primary | ICD-10-CM

## 2025-03-12 PROCEDURE — 99024 POSTOP FOLLOW-UP VISIT: CPT | Performed by: SURGERY

## 2025-03-27 RX ORDER — OMEPRAZOLE 20 MG/1
CAPSULE, DELAYED RELEASE ORAL
Qty: 90 CAPSULE | Refills: 1 | OUTPATIENT
Start: 2025-03-27

## 2025-03-31 ENCOUNTER — OFFICE VISIT (OUTPATIENT)
Dept: SURGERY | Age: 50
End: 2025-03-31

## 2025-03-31 VITALS
SYSTOLIC BLOOD PRESSURE: 139 MMHG | HEIGHT: 64 IN | HEART RATE: 73 BPM | WEIGHT: 146 LBS | DIASTOLIC BLOOD PRESSURE: 86 MMHG | BODY MASS INDEX: 24.92 KG/M2

## 2025-03-31 DIAGNOSIS — Z09 POSTOP CHECK: Primary | ICD-10-CM

## 2025-03-31 PROCEDURE — 99024 POSTOP FOLLOW-UP VISIT: CPT | Performed by: SURGERY

## 2025-03-31 NOTE — PROGRESS NOTES
[facility]    PROCEDURE: 1.  Panniculectomy  2.  Repair of recurrent umbilical hernia (<3 cm)  3.  Gzdti-iz-Uqz abdominoplasty with rectus plication  DATE: 1/30/25    Bita Brady has been recovering well since her procedure. Pain has been well controlled without pain medications. She notes some volume and presents for evaluation.    EXAM    /86   Pulse 73   Ht 1.626 m (5' 4.02\")   Wt 66.2 kg (146 lb)   LMP 09/23/2021 (Approximate) Comment: over a year  BMI 25.05 kg/m²      GEN: NAD   ABD: Incisions healing well  Good contour  Umbilicus viable      IMP: 49 y.o.female s/p panniculectomy with FdL abdominoplasty  PLAN: Doing well. Will ensure lower position for her binder and return in 1 month.    Akira Schaffer MD  [facility] Reconstruct[facility] Surgery  [phone]  03/31/25

## 2025-04-08 ENCOUNTER — RESULTS FOLLOW-UP (OUTPATIENT)
Dept: FAMILY MEDICINE CLINIC | Age: 50
End: 2025-04-08

## 2025-05-08 ENCOUNTER — OFFICE VISIT (OUTPATIENT)
Dept: BARIATRICS/WEIGHT MGMT | Age: 50
End: 2025-05-08
Payer: COMMERCIAL

## 2025-05-08 VITALS
DIASTOLIC BLOOD PRESSURE: 70 MMHG | BODY MASS INDEX: 24.24 KG/M2 | HEART RATE: 73 BPM | HEIGHT: 64 IN | WEIGHT: 142 LBS | SYSTOLIC BLOOD PRESSURE: 118 MMHG | RESPIRATION RATE: 18 BRPM | OXYGEN SATURATION: 98 %

## 2025-05-08 DIAGNOSIS — Z98.84 S/P LAPAROSCOPIC SLEEVE GASTRECTOMY: Primary | ICD-10-CM

## 2025-05-08 PROCEDURE — 99214 OFFICE O/P EST MOD 30 MIN: CPT | Performed by: SURGERY

## 2025-05-08 NOTE — PROGRESS NOTES
[facility]   [facility]  Charly Magana MD, FACS, [address], [address] [place] [address] .  Phone: [phone]  Fax: [phone]            Chief Complaint   Patient presents with    Bariatrics Post Op Follow Up     2yr6mo s/p sleeve 12/5/22           HPI:    Bita Brady is a very pleasant 49 y.o.  female , Body mass index is 24.37 kg/m².. And multiple medical problems who is presenting for bariatric follow up care.   Bita is s/p laparoscopic sleeve gastrectomy by me 12/2022. Initial Weight: 253 lbs, Weight Loss: 111 lbs.   Comes today to the clinic without any complaints. Patient denies any nausea, vomiting, fevers, chills, shortness of breath, chest pain, constipation or urinary symptoms. Denies any heartburn nor dysphagia.  Patient informed us today that they are taking the multivitamins as instructed.  Patient denies any tingling, weakness,  numbness nor any neurological symptoms.  Bita is feeling very well, and is very active. Patient is very pleased with the weight loss and resolution of co-morbid conditions.      Pain Assessment   Denies any abdominal pain     Past Medical History:   Diagnosis Date    Chronic GERD 07/28/2022    Chronic GERD 07/28/2022   Disease of both eyes characterized by increased eye pressure 04/23/2018  Essential hypertension 10/30/2011  Glaucoma     Have a Cupped Optic Nerve. .  Hiatal hernia    Hypertension    Localized osteoarthrosis, lower leg 02/17/2016  Mixed hyperlipidemia 08/18/2022    Chronic GERD 07/28/2022    Disease of both eyes characterized by increased eye pressure 04/23/2018    Essential hypertension 10/30/2011    Glaucoma     Have a Cupped Optic Nerve.  Ask me about.    Hiatal hernia     Hypertension     Localized osteoarthrosis, lower leg 02/17/2016    Mixed hyperlipidemia 08/18/2022    Morbid obesity with BMI of 40.0-44.9, adult (HCC) 04/18/2019    Obesity     Prediabetes 04/18/2019    no medications

## 2025-05-08 NOTE — PROGRESS NOTES
Dietary Assessment Note      Vitals:   Vitals:    25 0904   BP: 118/70   Pulse: 73   Resp: 18   SpO2: 98%   Weight: 64.4 kg (142 lb)   Height: 1.626 m (5' 4\")    Patient stable / unchanged.    Total Weight Loss: 111 lbs    Labs reviewed: labs are reviewed, up to date and normal    Protein intake: 60-80 grams/day     Fluid intake: 48-64 oz/day, water + decaf ice tea with sweet n low    Multivitamin/mineral intake: fusion with iron    Calcium intake: 2 chews    Other: none    Exercise: yes walking 30 minutes per day 5 days per week     Nutrition Assessment: 2 years 6 months post-op visit. Tracking calories 1200 per day.    Breakfast: ratio yogurt   Snack: none OR light baby bel   Lunch: chicken salad with celery  Snack: none OR baby bel cheese  Dinner: chicken burgers with feta + veggies/side salad   Snack: NSA Anita bars     Amount able to eat per sittin cup total    Following /30/30 rule: yes    Food Intolerances/issues: none    Client Concerns: none     Goals:   - increase fruits and vegetables   - continue current exercise     Plan: Follow up per provider and as needed    Daylin Fulton, RD, LD

## 2025-05-20 NOTE — PROGRESS NOTES
[facility]    PROCEDURE: 1.  Panniculectomy  2.  Repair of recurrent umbilical hernia (<3 cm)  3.  Xxvld-uu-Gmj abdominoplasty with rectus plication  DATE: 1/30/25    Bita Brady has been recovering well since her procedure. Pain has been well controlled without pain medications.     PMHx:   Past Medical History:   Diagnosis Date    Chronic GERD 07/28/2022    Chronic GERD 07/28/2022   Disease of both eyes characterized by increased eye pressure 04/23/2018  Essential hypertension 10/30/2011  Glaucoma     Have a Cupped Optic Nerve. .  Hiatal hernia    Hypertension    Localized osteoarthrosis, lower leg 02/17/2016  Mixed hyperlipidemia 08/18/2022    Chronic GERD 07/28/2022    Disease of both eyes characterized by increased eye pressure 04/23/2018    Essential hypertension 10/30/2011    Glaucoma     Have a Cupped Optic Nerve.  Ask me about.    Hiatal hernia     Hypertension     Localized osteoarthrosis, lower leg 02/17/2016    Mixed hyperlipidemia 08/18/2022    Morbid obesity with BMI of 40.0-44.9, adult ([facility]) 04/18/2019    Obesity     Prediabetes 04/18/2019    no medications for this dx.    Severe obesity (BMI 35.0-39.9) with comorbidity ([facility]) 12/05/2022    Thyroid disease     hypothyroidism    Wears contact lenses     Wears glasses      PSHx:   Past Surgical History:   Procedure Laterality Date    ABDOMEN SURGERY N/A 1/30/2025    Panniculectomy, Kqwmg-ro-Qje abdominoplasty performed by Amando Schaffer MD at [facility] OR    ABDOMINOPLASTY N/A 1/30/2025    . performed by Amando Schaffer MD at [facility] OR    BREAST BIOPSY      BREAST BIOPSY, HIATAL HERNIA [id number]/5/2022   LAPAROSCOPIC HIATAL HERNIA REPAIR, SLEEVE [id number]/5/2022   LAPAROSCOPIC SLEEVE GASTRECTOMY performed by Charly Magana MD at [facility] OR  UMBILICAL HERNIA REPAIR N/A 12/5/2022   LAPAROSCOPIC UMBILICAL HERNIA REPAIR performed by Charly Magana MD at [facility] OR  UPPER GASTROINTESTINAL ENDOSCOPY N/A 9/23/2022   EGD BIOPSY

## 2025-05-21 ENCOUNTER — OFFICE VISIT (OUTPATIENT)
Dept: SURGERY | Age: 50
End: 2025-05-21
Payer: COMMERCIAL

## 2025-05-21 VITALS
OXYGEN SATURATION: 98 % | DIASTOLIC BLOOD PRESSURE: 81 MMHG | HEART RATE: 70 BPM | TEMPERATURE: 97.5 F | WEIGHT: 145 LBS | BODY MASS INDEX: 24.89 KG/M2 | SYSTOLIC BLOOD PRESSURE: 130 MMHG

## 2025-05-21 DIAGNOSIS — Z09 POSTOP CHECK: Primary | ICD-10-CM

## 2025-05-21 PROCEDURE — 99213 OFFICE O/P EST LOW 20 MIN: CPT | Performed by: SURGERY

## 2025-06-04 RX ORDER — LEVOTHYROXINE SODIUM 100 MCG
100 TABLET ORAL DAILY
Qty: 90 TABLET | Refills: 1 | Status: SHIPPED | OUTPATIENT
Start: 2025-06-04

## 2025-06-25 ENCOUNTER — RESULTS FOLLOW-UP (OUTPATIENT)
Dept: FAMILY MEDICINE CLINIC | Age: 50
End: 2025-06-25

## 2025-08-04 DIAGNOSIS — E78.2 MIXED HYPERLIPIDEMIA: ICD-10-CM

## 2025-08-05 RX ORDER — ATORVASTATIN CALCIUM 40 MG/1
40 TABLET, FILM COATED ORAL DAILY
Qty: 90 TABLET | Refills: 0 | Status: SHIPPED | OUTPATIENT
Start: 2025-08-05

## 2025-08-05 ASSESSMENT — PATIENT HEALTH QUESTIONNAIRE - PHQ9
SUM OF ALL RESPONSES TO PHQ QUESTIONS 1-9: 0
SUM OF ALL RESPONSES TO PHQ QUESTIONS 1-9: 0
SUM OF ALL RESPONSES TO PHQ9 QUESTIONS 1 & 2: 0
2. FEELING DOWN, DEPRESSED OR HOPELESS: NOT AT ALL
SUM OF ALL RESPONSES TO PHQ QUESTIONS 1-9: 0
1. LITTLE INTEREST OR PLEASURE IN DOING THINGS: NOT AT ALL
SUM OF ALL RESPONSES TO PHQ QUESTIONS 1-9: 0
2. FEELING DOWN, DEPRESSED OR HOPELESS: NOT AT ALL
1. LITTLE INTEREST OR PLEASURE IN DOING THINGS: NOT AT ALL

## 2025-08-08 ENCOUNTER — OFFICE VISIT (OUTPATIENT)
Dept: FAMILY MEDICINE CLINIC | Age: 50
End: 2025-08-08
Payer: COMMERCIAL

## 2025-08-08 VITALS
OXYGEN SATURATION: 99 % | BODY MASS INDEX: 25.54 KG/M2 | HEART RATE: 70 BPM | DIASTOLIC BLOOD PRESSURE: 70 MMHG | TEMPERATURE: 97.2 F | SYSTOLIC BLOOD PRESSURE: 110 MMHG | WEIGHT: 149.6 LBS | HEIGHT: 64 IN

## 2025-08-08 DIAGNOSIS — Z00.00 ENCOUNTER FOR WELL ADULT EXAM WITHOUT ABNORMAL FINDINGS: Primary | ICD-10-CM

## 2025-08-08 DIAGNOSIS — Z98.84 S/P LAPAROSCOPIC SLEEVE GASTRECTOMY: ICD-10-CM

## 2025-08-08 LAB
25(OH)D3 SERPL-MCNC: 50.8 NG/ML
ALBUMIN SERPL-MCNC: 4.4 G/DL (ref 3.4–5)
ALBUMIN/GLOB SERPL: 1.8 {RATIO} (ref 1.1–2.2)
ALP SERPL-CCNC: 70 U/L (ref 40–129)
ALT SERPL-CCNC: 22 U/L (ref 10–40)
ANION GAP SERPL CALCULATED.3IONS-SCNC: 13 MMOL/L (ref 3–16)
AST SERPL-CCNC: 21 U/L (ref 15–37)
BASOPHILS # BLD: 0 K/UL (ref 0–0.2)
BASOPHILS NFR BLD: 0.3 %
BILIRUB SERPL-MCNC: 0.4 MG/DL (ref 0–1)
BUN SERPL-MCNC: 13 MG/DL (ref 7–20)
CALCIUM SERPL-MCNC: 10.3 MG/DL (ref 8.3–10.6)
CHLORIDE SERPL-SCNC: 102 MMOL/L (ref 99–110)
CHOLEST SERPL-MCNC: 170 MG/DL (ref 0–199)
CO2 SERPL-SCNC: 26 MMOL/L (ref 21–32)
CREAT SERPL-MCNC: 0.7 MG/DL (ref 0.6–1.1)
DEPRECATED RDW RBC AUTO: 13.7 % (ref 12.4–15.4)
EOSINOPHIL # BLD: 0.1 K/UL (ref 0–0.6)
EOSINOPHIL NFR BLD: 2.3 %
EST. AVERAGE GLUCOSE BLD GHB EST-MCNC: 114 MG/DL
FOLATE SERPL-MCNC: 31.1 NG/ML (ref 4.78–24.2)
GFR SERPLBLD CREATININE-BSD FMLA CKD-EPI: >90 ML/MIN/{1.73_M2}
GLUCOSE SERPL-MCNC: 92 MG/DL (ref 70–99)
HBA1C MFR BLD: 5.6 %
HCT VFR BLD AUTO: 41.7 % (ref 36–48)
HDLC SERPL-MCNC: 82 MG/DL (ref 40–60)
HGB BLD-MCNC: 13.8 G/DL (ref 12–16)
INR PPP: 0.97 (ref 0.86–1.14)
IRON SATN MFR SERPL: 18 % (ref 15–50)
IRON SERPL-MCNC: 59 UG/DL (ref 37–145)
LDLC SERPL CALC-MCNC: 74 MG/DL
LYMPHOCYTES # BLD: 1.4 K/UL (ref 1–5.1)
LYMPHOCYTES NFR BLD: 24.1 %
MCH RBC QN AUTO: 25.7 PG (ref 26–34)
MCHC RBC AUTO-ENTMCNC: 33.1 G/DL (ref 31–36)
MCV RBC AUTO: 77.6 FL (ref 80–100)
MONOCYTES # BLD: 0.4 K/UL (ref 0–1.3)
MONOCYTES NFR BLD: 7.3 %
NEUTROPHILS # BLD: 3.7 K/UL (ref 1.7–7.7)
NEUTROPHILS NFR BLD: 66 %
PLATELET # BLD AUTO: 214 K/UL (ref 135–450)
PMV BLD AUTO: 8.3 FL (ref 5–10.5)
POTASSIUM SERPL-SCNC: 3.7 MMOL/L (ref 3.5–5.1)
PROT SERPL-MCNC: 6.8 G/DL (ref 6.4–8.2)
PROTHROMBIN TIME: 13.2 SEC (ref 12.1–14.9)
RBC # BLD AUTO: 5.37 M/UL (ref 4–5.2)
SODIUM SERPL-SCNC: 141 MMOL/L (ref 136–145)
T4 FREE SERPL-MCNC: 1.5 NG/DL (ref 0.9–1.8)
TIBC SERPL-MCNC: 319 UG/DL (ref 260–445)
TRIGL SERPL-MCNC: 71 MG/DL (ref 0–150)
TSH SERPL DL<=0.005 MIU/L-ACNC: 0.16 UIU/ML (ref 0.27–4.2)
VIT B12 SERPL-MCNC: 1187 PG/ML (ref 211–911)
VLDLC SERPL CALC-MCNC: 14 MG/DL
WBC # BLD AUTO: 5.6 K/UL (ref 4–11)

## 2025-08-08 PROCEDURE — 99396 PREV VISIT EST AGE 40-64: CPT | Performed by: FAMILY MEDICINE

## 2025-08-12 LAB
A-TOCOPHEROL VIT E SERPL-MCNC: 10.9 MG/L (ref 5.5–18)
ANNOTATION COMMENT IMP: NORMAL
BETA+GAMMA TOCOPHEROL SERPL-MCNC: 0.8 MG/L (ref 0–6)
RETINYL PALMITATE SERPL-MCNC: 0.03 MG/L (ref 0–0.1)
VIT A SERPL-MCNC: 0.6 MG/L (ref 0.3–1.2)
VIT B1 BLD-MCNC: 152 NMOL/L (ref 70–180)

## 2025-08-25 ENCOUNTER — OFFICE VISIT (OUTPATIENT)
Dept: SURGERY | Age: 50
End: 2025-08-25
Payer: COMMERCIAL

## 2025-08-25 VITALS
OXYGEN SATURATION: 98 % | HEART RATE: 65 BPM | BODY MASS INDEX: 25.4 KG/M2 | WEIGHT: 148 LBS | TEMPERATURE: 98.7 F | DIASTOLIC BLOOD PRESSURE: 75 MMHG | SYSTOLIC BLOOD PRESSURE: 120 MMHG

## 2025-08-25 DIAGNOSIS — Z09 POSTOP CHECK: Primary | ICD-10-CM

## 2025-08-25 PROCEDURE — 99213 OFFICE O/P EST LOW 20 MIN: CPT | Performed by: SURGERY

## (undated) DEVICE — APPLICATOR PREP 26ML 0.7% IOD POVACRYLEX 74% ISO ALC ST

## (undated) DEVICE — Device

## (undated) DEVICE — SYRINGE MED 10ML TRNSLUC BRL PLUNG BLK MRK POLYPR CTRL

## (undated) DEVICE — BLANKET WRM W29.9XL79.1IN UP BODY FORC AIR MISTRAL-AIR

## (undated) DEVICE — KIT BARIATRIC SIGNIA TRISTAPLE 1

## (undated) DEVICE — SUTURE PLN GUT SZ 5-0 L18IN ABSRB YELLOWISH TAN L13MM PC-1 1915G

## (undated) DEVICE — TOWEL,STOP FLAG GOLD N-W: Brand: MEDLINE

## (undated) DEVICE — MOUTHPIECE ENDOSCP L CTRL OPN AND SIDE PORTS DISP

## (undated) DEVICE — BLADE CLIPPER SURG SENSICLIP

## (undated) DEVICE — BINDER ABD H12IN FOR 30-45IN WAIST UNIV 4 PNL PREM DSGN E

## (undated) DEVICE — TROCAR SLEEVE: Brand: KII ® LOW PROFILE SLEEVE

## (undated) DEVICE — SHEETS TRANSFER  LATERAL 34 IN X 46 IN

## (undated) DEVICE — APPLICATOR MEDICATED 26 CC SOLUTION HI LT ORNG CHLORAPREP

## (undated) DEVICE — PLASTIC MAJOR: Brand: MEDLINE INDUSTRIES, INC.

## (undated) DEVICE — PASSIVE LAPSCP FLTR W/ 1/4INX24 TBNG AND M LUER LCK FIT - U

## (undated) DEVICE — PAD FOAM 11.75X7-7/8 IN RESTON LF

## (undated) DEVICE — GOWN,AURORA,NONREINF,RAGLAN,XXL,STERILE: Brand: MEDLINE

## (undated) DEVICE — DEVICE SUT 0 L48IN GRN POLY BRAID LD UNIT DISP SURGDAC

## (undated) DEVICE — ENDOSCOPIC KIT 6X3/16 FT COLON W/ 1.1 OZ 2 GWN W/O BRSH

## (undated) DEVICE — LAPAROSCOPY PACK: Brand: MEDLINE INDUSTRIES, INC.

## (undated) DEVICE — STERILE POLYISOPRENE POWDER-FREE SURGICAL GLOVES: Brand: PROTEXIS

## (undated) DEVICE — TRUE CONTENT TO BE POPULATED AS PART OF REBRANDING: Brand: ARGYLE

## (undated) DEVICE — RELOAD STPL 2.5MM L60MM 0DEG VASC TISS TAN TI 6 ROW LIN

## (undated) DEVICE — SUTURE ETHBND EXCEL SZ 0 L30IN NONABSORBABLE GRN CT1 L36MM X424H

## (undated) DEVICE — DRAPE,LAP,CHOLE,W/TROUGHS,STERILE: Brand: MEDLINE

## (undated) DEVICE — GARMENT,MEDLINE,DVT,INT,CALF,MED, GEN2: Brand: MEDLINE

## (undated) DEVICE — SUTURE 1 36 VIO MONO PDS PDP371T

## (undated) DEVICE — SHEET,DRAPE,53X77,STERILE: Brand: MEDLINE

## (undated) DEVICE — LAPAROSCOPIC SCISSORS: Brand: EPIX LAPAROSCOPIC SCISSORS

## (undated) DEVICE — STANDARD HYPODERMIC NEEDLE,POLYPROPYLENE HUB: Brand: MONOJECT

## (undated) DEVICE — DEVICE SUT W/ SZ 0 L48IN VLT POLYSRB SUT DISP ES-9 ENDO

## (undated) DEVICE — LOTION PREP REMV 5OZ IODO CLR TINC OF BENZ DURAPREP

## (undated) DEVICE — NEEDLE SPNL L3.5IN PNK HUB S STL REG WALL FIT STYL W/ QNCKE

## (undated) DEVICE — AGENT HEMSTAT 3GM OXIDIZED REGENERATED CELOS ABSRB FOR CONT (ORDER MULTIPLES OF 5EA)

## (undated) DEVICE — TUBING, SUCTION, 1/4" X 12', STRAIGHT: Brand: MEDLINE

## (undated) DEVICE — APPLIER CLP L9.38IN M LIG TI DISP STR RNG HNDL LIGACLP

## (undated) DEVICE — TROCAR: Brand: KII OPTICAL ACCESS SYSTEM

## (undated) DEVICE — SOLUTION IRRIG 1000ML 0.9% SOD CHL USP POUR PLAS BTL

## (undated) DEVICE — SUTURE MONOCRYL SZ 3-0 L27IN ABSRB UD PS-2 3/8 CIR REV CUT NDL MCP427H

## (undated) DEVICE — VALVE SUCTION AIR H2O SET ORCA POD + DISP

## (undated) DEVICE — BOWL MED L 32OZ PLAS W/ MOLD GRAD EZ OPN PEEL PCH

## (undated) DEVICE — APPLICATOR LAP 35 CM 2 RIGID VISTASEAL

## (undated) DEVICE — SHEARS ENDOSCP HARM 36CM ULTRASONIC CRV TIP UPGRD

## (undated) DEVICE — AIR/WATER CLEANING ADAPTER FOR OLYMPUS® GI ENDOSCOPE: Brand: BULLDOG®

## (undated) DEVICE — SUTURE VCRL + SZ 4-0 L18IN ABSRB UD L19MM PS-2 3/8 CIR PRIM VCP496H

## (undated) DEVICE — DRESSING,GAUZE,XEROFORM,CURAD,1"X8",ST: Brand: CURAD

## (undated) DEVICE — NEEDLE HYPO 22GA L1.5IN BLK POLYPR HUB S STL REG BVL STR

## (undated) DEVICE — SUTURE ETHILON SZ 3-0 L18IN NONABSORBABLE BLK L24MM FS-1 3/8 663G

## (undated) DEVICE — GLOVE ORANGE PI 7 1/2   MSG9075

## (undated) DEVICE — 3M™ TEGADERM™ TRANSPARENT FILM DRESSING FRAME STYLE, 1624W, 2-3/8 IN X 2-3/4 IN (6 CM X 7 CM), 100/CT 4CT/CASE: Brand: 3M™ TEGADERM™

## (undated) DEVICE — 4-PORT MANIFOLD: Brand: NEPTUNE 2

## (undated) DEVICE — BLADE,CARBON-STEEL,15,STRL,DISPOSABLE,TB: Brand: MEDLINE

## (undated) DEVICE — SYSTEM SKIN CLOSURE 42CM DERMABOND PRINEO

## (undated) DEVICE — TRAP FLUID

## (undated) DEVICE — AIR SHEET,LAT,COMFORT GLIDE, BLEND 40X80: Brand: MEDLINE

## (undated) DEVICE — SHEET,DRAPE,40X58,STERILE: Brand: MEDLINE

## (undated) DEVICE — TROCARS: Brand: KII® OPTICAL ACCESS SYSTEM

## (undated) DEVICE — TOWEL,OR,DSP,ST,BLUE,DLX,8/PK,10PK/CS: Brand: MEDLINE

## (undated) DEVICE — BLADE ES ELASTOMERIC COAT INSUL DURABLE BEND UPTO 90DEG

## (undated) DEVICE — CRADLE ANK AND FT ELEV FLAT END POLY FOAM W/ VENT H NEUT

## (undated) DEVICE — BLADE,CARBON-STEEL,10,STRL,DISPOSABLE,TB: Brand: MEDLINE

## (undated) DEVICE — SPONGE,LAP,4"X18",XR,ST,5/PK,40PK/CS: Brand: MEDLINE INDUSTRIES, INC.

## (undated) DEVICE — NEEDLE INSUF L150MM DIA2MM DISP FOR PNEUMOPERI ENDOPATH

## (undated) DEVICE — BINDER ABD UNIV H9IN WAIST 30-45IN E SFT COT PREM 3 PNL

## (undated) DEVICE — SUTURE VICRYL + SZ 2-0 L18IN ABSRB VLT L26MM SH 1/2 CIR VCP775D

## (undated) DEVICE — 1010 S-DRAPE TOWEL DRAPE 10/BX: Brand: STERI-DRAPE™

## (undated) DEVICE — MERCY FAIRFIELD TURNOVER KIT: Brand: MEDLINE INDUSTRIES, INC.

## (undated) DEVICE — INTENDED USE FOR SURGICAL MARKING ON INTACT SKIN, ALSO PROVIDES A PERMANENT METHOD OF IDENTIFYING OBJECTS IN THE OPERATING ROOM: Brand: WRITESITE® PLUS MINI PREP RESISTANT MARKER

## (undated) DEVICE — TROCAR: Brand: KII FIOS FIRST ENTRY

## (undated) DEVICE — SYSTEM SKIN CLSR 22CM DERMBND PRINEO

## (undated) DEVICE — PLASMABLADE PS210-030S 3.0S LOCK: Brand: PLASMABLADE™

## (undated) DEVICE — SUTURE ABSORBABLE MONOFILAMENT 3-0 SH 27 IN VIO PDS + PDP316H

## (undated) DEVICE — ARM CRADLE: Brand: DEVON

## (undated) DEVICE — FORCEPS BX L240CM WRK CHN 2.8MM STD CAP W/ NDL MIC MESH

## (undated) DEVICE — GLOVE ORANGE PI 8 1/2   MSG9085

## (undated) DEVICE — SUTURE VCRL PLUS SZ 0 54IN TIE VCP608H

## (undated) DEVICE — SOLUTION IV IRRIG WATER 500ML POUR BRL ST 2F7113

## (undated) DEVICE — BW-412T DISP COMBO CLEANING BRUSH: Brand: SINGLE USE COMBINATION CLEANING BRUSH

## (undated) DEVICE — SUTURE VICRYL + SZ 2-0 L27IN ABSRB VLT SH L26MM 1/2 CIR TAPR

## (undated) DEVICE — ADHESIVE SKIN CLSR 0.7ML TOP DERMBND ADV

## (undated) DEVICE — APPLIER LIG CLP M L11IN TI STR RNG HNDL FOR 20 CLP DISP

## (undated) DEVICE — 1LYRTR 16FR10ML100%SIL UMS SNP: Brand: MEDLINE INDUSTRIES, INC.

## (undated) DEVICE — SUTURE MONOCRYL STRATAFIX SPRL + SZ 3-0 L18IN ABSRB UD PS-2 SXMP1B107

## (undated) DEVICE — SUTURE MONOCRYL SZ 3-0 L27IN ABSRB UD L19MM PS-2 3/8 CIR PRIM Y427H